# Patient Record
Sex: MALE | Race: WHITE | NOT HISPANIC OR LATINO
[De-identification: names, ages, dates, MRNs, and addresses within clinical notes are randomized per-mention and may not be internally consistent; named-entity substitution may affect disease eponyms.]

---

## 2017-05-11 ENCOUNTER — TRANSCRIPTION ENCOUNTER (OUTPATIENT)
Age: 77
End: 2017-05-11

## 2017-05-11 PROBLEM — Z00.00 ENCOUNTER FOR PREVENTIVE HEALTH EXAMINATION: Status: ACTIVE | Noted: 2017-05-11

## 2017-06-01 ENCOUNTER — APPOINTMENT (OUTPATIENT)
Dept: SURGERY | Facility: CLINIC | Age: 77
End: 2017-06-01

## 2017-06-01 VITALS
WEIGHT: 172 LBS | DIASTOLIC BLOOD PRESSURE: 72 MMHG | BODY MASS INDEX: 28.66 KG/M2 | SYSTOLIC BLOOD PRESSURE: 118 MMHG | HEIGHT: 65 IN

## 2017-06-01 DIAGNOSIS — I25.10 ATHEROSCLEROTIC HEART DISEASE OF NATIVE CORONARY ARTERY W/OUT ANGINA PECTORIS: ICD-10-CM

## 2017-06-01 DIAGNOSIS — Z86.79 PERSONAL HISTORY OF OTHER DISEASES OF THE CIRCULATORY SYSTEM: ICD-10-CM

## 2017-06-01 DIAGNOSIS — K81.1 CHRONIC CHOLECYSTITIS: ICD-10-CM

## 2017-06-09 ENCOUNTER — OUTPATIENT (OUTPATIENT)
Dept: OUTPATIENT SERVICES | Facility: HOSPITAL | Age: 77
LOS: 1 days | Discharge: HOME | End: 2017-06-09

## 2017-06-09 DIAGNOSIS — I10 ESSENTIAL (PRIMARY) HYPERTENSION: ICD-10-CM

## 2017-06-09 DIAGNOSIS — I47.2 VENTRICULAR TACHYCARDIA: ICD-10-CM

## 2017-06-09 DIAGNOSIS — I47.1 SUPRAVENTRICULAR TACHYCARDIA: ICD-10-CM

## 2017-06-09 DIAGNOSIS — I25.10 ATHEROSCLEROTIC HEART DISEASE OF NATIVE CORONARY ARTERY WITHOUT ANGINA PECTORIS: ICD-10-CM

## 2017-06-09 DIAGNOSIS — E78.00 PURE HYPERCHOLESTEROLEMIA, UNSPECIFIED: ICD-10-CM

## 2017-06-09 DIAGNOSIS — J44.9 CHRONIC OBSTRUCTIVE PULMONARY DISEASE, UNSPECIFIED: ICD-10-CM

## 2017-06-16 ENCOUNTER — OUTPATIENT (OUTPATIENT)
Dept: OUTPATIENT SERVICES | Facility: HOSPITAL | Age: 77
LOS: 1 days | Discharge: HOME | End: 2017-06-16

## 2017-06-16 DIAGNOSIS — I10 ESSENTIAL (PRIMARY) HYPERTENSION: ICD-10-CM

## 2017-06-16 DIAGNOSIS — I47.1 SUPRAVENTRICULAR TACHYCARDIA: ICD-10-CM

## 2017-06-16 DIAGNOSIS — J44.9 CHRONIC OBSTRUCTIVE PULMONARY DISEASE, UNSPECIFIED: ICD-10-CM

## 2017-06-16 DIAGNOSIS — I47.2 VENTRICULAR TACHYCARDIA: ICD-10-CM

## 2017-06-16 DIAGNOSIS — I25.10 ATHEROSCLEROTIC HEART DISEASE OF NATIVE CORONARY ARTERY WITHOUT ANGINA PECTORIS: ICD-10-CM

## 2017-06-16 DIAGNOSIS — E78.00 PURE HYPERCHOLESTEROLEMIA, UNSPECIFIED: ICD-10-CM

## 2017-06-21 ENCOUNTER — OUTPATIENT (OUTPATIENT)
Dept: OUTPATIENT SERVICES | Facility: HOSPITAL | Age: 77
LOS: 1 days | Discharge: HOME | End: 2017-06-21

## 2017-06-21 DIAGNOSIS — E78.00 PURE HYPERCHOLESTEROLEMIA, UNSPECIFIED: ICD-10-CM

## 2017-06-21 DIAGNOSIS — I25.10 ATHEROSCLEROTIC HEART DISEASE OF NATIVE CORONARY ARTERY WITHOUT ANGINA PECTORIS: ICD-10-CM

## 2017-06-21 DIAGNOSIS — I10 ESSENTIAL (PRIMARY) HYPERTENSION: ICD-10-CM

## 2017-06-21 DIAGNOSIS — I47.1 SUPRAVENTRICULAR TACHYCARDIA: ICD-10-CM

## 2017-06-21 DIAGNOSIS — I47.2 VENTRICULAR TACHYCARDIA: ICD-10-CM

## 2017-06-21 DIAGNOSIS — J44.9 CHRONIC OBSTRUCTIVE PULMONARY DISEASE, UNSPECIFIED: ICD-10-CM

## 2017-06-23 ENCOUNTER — INPATIENT (INPATIENT)
Facility: HOSPITAL | Age: 77
LOS: 2 days | Discharge: HOME | End: 2017-06-26
Attending: SURGERY

## 2017-06-23 DIAGNOSIS — I47.2 VENTRICULAR TACHYCARDIA: ICD-10-CM

## 2017-06-23 DIAGNOSIS — I10 ESSENTIAL (PRIMARY) HYPERTENSION: ICD-10-CM

## 2017-06-23 DIAGNOSIS — I25.10 ATHEROSCLEROTIC HEART DISEASE OF NATIVE CORONARY ARTERY WITHOUT ANGINA PECTORIS: ICD-10-CM

## 2017-06-23 DIAGNOSIS — I47.1 SUPRAVENTRICULAR TACHYCARDIA: ICD-10-CM

## 2017-06-23 DIAGNOSIS — J44.9 CHRONIC OBSTRUCTIVE PULMONARY DISEASE, UNSPECIFIED: ICD-10-CM

## 2017-06-23 DIAGNOSIS — E78.00 PURE HYPERCHOLESTEROLEMIA, UNSPECIFIED: ICD-10-CM

## 2017-06-27 ENCOUNTER — OUTPATIENT (OUTPATIENT)
Dept: OUTPATIENT SERVICES | Facility: HOSPITAL | Age: 77
LOS: 1 days | Discharge: HOME | End: 2017-06-27

## 2017-06-27 DIAGNOSIS — I47.2 VENTRICULAR TACHYCARDIA: ICD-10-CM

## 2017-06-27 DIAGNOSIS — J44.9 CHRONIC OBSTRUCTIVE PULMONARY DISEASE, UNSPECIFIED: ICD-10-CM

## 2017-06-27 DIAGNOSIS — I10 ESSENTIAL (PRIMARY) HYPERTENSION: ICD-10-CM

## 2017-06-27 DIAGNOSIS — I25.10 ATHEROSCLEROTIC HEART DISEASE OF NATIVE CORONARY ARTERY WITHOUT ANGINA PECTORIS: ICD-10-CM

## 2017-06-27 DIAGNOSIS — E78.00 PURE HYPERCHOLESTEROLEMIA, UNSPECIFIED: ICD-10-CM

## 2017-06-27 DIAGNOSIS — I47.1 SUPRAVENTRICULAR TACHYCARDIA: ICD-10-CM

## 2017-06-28 DIAGNOSIS — Z95.2 PRESENCE OF PROSTHETIC HEART VALVE: ICD-10-CM

## 2017-06-28 DIAGNOSIS — Z79.01 LONG TERM (CURRENT) USE OF ANTICOAGULANTS: ICD-10-CM

## 2017-06-30 DIAGNOSIS — J96.00 ACUTE RESPIRATORY FAILURE, UNSPECIFIED WHETHER WITH HYPOXIA OR HYPERCAPNIA: ICD-10-CM

## 2017-06-30 DIAGNOSIS — I10 ESSENTIAL (PRIMARY) HYPERTENSION: ICD-10-CM

## 2017-06-30 DIAGNOSIS — I48.92 UNSPECIFIED ATRIAL FLUTTER: ICD-10-CM

## 2017-06-30 DIAGNOSIS — Z79.01 LONG TERM (CURRENT) USE OF ANTICOAGULANTS: ICD-10-CM

## 2017-06-30 DIAGNOSIS — I25.2 OLD MYOCARDIAL INFARCTION: ICD-10-CM

## 2017-06-30 DIAGNOSIS — K80.00 CALCULUS OF GALLBLADDER WITH ACUTE CHOLECYSTITIS WITHOUT OBSTRUCTION: ICD-10-CM

## 2017-06-30 DIAGNOSIS — F17.200 NICOTINE DEPENDENCE, UNSPECIFIED, UNCOMPLICATED: ICD-10-CM

## 2017-06-30 DIAGNOSIS — Z95.0 PRESENCE OF CARDIAC PACEMAKER: ICD-10-CM

## 2017-06-30 DIAGNOSIS — E78.5 HYPERLIPIDEMIA, UNSPECIFIED: ICD-10-CM

## 2017-06-30 DIAGNOSIS — I25.10 ATHEROSCLEROTIC HEART DISEASE OF NATIVE CORONARY ARTERY WITHOUT ANGINA PECTORIS: ICD-10-CM

## 2017-06-30 DIAGNOSIS — K81.1 CHRONIC CHOLECYSTITIS: ICD-10-CM

## 2017-06-30 DIAGNOSIS — I44.0 ATRIOVENTRICULAR BLOCK, FIRST DEGREE: ICD-10-CM

## 2017-06-30 DIAGNOSIS — I48.91 UNSPECIFIED ATRIAL FIBRILLATION: ICD-10-CM

## 2017-06-30 DIAGNOSIS — Z95.810 PRESENCE OF AUTOMATIC (IMPLANTABLE) CARDIAC DEFIBRILLATOR: ICD-10-CM

## 2017-06-30 DIAGNOSIS — Z95.2 PRESENCE OF PROSTHETIC HEART VALVE: ICD-10-CM

## 2017-06-30 DIAGNOSIS — J44.9 CHRONIC OBSTRUCTIVE PULMONARY DISEASE, UNSPECIFIED: ICD-10-CM

## 2017-06-30 DIAGNOSIS — Z95.1 PRESENCE OF AORTOCORONARY BYPASS GRAFT: ICD-10-CM

## 2017-07-01 ENCOUNTER — OUTPATIENT (OUTPATIENT)
Dept: OUTPATIENT SERVICES | Facility: HOSPITAL | Age: 77
LOS: 1 days | Discharge: HOME | End: 2017-07-01

## 2017-07-01 DIAGNOSIS — I25.10 ATHEROSCLEROTIC HEART DISEASE OF NATIVE CORONARY ARTERY WITHOUT ANGINA PECTORIS: ICD-10-CM

## 2017-07-01 DIAGNOSIS — E78.00 PURE HYPERCHOLESTEROLEMIA, UNSPECIFIED: ICD-10-CM

## 2017-07-01 DIAGNOSIS — I47.2 VENTRICULAR TACHYCARDIA: ICD-10-CM

## 2017-07-01 DIAGNOSIS — I47.1 SUPRAVENTRICULAR TACHYCARDIA: ICD-10-CM

## 2017-07-01 DIAGNOSIS — Z79.01 LONG TERM (CURRENT) USE OF ANTICOAGULANTS: ICD-10-CM

## 2017-07-01 DIAGNOSIS — I10 ESSENTIAL (PRIMARY) HYPERTENSION: ICD-10-CM

## 2017-07-01 DIAGNOSIS — Z95.2 PRESENCE OF PROSTHETIC HEART VALVE: ICD-10-CM

## 2017-07-01 DIAGNOSIS — J44.9 CHRONIC OBSTRUCTIVE PULMONARY DISEASE, UNSPECIFIED: ICD-10-CM

## 2017-07-05 DIAGNOSIS — J95.821 ACUTE POSTPROCEDURAL RESPIRATORY FAILURE: ICD-10-CM

## 2017-07-06 ENCOUNTER — OUTPATIENT (OUTPATIENT)
Dept: OUTPATIENT SERVICES | Facility: HOSPITAL | Age: 77
LOS: 1 days | Discharge: HOME | End: 2017-07-06

## 2017-07-06 DIAGNOSIS — I10 ESSENTIAL (PRIMARY) HYPERTENSION: ICD-10-CM

## 2017-07-06 DIAGNOSIS — I47.2 VENTRICULAR TACHYCARDIA: ICD-10-CM

## 2017-07-06 DIAGNOSIS — I47.1 SUPRAVENTRICULAR TACHYCARDIA: ICD-10-CM

## 2017-07-06 DIAGNOSIS — Z79.01 LONG TERM (CURRENT) USE OF ANTICOAGULANTS: ICD-10-CM

## 2017-07-06 DIAGNOSIS — J44.9 CHRONIC OBSTRUCTIVE PULMONARY DISEASE, UNSPECIFIED: ICD-10-CM

## 2017-07-06 DIAGNOSIS — Z95.2 PRESENCE OF PROSTHETIC HEART VALVE: ICD-10-CM

## 2017-07-06 DIAGNOSIS — E78.00 PURE HYPERCHOLESTEROLEMIA, UNSPECIFIED: ICD-10-CM

## 2017-07-06 DIAGNOSIS — I25.10 ATHEROSCLEROTIC HEART DISEASE OF NATIVE CORONARY ARTERY WITHOUT ANGINA PECTORIS: ICD-10-CM

## 2017-07-12 ENCOUNTER — APPOINTMENT (OUTPATIENT)
Dept: SURGERY | Facility: CLINIC | Age: 77
End: 2017-07-12

## 2017-07-13 ENCOUNTER — OUTPATIENT (OUTPATIENT)
Dept: OUTPATIENT SERVICES | Facility: HOSPITAL | Age: 77
LOS: 1 days | Discharge: HOME | End: 2017-07-13

## 2017-07-13 DIAGNOSIS — E78.00 PURE HYPERCHOLESTEROLEMIA, UNSPECIFIED: ICD-10-CM

## 2017-07-13 DIAGNOSIS — J44.9 CHRONIC OBSTRUCTIVE PULMONARY DISEASE, UNSPECIFIED: ICD-10-CM

## 2017-07-13 DIAGNOSIS — Z79.01 LONG TERM (CURRENT) USE OF ANTICOAGULANTS: ICD-10-CM

## 2017-07-13 DIAGNOSIS — I10 ESSENTIAL (PRIMARY) HYPERTENSION: ICD-10-CM

## 2017-07-13 DIAGNOSIS — I47.1 SUPRAVENTRICULAR TACHYCARDIA: ICD-10-CM

## 2017-07-13 DIAGNOSIS — I47.2 VENTRICULAR TACHYCARDIA: ICD-10-CM

## 2017-07-13 DIAGNOSIS — I25.10 ATHEROSCLEROTIC HEART DISEASE OF NATIVE CORONARY ARTERY WITHOUT ANGINA PECTORIS: ICD-10-CM

## 2017-07-13 DIAGNOSIS — Z95.2 PRESENCE OF PROSTHETIC HEART VALVE: ICD-10-CM

## 2017-07-27 ENCOUNTER — OUTPATIENT (OUTPATIENT)
Dept: OUTPATIENT SERVICES | Facility: HOSPITAL | Age: 77
LOS: 1 days | Discharge: HOME | End: 2017-07-27

## 2017-07-27 DIAGNOSIS — Z79.01 LONG TERM (CURRENT) USE OF ANTICOAGULANTS: ICD-10-CM

## 2017-07-27 DIAGNOSIS — I10 ESSENTIAL (PRIMARY) HYPERTENSION: ICD-10-CM

## 2017-07-27 DIAGNOSIS — I47.1 SUPRAVENTRICULAR TACHYCARDIA: ICD-10-CM

## 2017-07-27 DIAGNOSIS — I47.2 VENTRICULAR TACHYCARDIA: ICD-10-CM

## 2017-07-27 DIAGNOSIS — E78.00 PURE HYPERCHOLESTEROLEMIA, UNSPECIFIED: ICD-10-CM

## 2017-07-27 DIAGNOSIS — J44.9 CHRONIC OBSTRUCTIVE PULMONARY DISEASE, UNSPECIFIED: ICD-10-CM

## 2017-07-27 DIAGNOSIS — Z95.2 PRESENCE OF PROSTHETIC HEART VALVE: ICD-10-CM

## 2017-07-27 DIAGNOSIS — I25.10 ATHEROSCLEROTIC HEART DISEASE OF NATIVE CORONARY ARTERY WITHOUT ANGINA PECTORIS: ICD-10-CM

## 2017-08-17 ENCOUNTER — OUTPATIENT (OUTPATIENT)
Dept: OUTPATIENT SERVICES | Facility: HOSPITAL | Age: 77
LOS: 1 days | Discharge: HOME | End: 2017-08-17

## 2017-08-17 DIAGNOSIS — I47.2 VENTRICULAR TACHYCARDIA: ICD-10-CM

## 2017-08-17 DIAGNOSIS — Z79.01 LONG TERM (CURRENT) USE OF ANTICOAGULANTS: ICD-10-CM

## 2017-08-17 DIAGNOSIS — I10 ESSENTIAL (PRIMARY) HYPERTENSION: ICD-10-CM

## 2017-08-17 DIAGNOSIS — Z95.2 PRESENCE OF PROSTHETIC HEART VALVE: ICD-10-CM

## 2017-08-17 DIAGNOSIS — I47.1 SUPRAVENTRICULAR TACHYCARDIA: ICD-10-CM

## 2017-08-17 DIAGNOSIS — E78.00 PURE HYPERCHOLESTEROLEMIA, UNSPECIFIED: ICD-10-CM

## 2017-08-17 DIAGNOSIS — I25.10 ATHEROSCLEROTIC HEART DISEASE OF NATIVE CORONARY ARTERY WITHOUT ANGINA PECTORIS: ICD-10-CM

## 2017-08-17 DIAGNOSIS — J44.9 CHRONIC OBSTRUCTIVE PULMONARY DISEASE, UNSPECIFIED: ICD-10-CM

## 2017-09-14 ENCOUNTER — OUTPATIENT (OUTPATIENT)
Dept: OUTPATIENT SERVICES | Facility: HOSPITAL | Age: 77
LOS: 1 days | Discharge: HOME | End: 2017-09-14

## 2017-09-14 DIAGNOSIS — I47.1 SUPRAVENTRICULAR TACHYCARDIA: ICD-10-CM

## 2017-09-14 DIAGNOSIS — Z79.01 LONG TERM (CURRENT) USE OF ANTICOAGULANTS: ICD-10-CM

## 2017-09-14 DIAGNOSIS — J44.9 CHRONIC OBSTRUCTIVE PULMONARY DISEASE, UNSPECIFIED: ICD-10-CM

## 2017-09-14 DIAGNOSIS — E78.00 PURE HYPERCHOLESTEROLEMIA, UNSPECIFIED: ICD-10-CM

## 2017-09-14 DIAGNOSIS — I25.10 ATHEROSCLEROTIC HEART DISEASE OF NATIVE CORONARY ARTERY WITHOUT ANGINA PECTORIS: ICD-10-CM

## 2017-09-14 DIAGNOSIS — I10 ESSENTIAL (PRIMARY) HYPERTENSION: ICD-10-CM

## 2017-09-14 DIAGNOSIS — I47.2 VENTRICULAR TACHYCARDIA: ICD-10-CM

## 2017-09-14 DIAGNOSIS — Z95.2 PRESENCE OF PROSTHETIC HEART VALVE: ICD-10-CM

## 2017-10-12 ENCOUNTER — OUTPATIENT (OUTPATIENT)
Dept: OUTPATIENT SERVICES | Facility: HOSPITAL | Age: 77
LOS: 1 days | Discharge: HOME | End: 2017-10-12

## 2017-10-12 DIAGNOSIS — I10 ESSENTIAL (PRIMARY) HYPERTENSION: ICD-10-CM

## 2017-10-12 DIAGNOSIS — E78.00 PURE HYPERCHOLESTEROLEMIA, UNSPECIFIED: ICD-10-CM

## 2017-10-12 DIAGNOSIS — J44.9 CHRONIC OBSTRUCTIVE PULMONARY DISEASE, UNSPECIFIED: ICD-10-CM

## 2017-10-12 DIAGNOSIS — Z95.2 PRESENCE OF PROSTHETIC HEART VALVE: ICD-10-CM

## 2017-10-12 DIAGNOSIS — I47.1 SUPRAVENTRICULAR TACHYCARDIA: ICD-10-CM

## 2017-10-12 DIAGNOSIS — I47.2 VENTRICULAR TACHYCARDIA: ICD-10-CM

## 2017-10-12 DIAGNOSIS — Z79.01 LONG TERM (CURRENT) USE OF ANTICOAGULANTS: ICD-10-CM

## 2017-10-12 DIAGNOSIS — I25.10 ATHEROSCLEROTIC HEART DISEASE OF NATIVE CORONARY ARTERY WITHOUT ANGINA PECTORIS: ICD-10-CM

## 2017-11-07 ENCOUNTER — OUTPATIENT (OUTPATIENT)
Dept: OUTPATIENT SERVICES | Facility: HOSPITAL | Age: 77
LOS: 1 days | Discharge: HOME | End: 2017-11-07

## 2017-11-07 DIAGNOSIS — Z79.899 OTHER LONG TERM (CURRENT) DRUG THERAPY: ICD-10-CM

## 2017-11-07 DIAGNOSIS — I25.10 ATHEROSCLEROTIC HEART DISEASE OF NATIVE CORONARY ARTERY WITHOUT ANGINA PECTORIS: ICD-10-CM

## 2017-11-07 DIAGNOSIS — E78.00 PURE HYPERCHOLESTEROLEMIA, UNSPECIFIED: ICD-10-CM

## 2017-11-07 DIAGNOSIS — Z01.810 ENCOUNTER FOR PREPROCEDURAL CARDIOVASCULAR EXAMINATION: ICD-10-CM

## 2017-11-07 DIAGNOSIS — J44.9 CHRONIC OBSTRUCTIVE PULMONARY DISEASE, UNSPECIFIED: ICD-10-CM

## 2017-11-07 DIAGNOSIS — I47.1 SUPRAVENTRICULAR TACHYCARDIA: ICD-10-CM

## 2017-11-07 DIAGNOSIS — I47.2 VENTRICULAR TACHYCARDIA: ICD-10-CM

## 2017-11-07 DIAGNOSIS — I10 ESSENTIAL (PRIMARY) HYPERTENSION: ICD-10-CM

## 2017-11-08 DIAGNOSIS — K81.1 CHRONIC CHOLECYSTITIS: ICD-10-CM

## 2017-11-08 DIAGNOSIS — J30.9 ALLERGIC RHINITIS, UNSPECIFIED: ICD-10-CM

## 2017-11-08 DIAGNOSIS — I10 ESSENTIAL (PRIMARY) HYPERTENSION: ICD-10-CM

## 2017-11-08 DIAGNOSIS — J44.9 CHRONIC OBSTRUCTIVE PULMONARY DISEASE, UNSPECIFIED: ICD-10-CM

## 2017-11-08 DIAGNOSIS — I50.9 HEART FAILURE, UNSPECIFIED: ICD-10-CM

## 2017-11-08 DIAGNOSIS — I25.10 ATHEROSCLEROTIC HEART DISEASE OF NATIVE CORONARY ARTERY WITHOUT ANGINA PECTORIS: ICD-10-CM

## 2017-11-08 DIAGNOSIS — Z01.818 ENCOUNTER FOR OTHER PREPROCEDURAL EXAMINATION: ICD-10-CM

## 2017-11-08 DIAGNOSIS — Z87.891 PERSONAL HISTORY OF NICOTINE DEPENDENCE: ICD-10-CM

## 2017-11-08 DIAGNOSIS — K82.9 DISEASE OF GALLBLADDER, UNSPECIFIED: ICD-10-CM

## 2017-11-08 DIAGNOSIS — I34.8 OTHER NONRHEUMATIC MITRAL VALVE DISORDERS: ICD-10-CM

## 2017-11-14 ENCOUNTER — OUTPATIENT (OUTPATIENT)
Dept: OUTPATIENT SERVICES | Facility: HOSPITAL | Age: 77
LOS: 1 days | Discharge: HOME | End: 2017-11-14

## 2017-11-14 DIAGNOSIS — Z95.2 PRESENCE OF PROSTHETIC HEART VALVE: ICD-10-CM

## 2017-11-14 DIAGNOSIS — J44.9 CHRONIC OBSTRUCTIVE PULMONARY DISEASE, UNSPECIFIED: ICD-10-CM

## 2017-11-14 DIAGNOSIS — Z79.01 LONG TERM (CURRENT) USE OF ANTICOAGULANTS: ICD-10-CM

## 2017-11-14 DIAGNOSIS — I10 ESSENTIAL (PRIMARY) HYPERTENSION: ICD-10-CM

## 2017-11-14 DIAGNOSIS — E78.00 PURE HYPERCHOLESTEROLEMIA, UNSPECIFIED: ICD-10-CM

## 2017-11-14 DIAGNOSIS — I25.10 ATHEROSCLEROTIC HEART DISEASE OF NATIVE CORONARY ARTERY WITHOUT ANGINA PECTORIS: ICD-10-CM

## 2017-11-14 DIAGNOSIS — I47.1 SUPRAVENTRICULAR TACHYCARDIA: ICD-10-CM

## 2017-11-14 DIAGNOSIS — I47.2 VENTRICULAR TACHYCARDIA: ICD-10-CM

## 2017-11-28 ENCOUNTER — OUTPATIENT (OUTPATIENT)
Dept: OUTPATIENT SERVICES | Facility: HOSPITAL | Age: 77
LOS: 1 days | Discharge: HOME | End: 2017-11-28

## 2017-11-28 DIAGNOSIS — Z79.01 LONG TERM (CURRENT) USE OF ANTICOAGULANTS: ICD-10-CM

## 2017-11-28 DIAGNOSIS — I47.2 VENTRICULAR TACHYCARDIA: ICD-10-CM

## 2017-11-28 DIAGNOSIS — I10 ESSENTIAL (PRIMARY) HYPERTENSION: ICD-10-CM

## 2017-11-28 DIAGNOSIS — I47.1 SUPRAVENTRICULAR TACHYCARDIA: ICD-10-CM

## 2017-11-28 DIAGNOSIS — E78.00 PURE HYPERCHOLESTEROLEMIA, UNSPECIFIED: ICD-10-CM

## 2017-11-28 DIAGNOSIS — J44.9 CHRONIC OBSTRUCTIVE PULMONARY DISEASE, UNSPECIFIED: ICD-10-CM

## 2017-11-28 DIAGNOSIS — Z95.2 PRESENCE OF PROSTHETIC HEART VALVE: ICD-10-CM

## 2017-11-28 DIAGNOSIS — I25.10 ATHEROSCLEROTIC HEART DISEASE OF NATIVE CORONARY ARTERY WITHOUT ANGINA PECTORIS: ICD-10-CM

## 2017-12-27 ENCOUNTER — OUTPATIENT (OUTPATIENT)
Dept: OUTPATIENT SERVICES | Facility: HOSPITAL | Age: 77
LOS: 1 days | Discharge: HOME | End: 2017-12-27

## 2017-12-27 DIAGNOSIS — I47.1 SUPRAVENTRICULAR TACHYCARDIA: ICD-10-CM

## 2017-12-27 DIAGNOSIS — Z79.01 LONG TERM (CURRENT) USE OF ANTICOAGULANTS: ICD-10-CM

## 2017-12-27 DIAGNOSIS — J44.9 CHRONIC OBSTRUCTIVE PULMONARY DISEASE, UNSPECIFIED: ICD-10-CM

## 2017-12-27 DIAGNOSIS — E78.00 PURE HYPERCHOLESTEROLEMIA, UNSPECIFIED: ICD-10-CM

## 2017-12-27 DIAGNOSIS — I47.2 VENTRICULAR TACHYCARDIA: ICD-10-CM

## 2017-12-27 DIAGNOSIS — Z95.2 PRESENCE OF PROSTHETIC HEART VALVE: ICD-10-CM

## 2017-12-27 DIAGNOSIS — I10 ESSENTIAL (PRIMARY) HYPERTENSION: ICD-10-CM

## 2017-12-27 DIAGNOSIS — I25.10 ATHEROSCLEROTIC HEART DISEASE OF NATIVE CORONARY ARTERY WITHOUT ANGINA PECTORIS: ICD-10-CM

## 2018-01-22 ENCOUNTER — OUTPATIENT (OUTPATIENT)
Dept: OUTPATIENT SERVICES | Facility: HOSPITAL | Age: 78
LOS: 1 days | Discharge: HOME | End: 2018-01-22

## 2018-01-22 DIAGNOSIS — I25.10 ATHEROSCLEROTIC HEART DISEASE OF NATIVE CORONARY ARTERY WITHOUT ANGINA PECTORIS: ICD-10-CM

## 2018-01-22 DIAGNOSIS — Z01.810 ENCOUNTER FOR PREPROCEDURAL CARDIOVASCULAR EXAMINATION: ICD-10-CM

## 2018-01-22 DIAGNOSIS — E78.00 PURE HYPERCHOLESTEROLEMIA, UNSPECIFIED: ICD-10-CM

## 2018-01-22 DIAGNOSIS — Z79.899 OTHER LONG TERM (CURRENT) DRUG THERAPY: ICD-10-CM

## 2018-01-24 ENCOUNTER — OUTPATIENT (OUTPATIENT)
Dept: OUTPATIENT SERVICES | Facility: HOSPITAL | Age: 78
LOS: 1 days | Discharge: HOME | End: 2018-01-24

## 2018-01-24 DIAGNOSIS — Z95.2 PRESENCE OF PROSTHETIC HEART VALVE: ICD-10-CM

## 2018-01-24 DIAGNOSIS — Z79.01 LONG TERM (CURRENT) USE OF ANTICOAGULANTS: ICD-10-CM

## 2018-02-04 DIAGNOSIS — I10 ESSENTIAL (PRIMARY) HYPERTENSION: ICD-10-CM

## 2018-02-04 DIAGNOSIS — I25.10 ATHEROSCLEROTIC HEART DISEASE OF NATIVE CORONARY ARTERY WITHOUT ANGINA PECTORIS: ICD-10-CM

## 2018-02-04 DIAGNOSIS — E78.00 PURE HYPERCHOLESTEROLEMIA, UNSPECIFIED: ICD-10-CM

## 2018-02-04 DIAGNOSIS — I47.1 SUPRAVENTRICULAR TACHYCARDIA: ICD-10-CM

## 2018-02-04 DIAGNOSIS — J44.9 CHRONIC OBSTRUCTIVE PULMONARY DISEASE, UNSPECIFIED: ICD-10-CM

## 2018-02-04 DIAGNOSIS — I47.2 VENTRICULAR TACHYCARDIA: ICD-10-CM

## 2018-02-19 ENCOUNTER — INPATIENT (INPATIENT)
Facility: HOSPITAL | Age: 78
LOS: 0 days | Discharge: LEFT BEFORE DISCHARGE | End: 2018-02-19
Attending: INTERNAL MEDICINE

## 2018-02-19 ENCOUNTER — TRANSCRIPTION ENCOUNTER (OUTPATIENT)
Age: 78
End: 2018-02-19

## 2018-02-19 VITALS
RESPIRATION RATE: 18 BRPM | TEMPERATURE: 98 F | SYSTOLIC BLOOD PRESSURE: 152 MMHG | OXYGEN SATURATION: 98 % | HEART RATE: 77 BPM | DIASTOLIC BLOOD PRESSURE: 78 MMHG

## 2018-02-19 VITALS
SYSTOLIC BLOOD PRESSURE: 123 MMHG | TEMPERATURE: 98 F | RESPIRATION RATE: 20 BRPM | HEART RATE: 86 BPM | OXYGEN SATURATION: 98 % | DIASTOLIC BLOOD PRESSURE: 76 MMHG

## 2018-02-19 DIAGNOSIS — R20.0 ANESTHESIA OF SKIN: ICD-10-CM

## 2018-02-19 LAB
ALBUMIN SERPL ELPH-MCNC: 4.2 G/DL — SIGNIFICANT CHANGE UP (ref 3–5.5)
ALP SERPL-CCNC: 61 U/L — SIGNIFICANT CHANGE UP (ref 30–115)
ALT FLD-CCNC: 17 U/L — SIGNIFICANT CHANGE UP (ref 0–41)
ANION GAP SERPL CALC-SCNC: 8 MMOL/L — SIGNIFICANT CHANGE UP (ref 7–14)
APTT BLD: 47.8 SEC — HIGH (ref 27–39.2)
AST SERPL-CCNC: 45 U/L — HIGH (ref 0–41)
BASOPHILS # BLD AUTO: 0.03 K/UL — SIGNIFICANT CHANGE UP (ref 0–0.2)
BASOPHILS NFR BLD AUTO: 0.4 % — SIGNIFICANT CHANGE UP (ref 0–1)
BILIRUB SERPL-MCNC: 1.7 MG/DL — HIGH (ref 0.2–1.2)
BUN SERPL-MCNC: 25 MG/DL — HIGH (ref 10–20)
CALCIUM SERPL-MCNC: 9.1 MG/DL — SIGNIFICANT CHANGE UP (ref 8.5–10.1)
CHLORIDE SERPL-SCNC: 99 MMOL/L — SIGNIFICANT CHANGE UP (ref 98–110)
CO2 SERPL-SCNC: 30 MMOL/L — SIGNIFICANT CHANGE UP (ref 17–32)
CREAT SERPL-MCNC: 1.3 MG/DL — SIGNIFICANT CHANGE UP (ref 0.7–1.5)
EOSINOPHIL # BLD AUTO: 0.07 K/UL — SIGNIFICANT CHANGE UP (ref 0–0.7)
EOSINOPHIL NFR BLD AUTO: 0.8 % — SIGNIFICANT CHANGE UP (ref 0–8)
GLUCOSE SERPL-MCNC: 96 MG/DL — SIGNIFICANT CHANGE UP (ref 70–110)
HCT VFR BLD CALC: 48.1 % — SIGNIFICANT CHANGE UP (ref 42–52)
HGB BLD-MCNC: 16.1 G/DL — SIGNIFICANT CHANGE UP (ref 14–18)
IMM GRANULOCYTES NFR BLD AUTO: 0.6 % — HIGH (ref 0.1–0.3)
INR BLD: 3.72 RATIO — HIGH (ref 0.65–1.3)
LYMPHOCYTES # BLD AUTO: 1.58 K/UL — SIGNIFICANT CHANGE UP (ref 1.2–3.4)
LYMPHOCYTES # BLD AUTO: 18.5 % — LOW (ref 20.5–51.1)
MCHC RBC-ENTMCNC: 29 PG — SIGNIFICANT CHANGE UP (ref 27–31)
MCHC RBC-ENTMCNC: 33.5 G/DL — SIGNIFICANT CHANGE UP (ref 32–37)
MCV RBC AUTO: 86.7 FL — SIGNIFICANT CHANGE UP (ref 80–94)
MONOCYTES # BLD AUTO: 0.78 K/UL — HIGH (ref 0.1–0.6)
MONOCYTES NFR BLD AUTO: 9.1 % — SIGNIFICANT CHANGE UP (ref 1.7–9.3)
NEUTROPHILS # BLD AUTO: 6.03 K/UL — SIGNIFICANT CHANGE UP (ref 1.4–6.5)
NEUTROPHILS NFR BLD AUTO: 70.6 % — SIGNIFICANT CHANGE UP (ref 42.2–75.2)
NRBC # BLD: 0 /100 WBCS — SIGNIFICANT CHANGE UP (ref 0–0)
PLATELET # BLD AUTO: 237 K/UL — SIGNIFICANT CHANGE UP (ref 130–400)
POTASSIUM SERPL-MCNC: 5.7 MMOL/L — HIGH (ref 3.5–5)
POTASSIUM SERPL-SCNC: 5.7 MMOL/L — HIGH (ref 3.5–5)
PROT SERPL-MCNC: 7.3 G/DL — SIGNIFICANT CHANGE UP (ref 6–8)
PROTHROM AB SERPL-ACNC: >40 SEC — HIGH (ref 9.95–12.87)
RBC # BLD: 5.55 M/UL — SIGNIFICANT CHANGE UP (ref 4.7–6.1)
RBC # FLD: SIGNIFICANT CHANGE UP % (ref 11.5–14.5)
SODIUM SERPL-SCNC: 137 MMOL/L — SIGNIFICANT CHANGE UP (ref 135–146)
TROPONIN I SERPL-MCNC: <0.02 NG/ML — SIGNIFICANT CHANGE UP (ref 0–0.05)
WBC # BLD: 8.54 K/UL — SIGNIFICANT CHANGE UP (ref 4.8–10.8)
WBC # FLD AUTO: 8.54 K/UL — SIGNIFICANT CHANGE UP (ref 4.8–10.8)

## 2018-02-19 NOTE — DISCHARGE NOTE ADULT - CARE PLAN
Principal Discharge DX:	Left arm numbness  Goal:	left arm numbness  Assessment and plan of treatment:	f/u with neurology dr russo  T# [phone]  [address] [place] [address]

## 2018-02-19 NOTE — ED ADULT NURSE REASSESSMENT NOTE - NS ED NURSE REASSESS COMMENT FT1
Pt left AMA. D/C papers given to pt with f/u instructions understood. VS stable. Son at bedside taking pt home. No acute distress noted.

## 2018-02-19 NOTE — STROKE CODE NOTE - NIH STROKE SCALE: 8. SENSORY, QM
(1) Mild-to-moderate sensory loss; patient feels pinprick is less sharp or is dull on the affected side; or there is a loss of superficial pain with pinprick, but patient is aware of being touched
(0) Normal; no sensory loss

## 2018-02-19 NOTE — DISCHARGE NOTE ADULT - PATIENT PORTAL LINK FT
You can access the [facility] Patient Portal, offered by [facility], by registering with the following website: [email or web address]

## 2018-02-19 NOTE — DISCHARGE NOTE ADULT - PLAN OF CARE
left arm numbness f/u with neurology dr russo  T# [phone]  [facility] [place] [address]

## 2018-02-19 NOTE — PROGRESS NOTE ADULT - SUBJECTIVE AND OBJECTIVE BOX
patient was seen and examined. patients wants to go home AMA. risk, benefit and alternatives explained to the patient. spoke with dr boyd, who agree that if patient wants to leave the hospital, will be against medical advise

## 2018-02-19 NOTE — DISCHARGE NOTE ADULT - CARE PROVIDER_API CALL
Bairon Cisneros), Neurology  [facility] [place]  [address] [place] [address]  Phone: [phone]  Fax: [phone]

## 2018-02-19 NOTE — ED ADULT NURSE NOTE - OBJECTIVE STATEMENT
Pt complains of left sided numbness that began this am. Pt face has left sided drop. Pt speech clear gait strong and steady and patient alert and oriented

## 2018-02-19 NOTE — ED PROVIDER NOTE - PHYSICAL EXAMINATION
CONSTITUTIONAL: Well-developed; well-nourished; in no acute distress.   SKIN: warm, dry  HEAD: Normocephalic; atraumatic.  EYES: PERRL, EOMI, normal sclera and conjunctiva   ENT: No nasal discharge; airway clear.  NECK: Supple; non tender.  CARD: S1, S2 normal; no murmurs, gallops, or rubs. Regular rate and rhythm.   RESP: No wheezes, rales or rhonchi.  ABD: soft ntnd  EXT: Normal ROM.  No clubbing, cyanosis or edema.   LYMPH: No acute cervical adenopathy.  NEURO: Alert, oriented. L sided smile droop with eyebrow raise spared. Strength intact and equal in all 4 extremities. Moving all extremities well. Gait normal, no foot drop, limp, weakness.   PSYCH: Cooperative, appropriate.

## 2018-02-19 NOTE — ED PROVIDER NOTE - OBJECTIVE STATEMENT
77 y m pmh pacemaker, s/p 2 cardiac valve replacements on coumadin pw L arm numbness. Began 2 days ago. Associated with L facial droop, unknown if it is new onset and lip tingling. No hx cva/prior stroke. Denies cp, sob, headache, lh, dizziness, gait abnormalities.

## 2018-02-19 NOTE — ED PROVIDER NOTE - CARE PLAN
Principal Discharge DX:	Left arm numbness  Secondary Diagnosis:	Atrial fibrillation, unspecified type

## 2018-02-19 NOTE — DISCHARGE NOTE ADULT - MEDICATION SUMMARY - MEDICATIONS TO TAKE
I will START or STAY ON the medications listed below when I get home from the hospital:    amiodarone  -- 400 milligram(s) by mouth once a day  -- Indication: For heart failure    Coumadin 7.5 mg oral tablet  -- 1 tab(s) by mouth once a day  -- Indication: For MVR    Lasix 40 mg oral tablet  -- 1 tab(s) by mouth once a day  -- Indication: For heart failure

## 2018-02-19 NOTE — ED PROVIDER NOTE - ATTENDING CONTRIBUTION TO CARE
I have reviewed triage notes and vital signs available at this time.  I have reviewed lab results, if any, available at this time.  I have reviewed EKGs, if any, available at this time.     I have reviewed radiology results and radiographs/scans, if any, available at this time.      I have personally seen, evaluated and participated in this patient's care and find this patient's history and physical examination are consistent with the chart documentation, unless noted below.  ***  Patient here with stroke like symptoms. Stroke team activated. CT head shows no signs of acute ischemic cerebrovascular accident or hemorrhage. Patient is not a tPA candidate due to low NIHSS, improving symptoms and possible cause being non-stroke related. Discussed with neurology attending.  EKG - no signs of acute ischemia/infarction.  Labs reviewed.    patient admitted for further evaluation and treatment

## 2018-02-19 NOTE — STROKE CODE NOTE - NIH STROKE SCALE: TOTAL
Subjective Finding:    Chief compalint: Patient presents with:  Back Pain  Neck Pain  , Pain Scale: 3/10, Intensity: sharp, Duration: 3 days, Radiating: no.    Date of injury:     Activities that the pain restricts:   Home/household/hobbies/social activities: yes.  Work duties: yes.  Sleep: yes.  Makes symptoms better: rest and core strength  Makes symptoms worse: activity.  Have you seen anyone else for the symptoms? .  Work related: no.  Automobile related injury: no.    Objective and Assessment:    Posture Analysis:   High shoulder: .  Head tilt: .  High iliac crest: .  Head carriage: neutral.  Thoracic Kyphosis: neutral.  Lumbar Lordosis: forward.    Lumbar Range of Motion: flexion decreased, extension decreased, left lateral flexion decreased and right lateral flexion decreased.  Cervical Range of Motion: extension decreased.  Thoracic Range of Motion: left lateral flexion decreased.  Extremity Range of Motion: .    Palpation:   Quad lumb: bilateral, referred pain: no  T paraspinals: dull pain and sharp pain, no    Segmental dysfunction pre-treatment and treatment area: C5, T6, T7, T11, L4, L5, PSIS Left and PSIS Right.    Assessment post-treatment:  Cervical: ROM increased.  Thoracic: ROM increased.  Lumbar: ROM inc.    Comments: Been through core ex classes and helps.  Drop piece adj in lumbar spine.      Complicating Factors: pain present for longer than 7 days.    Plan / Procedure:    Treatment plan: PRN.  Instructed patient: ice 20 minutes every other hour as needed, stretch as instructed at visit and walk 10 minutes.  Short term goals: reduce pain and increase ROM.  Long term goals: increase strength.  Prognosis: very good.             
1
1

## 2018-02-19 NOTE — STROKE CODE NOTE - NIH STROKE SCALE: 4. FACIAL PALSY, QM
(0) Normal symmetrical movements
(1) Minor paralysis (flattened nasolabial fold, asymmetry on smiling)

## 2018-02-19 NOTE — ED ADULT TRIAGE NOTE - CHIEF COMPLAINT QUOTE
Patient c/o left arm numbness x 2 hours PTA. Patient also has left sides facial droop. Stroke code called

## 2018-02-21 ENCOUNTER — OUTPATIENT (OUTPATIENT)
Dept: OUTPATIENT SERVICES | Facility: HOSPITAL | Age: 78
LOS: 1 days | Discharge: HOME | End: 2018-02-21

## 2018-02-21 DIAGNOSIS — Z95.2 PRESENCE OF PROSTHETIC HEART VALVE: ICD-10-CM

## 2018-02-21 DIAGNOSIS — Z79.01 LONG TERM (CURRENT) USE OF ANTICOAGULANTS: ICD-10-CM

## 2018-02-22 DIAGNOSIS — R20.0 ANESTHESIA OF SKIN: ICD-10-CM

## 2018-02-22 DIAGNOSIS — Z95.2 PRESENCE OF PROSTHETIC HEART VALVE: ICD-10-CM

## 2018-02-22 DIAGNOSIS — R29.810 FACIAL WEAKNESS: ICD-10-CM

## 2018-02-22 DIAGNOSIS — I48.91 UNSPECIFIED ATRIAL FIBRILLATION: ICD-10-CM

## 2018-02-22 DIAGNOSIS — Z79.01 LONG TERM (CURRENT) USE OF ANTICOAGULANTS: ICD-10-CM

## 2018-02-22 DIAGNOSIS — Z95.0 PRESENCE OF CARDIAC PACEMAKER: ICD-10-CM

## 2018-03-20 ENCOUNTER — OUTPATIENT (OUTPATIENT)
Dept: OUTPATIENT SERVICES | Facility: HOSPITAL | Age: 78
LOS: 1 days | Discharge: HOME | End: 2018-03-20

## 2018-03-20 DIAGNOSIS — Z79.01 LONG TERM (CURRENT) USE OF ANTICOAGULANTS: ICD-10-CM

## 2018-03-20 DIAGNOSIS — Z95.2 PRESENCE OF PROSTHETIC HEART VALVE: ICD-10-CM

## 2018-04-03 ENCOUNTER — APPOINTMENT (OUTPATIENT)
Dept: PLASTIC SURGERY | Facility: CLINIC | Age: 78
End: 2018-04-03
Payer: COMMERCIAL

## 2018-04-03 VITALS — HEIGHT: 65 IN | BODY MASS INDEX: 26.66 KG/M2 | WEIGHT: 160 LBS

## 2018-04-03 PROCEDURE — 99203 OFFICE O/P NEW LOW 30 MIN: CPT

## 2018-04-04 ENCOUNTER — OUTPATIENT (OUTPATIENT)
Dept: OUTPATIENT SERVICES | Facility: HOSPITAL | Age: 78
LOS: 1 days | Discharge: HOME | End: 2018-04-04

## 2018-04-04 DIAGNOSIS — C44.92 SQUAMOUS CELL CARCINOMA OF SKIN, UNSPECIFIED: ICD-10-CM

## 2018-04-17 ENCOUNTER — OUTPATIENT (OUTPATIENT)
Dept: OUTPATIENT SERVICES | Facility: HOSPITAL | Age: 78
LOS: 1 days | Discharge: HOME | End: 2018-04-17

## 2018-04-17 DIAGNOSIS — Z95.2 PRESENCE OF PROSTHETIC HEART VALVE: ICD-10-CM

## 2018-04-17 DIAGNOSIS — Z79.01 LONG TERM (CURRENT) USE OF ANTICOAGULANTS: ICD-10-CM

## 2018-04-19 ENCOUNTER — OUTPATIENT (OUTPATIENT)
Dept: OUTPATIENT SERVICES | Facility: HOSPITAL | Age: 78
LOS: 1 days | Discharge: HOME | End: 2018-04-19

## 2018-04-19 DIAGNOSIS — I25.10 ATHEROSCLEROTIC HEART DISEASE OF NATIVE CORONARY ARTERY WITHOUT ANGINA PECTORIS: ICD-10-CM

## 2018-04-30 ENCOUNTER — OUTPATIENT (OUTPATIENT)
Dept: OUTPATIENT SERVICES | Facility: HOSPITAL | Age: 78
LOS: 1 days | Discharge: HOME | End: 2018-04-30

## 2018-04-30 VITALS
TEMPERATURE: 97 F | HEIGHT: 65 IN | DIASTOLIC BLOOD PRESSURE: 68 MMHG | WEIGHT: 160.06 LBS | OXYGEN SATURATION: 97 % | SYSTOLIC BLOOD PRESSURE: 121 MMHG | RESPIRATION RATE: 18 BRPM | HEART RATE: 60 BPM

## 2018-04-30 DIAGNOSIS — K40.90 UNILATERAL INGUINAL HERNIA, WITHOUT OBSTRUCTION OR GANGRENE, NOT SPECIFIED AS RECURRENT: Chronic | ICD-10-CM

## 2018-04-30 DIAGNOSIS — C44.319 BASAL CELL CARCINOMA OF SKIN OF OTHER PARTS OF FACE: ICD-10-CM

## 2018-04-30 DIAGNOSIS — I48.91 UNSPECIFIED ATRIAL FIBRILLATION: ICD-10-CM

## 2018-04-30 DIAGNOSIS — Z98.890 OTHER SPECIFIED POSTPROCEDURAL STATES: Chronic | ICD-10-CM

## 2018-04-30 DIAGNOSIS — Z01.818 ENCOUNTER FOR OTHER PREPROCEDURAL EXAMINATION: ICD-10-CM

## 2018-04-30 DIAGNOSIS — C44.629 SQUAMOUS CELL CARCINOMA OF SKIN OF LEFT UPPER LIMB, INCLUDING SHOULDER: ICD-10-CM

## 2018-04-30 DIAGNOSIS — J44.9 CHRONIC OBSTRUCTIVE PULMONARY DISEASE, UNSPECIFIED: ICD-10-CM

## 2018-04-30 DIAGNOSIS — E78.00 PURE HYPERCHOLESTEROLEMIA, UNSPECIFIED: ICD-10-CM

## 2018-04-30 DIAGNOSIS — C80.1 MALIGNANT (PRIMARY) NEOPLASM, UNSPECIFIED: ICD-10-CM

## 2018-04-30 DIAGNOSIS — I50.9 HEART FAILURE, UNSPECIFIED: ICD-10-CM

## 2018-04-30 LAB
ALBUMIN SERPL ELPH-MCNC: 4.6 G/DL — SIGNIFICANT CHANGE UP (ref 3.5–5.2)
ALP SERPL-CCNC: 64 U/L — SIGNIFICANT CHANGE UP (ref 30–115)
ALT FLD-CCNC: 19 U/L — SIGNIFICANT CHANGE UP (ref 0–41)
ANION GAP SERPL CALC-SCNC: 10 MMOL/L — SIGNIFICANT CHANGE UP (ref 7–14)
APPEARANCE UR: CLEAR — SIGNIFICANT CHANGE UP
APTT BLD: 51.4 SEC — HIGH (ref 27–39.2)
AST SERPL-CCNC: 20 U/L — SIGNIFICANT CHANGE UP (ref 0–41)
BASOPHILS # BLD AUTO: 0.02 K/UL — SIGNIFICANT CHANGE UP (ref 0–0.2)
BASOPHILS NFR BLD AUTO: 0.3 % — SIGNIFICANT CHANGE UP (ref 0–1)
BILIRUB SERPL-MCNC: 0.4 MG/DL — SIGNIFICANT CHANGE UP (ref 0.2–1.2)
BILIRUB UR-MCNC: NEGATIVE — SIGNIFICANT CHANGE UP
BUN SERPL-MCNC: 27 MG/DL — HIGH (ref 10–20)
CALCIUM SERPL-MCNC: 9.2 MG/DL — SIGNIFICANT CHANGE UP (ref 8.5–10.1)
CHLORIDE SERPL-SCNC: 99 MMOL/L — SIGNIFICANT CHANGE UP (ref 98–110)
CO2 SERPL-SCNC: 33 MMOL/L — HIGH (ref 17–32)
COLOR SPEC: YELLOW — SIGNIFICANT CHANGE UP
CREAT SERPL-MCNC: 1.3 MG/DL — SIGNIFICANT CHANGE UP (ref 0.7–1.5)
DIFF PNL FLD: (no result)
EOSINOPHIL # BLD AUTO: 0.07 K/UL — SIGNIFICANT CHANGE UP (ref 0–0.7)
EOSINOPHIL NFR BLD AUTO: 0.9 % — SIGNIFICANT CHANGE UP (ref 0–8)
GLUCOSE SERPL-MCNC: 90 MG/DL — SIGNIFICANT CHANGE UP (ref 70–99)
GLUCOSE UR QL: NEGATIVE MG/DL — SIGNIFICANT CHANGE UP
HCT VFR BLD CALC: 46.8 % — SIGNIFICANT CHANGE UP (ref 42–52)
HGB BLD-MCNC: 15.2 G/DL — SIGNIFICANT CHANGE UP (ref 14–18)
IMM GRANULOCYTES NFR BLD AUTO: 0.4 % — HIGH (ref 0.1–0.3)
INR BLD: 3.97 RATIO — HIGH (ref 0.65–1.3)
KETONES UR-MCNC: NEGATIVE — SIGNIFICANT CHANGE UP
LEUKOCYTE ESTERASE UR-ACNC: (no result)
LYMPHOCYTES # BLD AUTO: 1.42 K/UL — SIGNIFICANT CHANGE UP (ref 1.2–3.4)
LYMPHOCYTES # BLD AUTO: 17.9 % — LOW (ref 20.5–51.1)
MCHC RBC-ENTMCNC: 28.9 PG — SIGNIFICANT CHANGE UP (ref 27–31)
MCHC RBC-ENTMCNC: 32.5 G/DL — SIGNIFICANT CHANGE UP (ref 32–37)
MCV RBC AUTO: 89 FL — SIGNIFICANT CHANGE UP (ref 80–94)
MONOCYTES # BLD AUTO: 0.74 K/UL — HIGH (ref 0.1–0.6)
MONOCYTES NFR BLD AUTO: 9.3 % — SIGNIFICANT CHANGE UP (ref 1.7–9.3)
NEUTROPHILS # BLD AUTO: 5.66 K/UL — SIGNIFICANT CHANGE UP (ref 1.4–6.5)
NEUTROPHILS NFR BLD AUTO: 71.2 % — SIGNIFICANT CHANGE UP (ref 42.2–75.2)
NITRITE UR-MCNC: NEGATIVE — SIGNIFICANT CHANGE UP
PH UR: 6.5 — SIGNIFICANT CHANGE UP (ref 5–8)
PLATELET # BLD AUTO: 190 K/UL — SIGNIFICANT CHANGE UP (ref 130–400)
POTASSIUM SERPL-MCNC: 4.4 MMOL/L — SIGNIFICANT CHANGE UP (ref 3.5–5)
POTASSIUM SERPL-SCNC: 4.4 MMOL/L — SIGNIFICANT CHANGE UP (ref 3.5–5)
PROT SERPL-MCNC: 7.7 G/DL — SIGNIFICANT CHANGE UP (ref 6–8)
PROT UR-MCNC: NEGATIVE MG/DL — SIGNIFICANT CHANGE UP
PROTHROM AB SERPL-ACNC: >40 SEC — HIGH (ref 9.95–12.87)
RBC # BLD: 5.26 M/UL — SIGNIFICANT CHANGE UP (ref 4.7–6.1)
RBC # FLD: 13.2 % — SIGNIFICANT CHANGE UP (ref 11.5–14.5)
SODIUM SERPL-SCNC: 142 MMOL/L — SIGNIFICANT CHANGE UP (ref 135–146)
SP GR SPEC: 1.01 — SIGNIFICANT CHANGE UP (ref 1.01–1.03)
UROBILINOGEN FLD QL: 0.2 MG/DL — SIGNIFICANT CHANGE UP (ref 0.2–0.2)
WBC # BLD: 7.94 K/UL — SIGNIFICANT CHANGE UP (ref 4.8–10.8)
WBC # FLD AUTO: 7.94 K/UL — SIGNIFICANT CHANGE UP (ref 4.8–10.8)

## 2018-04-30 RX ORDER — AMIODARONE HYDROCHLORIDE 400 MG/1
400 TABLET ORAL
Qty: 0 | Refills: 0 | COMMUNITY

## 2018-04-30 NOTE — H&P PST ADULT - PMH
Atrial fibrillation, unspecified type  on warfarin  Cancer  Basal Cell / Squamous Cell  Chronic obstructive pulmonary disease, unspecified COPD type    Chronic systolic congestive heart failure    ROMERO (dyspnea on exertion)    Former smoker, stopped smoking many years ago    Hyperlipidemia, unspecified hyperlipidemia type    ICD (implantable cardioverter-defibrillator) in place    MI, old  MI / Cardiac Arrest 1995  Osteoarthritis    Other irritable bowel syndrome Atrial fibrillation, unspecified type  on warfarin  CAD (coronary artery disease)    Cancer  Basal Cell / Squamous Cell  Chronic obstructive pulmonary disease, unspecified COPD type    Chronic systolic congestive heart failure    ROMERO (dyspnea on exertion)    Former smoker, stopped smoking many years ago    Hyperlipidemia, unspecified hyperlipidemia type    ICD (implantable cardioverter-defibrillator) in place    MI, old  MI / Cardiac Arrest 1995  Osteoarthritis    Other irritable bowel syndrome

## 2018-04-30 NOTE — H&P PST ADULT - HISTORY OF PRESENT ILLNESS
77 y/o male reports cancer lesions to left cheek and left 4th finger (cuticle); elected for procedure. Denies chest pain, palp, SOB, cough, fever, recent URI / UTI.  Ambs > 2 blks / 2 FOS with mild ROMERO.   ** Echo 6/23/16: EF 20-25%.   * Cath (1/112/16): Significant VVD 75 y/o male reports cancer lesions to left cheek and left 4th finger (cuticle); elected for procedure. Denies chest pain, palp, SOB, cough, fever, recent URI / UTI.  Ambs > 2 blks / 2 FOS with mild ROMERO.   ** Echo 6/23/16: EF 20-25%.   * Cath (1/112/16): Significant Single Vessel CAD.

## 2018-04-30 NOTE — H&P PST ADULT - PSH
H/O surgery to heart and great vessels, presenting hazards to health  AVR (2015 / MVR (1995)  H/O surgery to major organs, presenting hazards to health  Cholecystectomy 2017  H/O surgery to major organs, presenting hazards to health  ICD Implant 2012  Right inguinal hernia  2006

## 2018-04-30 NOTE — H&P PST ADULT - FAMILY HISTORY
Sibling  Still living? Unknown  Family history of heart disease, Age at diagnosis: Age Unknown  Cancer, Age at diagnosis: Age Unknown

## 2018-05-01 ENCOUNTER — OUTPATIENT (OUTPATIENT)
Dept: OUTPATIENT SERVICES | Facility: HOSPITAL | Age: 78
LOS: 1 days | Discharge: HOME | End: 2018-05-01

## 2018-05-01 DIAGNOSIS — Z95.2 PRESENCE OF PROSTHETIC HEART VALVE: ICD-10-CM

## 2018-05-01 DIAGNOSIS — Z98.890 OTHER SPECIFIED POSTPROCEDURAL STATES: Chronic | ICD-10-CM

## 2018-05-01 DIAGNOSIS — K40.90 UNILATERAL INGUINAL HERNIA, WITHOUT OBSTRUCTION OR GANGRENE, NOT SPECIFIED AS RECURRENT: Chronic | ICD-10-CM

## 2018-05-01 DIAGNOSIS — Z79.01 LONG TERM (CURRENT) USE OF ANTICOAGULANTS: ICD-10-CM

## 2018-05-11 ENCOUNTER — APPOINTMENT (OUTPATIENT)
Dept: PLASTIC SURGERY | Facility: CLINIC | Age: 78
End: 2018-05-11
Payer: COMMERCIAL

## 2018-05-11 ENCOUNTER — OUTPATIENT (OUTPATIENT)
Dept: OUTPATIENT SERVICES | Facility: HOSPITAL | Age: 78
LOS: 1 days | Discharge: HOME | End: 2018-05-11

## 2018-05-11 DIAGNOSIS — Z98.890 OTHER SPECIFIED POSTPROCEDURAL STATES: Chronic | ICD-10-CM

## 2018-05-11 DIAGNOSIS — Z95.2 PRESENCE OF PROSTHETIC HEART VALVE: ICD-10-CM

## 2018-05-11 DIAGNOSIS — K40.90 UNILATERAL INGUINAL HERNIA, WITHOUT OBSTRUCTION OR GANGRENE, NOT SPECIFIED AS RECURRENT: Chronic | ICD-10-CM

## 2018-05-11 DIAGNOSIS — Z79.01 LONG TERM (CURRENT) USE OF ANTICOAGULANTS: ICD-10-CM

## 2018-05-11 PROCEDURE — 99212 OFFICE O/P EST SF 10 MIN: CPT

## 2018-05-11 NOTE — ASU PATIENT PROFILE, ADULT - PMH
Atrial fibrillation, unspecified type  on warfarin  CAD (coronary artery disease)    Cancer  Basal Cell / Squamous Cell  Chronic obstructive pulmonary disease, unspecified COPD type    Chronic systolic congestive heart failure    ROMERO (dyspnea on exertion)    Former smoker, stopped smoking many years ago    Hyperlipidemia, unspecified hyperlipidemia type    ICD (implantable cardioverter-defibrillator) in place    MI, old  MI / Cardiac Arrest 1995  Osteoarthritis    Other irritable bowel syndrome

## 2018-05-12 ENCOUNTER — OUTPATIENT (OUTPATIENT)
Dept: OUTPATIENT SERVICES | Facility: HOSPITAL | Age: 78
LOS: 1 days | Discharge: HOME | End: 2018-05-12

## 2018-05-12 DIAGNOSIS — Z79.01 LONG TERM (CURRENT) USE OF ANTICOAGULANTS: ICD-10-CM

## 2018-05-12 DIAGNOSIS — K40.90 UNILATERAL INGUINAL HERNIA, WITHOUT OBSTRUCTION OR GANGRENE, NOT SPECIFIED AS RECURRENT: Chronic | ICD-10-CM

## 2018-05-12 DIAGNOSIS — Z98.890 OTHER SPECIFIED POSTPROCEDURAL STATES: Chronic | ICD-10-CM

## 2018-05-12 DIAGNOSIS — Z95.2 PRESENCE OF PROSTHETIC HEART VALVE: ICD-10-CM

## 2018-05-14 ENCOUNTER — RESULT REVIEW (OUTPATIENT)
Age: 78
End: 2018-05-14

## 2018-05-14 ENCOUNTER — OUTPATIENT (OUTPATIENT)
Dept: OUTPATIENT SERVICES | Facility: HOSPITAL | Age: 78
LOS: 1 days | Discharge: HOME | End: 2018-05-14

## 2018-05-14 ENCOUNTER — APPOINTMENT (OUTPATIENT)
Dept: PLASTIC SURGERY | Facility: AMBULATORY SURGERY CENTER | Age: 78
End: 2018-05-14
Payer: COMMERCIAL

## 2018-05-14 VITALS
RESPIRATION RATE: 18 BRPM | OXYGEN SATURATION: 93 % | HEART RATE: 67 BPM | TEMPERATURE: 97 F | SYSTOLIC BLOOD PRESSURE: 121 MMHG | DIASTOLIC BLOOD PRESSURE: 57 MMHG

## 2018-05-14 VITALS
OXYGEN SATURATION: 99 % | HEART RATE: 63 BPM | WEIGHT: 160.06 LBS | DIASTOLIC BLOOD PRESSURE: 71 MMHG | HEIGHT: 65 IN | SYSTOLIC BLOOD PRESSURE: 153 MMHG | TEMPERATURE: 98 F | RESPIRATION RATE: 18 BRPM

## 2018-05-14 DIAGNOSIS — Z98.890 OTHER SPECIFIED POSTPROCEDURAL STATES: Chronic | ICD-10-CM

## 2018-05-14 DIAGNOSIS — K40.90 UNILATERAL INGUINAL HERNIA, WITHOUT OBSTRUCTION OR GANGRENE, NOT SPECIFIED AS RECURRENT: Chronic | ICD-10-CM

## 2018-05-14 PROCEDURE — 26951 AMPUTATION OF FINGER/THUMB: CPT | Mod: 59

## 2018-05-14 PROCEDURE — 14040 TIS TRNFR F/C/C/M/N/A/G/H/F: CPT

## 2018-05-14 RX ORDER — ONDANSETRON 8 MG/1
4 TABLET, FILM COATED ORAL ONCE
Qty: 0 | Refills: 0 | Status: DISCONTINUED | OUTPATIENT
Start: 2018-05-14 | End: 2018-05-29

## 2018-05-14 RX ORDER — TRAMADOL HYDROCHLORIDE 50 MG/1
1 TABLET ORAL
Qty: 10 | Refills: 0 | OUTPATIENT
Start: 2018-05-14 | End: 2018-05-16

## 2018-05-14 RX ORDER — MEPERIDINE HYDROCHLORIDE 50 MG/ML
12.5 INJECTION INTRAMUSCULAR; INTRAVENOUS; SUBCUTANEOUS ONCE
Qty: 0 | Refills: 0 | Status: DISCONTINUED | OUTPATIENT
Start: 2018-05-14 | End: 2018-05-14

## 2018-05-14 RX ORDER — OXYCODONE AND ACETAMINOPHEN 5; 325 MG/1; MG/1
1 TABLET ORAL ONCE
Qty: 0 | Refills: 0 | Status: DISCONTINUED | OUTPATIENT
Start: 2018-05-14 | End: 2018-05-14

## 2018-05-14 RX ORDER — MORPHINE SULFATE 50 MG/1
2 CAPSULE, EXTENDED RELEASE ORAL
Qty: 0 | Refills: 0 | Status: DISCONTINUED | OUTPATIENT
Start: 2018-05-14 | End: 2018-05-14

## 2018-05-14 RX ORDER — MORPHINE SULFATE 50 MG/1
4 CAPSULE, EXTENDED RELEASE ORAL
Qty: 0 | Refills: 0 | Status: DISCONTINUED | OUTPATIENT
Start: 2018-05-14 | End: 2018-05-14

## 2018-05-14 RX ORDER — CEPHALEXIN 500 MG
1 CAPSULE ORAL
Qty: 9 | Refills: 0 | OUTPATIENT
Start: 2018-05-14 | End: 2018-05-16

## 2018-05-14 NOTE — BRIEF OPERATIVE NOTE - PRE-OP DX
Finger wound, simple, open, initial encounter  05/14/2018  Left hand, fourth digit, dorsal aspect, distal phalanx  Quan Alvarez  Mohs defect of left cheek  05/14/2018    Quan Alvarez

## 2018-05-14 NOTE — BRIEF OPERATIVE NOTE - PROCEDURE
<<-----Click on this checkbox to enter Procedure Rearrangement of local tissue  05/14/2018  Left cheek  Active  [email or web address]  Partial amputation of left ring finger  05/14/2018  Left fourth digit distal phalanx  Active  [email or web address]

## 2018-05-14 NOTE — ASU DISCHARGE PLAN (ADULT/PEDIATRIC). - SPECIAL INSTRUCTIONS
Keep left cheek and hand clean and dry. Do not get wet!  Sleep with head elevated.  Keep left hand elevated to reduce swelling.  May ice left cheek.     Please continue Lovenox daily for 3 days until INR is checked on Thursday.   Start Coumadin as instructed by your cardiologist.

## 2018-05-14 NOTE — BRIEF OPERATIVE NOTE - OPERATION/FINDINGS
MOHS defect of Left cheek inferior to lower eyelid 3cm x 1.5cm closed with local tissue rearrangement  MOHS defect of Left fourth digit dorsal aspect 2cm x 1cm with positive margin on pathology, distal phalanx amputated at DIP, closed primarily

## 2018-05-16 DIAGNOSIS — Z48.3 AFTERCARE FOLLOWING SURGERY FOR NEOPLASM: ICD-10-CM

## 2018-05-16 DIAGNOSIS — J44.9 CHRONIC OBSTRUCTIVE PULMONARY DISEASE, UNSPECIFIED: ICD-10-CM

## 2018-05-16 DIAGNOSIS — E78.5 HYPERLIPIDEMIA, UNSPECIFIED: ICD-10-CM

## 2018-05-16 DIAGNOSIS — Z95.810 PRESENCE OF AUTOMATIC (IMPLANTABLE) CARDIAC DEFIBRILLATOR: ICD-10-CM

## 2018-05-16 DIAGNOSIS — Z79.01 LONG TERM (CURRENT) USE OF ANTICOAGULANTS: ICD-10-CM

## 2018-05-16 DIAGNOSIS — I48.91 UNSPECIFIED ATRIAL FIBRILLATION: ICD-10-CM

## 2018-05-16 DIAGNOSIS — Z85.828 PERSONAL HISTORY OF OTHER MALIGNANT NEOPLASM OF SKIN: ICD-10-CM

## 2018-05-16 DIAGNOSIS — I50.9 HEART FAILURE, UNSPECIFIED: ICD-10-CM

## 2018-05-16 DIAGNOSIS — Z87.891 PERSONAL HISTORY OF NICOTINE DEPENDENCE: ICD-10-CM

## 2018-05-17 ENCOUNTER — OUTPATIENT (OUTPATIENT)
Dept: OUTPATIENT SERVICES | Facility: HOSPITAL | Age: 78
LOS: 1 days | Discharge: HOME | End: 2018-05-17

## 2018-05-17 DIAGNOSIS — Z98.890 OTHER SPECIFIED POSTPROCEDURAL STATES: Chronic | ICD-10-CM

## 2018-05-17 DIAGNOSIS — K40.90 UNILATERAL INGUINAL HERNIA, WITHOUT OBSTRUCTION OR GANGRENE, NOT SPECIFIED AS RECURRENT: Chronic | ICD-10-CM

## 2018-05-17 DIAGNOSIS — Z79.01 LONG TERM (CURRENT) USE OF ANTICOAGULANTS: ICD-10-CM

## 2018-05-17 DIAGNOSIS — Z95.2 PRESENCE OF PROSTHETIC HEART VALVE: ICD-10-CM

## 2018-05-20 LAB — SURGICAL PATHOLOGY STUDY: SIGNIFICANT CHANGE UP

## 2018-05-21 ENCOUNTER — APPOINTMENT (OUTPATIENT)
Dept: PLASTIC SURGERY | Facility: CLINIC | Age: 78
End: 2018-05-21
Payer: COMMERCIAL

## 2018-05-21 PROCEDURE — 99024 POSTOP FOLLOW-UP VISIT: CPT

## 2018-05-22 ENCOUNTER — OUTPATIENT (OUTPATIENT)
Dept: OUTPATIENT SERVICES | Facility: HOSPITAL | Age: 78
LOS: 1 days | Discharge: HOME | End: 2018-05-22

## 2018-05-22 DIAGNOSIS — Z79.01 LONG TERM (CURRENT) USE OF ANTICOAGULANTS: ICD-10-CM

## 2018-05-22 DIAGNOSIS — Z98.890 OTHER SPECIFIED POSTPROCEDURAL STATES: Chronic | ICD-10-CM

## 2018-05-22 DIAGNOSIS — K40.90 UNILATERAL INGUINAL HERNIA, WITHOUT OBSTRUCTION OR GANGRENE, NOT SPECIFIED AS RECURRENT: Chronic | ICD-10-CM

## 2018-05-22 DIAGNOSIS — Z95.2 PRESENCE OF PROSTHETIC HEART VALVE: ICD-10-CM

## 2018-05-30 ENCOUNTER — APPOINTMENT (OUTPATIENT)
Dept: PLASTIC SURGERY | Facility: CLINIC | Age: 78
End: 2018-05-30
Payer: COMMERCIAL

## 2018-05-30 PROCEDURE — 99024 POSTOP FOLLOW-UP VISIT: CPT

## 2018-05-30 RX ORDER — CARISOPRODOL 350 MG/1
350 TABLET ORAL
Qty: 120 | Refills: 0 | Status: ACTIVE | COMMUNITY
Start: 2018-05-23

## 2018-05-30 RX ORDER — DICYCLOMINE HYDROCHLORIDE 20 MG/1
20 TABLET ORAL
Refills: 0 | Status: ACTIVE | COMMUNITY

## 2018-06-05 ENCOUNTER — OUTPATIENT (OUTPATIENT)
Dept: OUTPATIENT SERVICES | Facility: HOSPITAL | Age: 78
LOS: 1 days | Discharge: HOME | End: 2018-06-05

## 2018-06-05 DIAGNOSIS — Z98.890 OTHER SPECIFIED POSTPROCEDURAL STATES: Chronic | ICD-10-CM

## 2018-06-05 DIAGNOSIS — Z79.01 LONG TERM (CURRENT) USE OF ANTICOAGULANTS: ICD-10-CM

## 2018-06-05 DIAGNOSIS — K40.90 UNILATERAL INGUINAL HERNIA, WITHOUT OBSTRUCTION OR GANGRENE, NOT SPECIFIED AS RECURRENT: Chronic | ICD-10-CM

## 2018-06-05 DIAGNOSIS — Z95.2 PRESENCE OF PROSTHETIC HEART VALVE: ICD-10-CM

## 2018-06-07 ENCOUNTER — APPOINTMENT (OUTPATIENT)
Dept: PLASTIC SURGERY | Facility: CLINIC | Age: 78
End: 2018-06-07
Payer: COMMERCIAL

## 2018-06-07 DIAGNOSIS — S68.119A COMPLETE TRAUMATIC METACARPOPHALANGEAL AMPUTATION OF UNSPECIFIED FINGER, INITIAL ENCOUNTER: ICD-10-CM

## 2018-06-07 PROCEDURE — 99024 POSTOP FOLLOW-UP VISIT: CPT

## 2018-06-07 RX ORDER — GABAPENTIN 100 MG/1
100 CAPSULE ORAL TWICE DAILY
Qty: 30 | Refills: 0 | Status: ACTIVE | COMMUNITY
Start: 2018-06-07 | End: 1900-01-01

## 2018-06-14 ENCOUNTER — APPOINTMENT (OUTPATIENT)
Dept: PLASTIC SURGERY | Facility: CLINIC | Age: 78
End: 2018-06-14
Payer: COMMERCIAL

## 2018-06-14 PROCEDURE — 99024 POSTOP FOLLOW-UP VISIT: CPT

## 2018-06-19 ENCOUNTER — APPOINTMENT (OUTPATIENT)
Dept: PLASTIC SURGERY | Facility: CLINIC | Age: 78
End: 2018-06-19
Payer: COMMERCIAL

## 2018-06-19 PROCEDURE — 99024 POSTOP FOLLOW-UP VISIT: CPT

## 2018-07-03 ENCOUNTER — OUTPATIENT (OUTPATIENT)
Dept: OUTPATIENT SERVICES | Facility: HOSPITAL | Age: 78
LOS: 1 days | Discharge: HOME | End: 2018-07-03

## 2018-07-03 DIAGNOSIS — Z98.890 OTHER SPECIFIED POSTPROCEDURAL STATES: Chronic | ICD-10-CM

## 2018-07-03 DIAGNOSIS — Z95.2 PRESENCE OF PROSTHETIC HEART VALVE: ICD-10-CM

## 2018-07-03 DIAGNOSIS — Z79.01 LONG TERM (CURRENT) USE OF ANTICOAGULANTS: ICD-10-CM

## 2018-07-03 DIAGNOSIS — K40.90 UNILATERAL INGUINAL HERNIA, WITHOUT OBSTRUCTION OR GANGRENE, NOT SPECIFIED AS RECURRENT: Chronic | ICD-10-CM

## 2018-07-17 ENCOUNTER — APPOINTMENT (OUTPATIENT)
Dept: PLASTIC SURGERY | Facility: CLINIC | Age: 78
End: 2018-07-17
Payer: COMMERCIAL

## 2018-07-17 PROBLEM — I50.22 CHRONIC SYSTOLIC (CONGESTIVE) HEART FAILURE: Chronic | Status: ACTIVE | Noted: 2018-04-30

## 2018-07-17 PROBLEM — Z95.810 PRESENCE OF AUTOMATIC (IMPLANTABLE) CARDIAC DEFIBRILLATOR: Chronic | Status: ACTIVE | Noted: 2018-04-30

## 2018-07-17 PROBLEM — Z87.891 PERSONAL HISTORY OF NICOTINE DEPENDENCE: Chronic | Status: ACTIVE | Noted: 2018-04-30

## 2018-07-17 PROBLEM — I25.10 ATHEROSCLEROTIC HEART DISEASE OF NATIVE CORONARY ARTERY WITHOUT ANGINA PECTORIS: Chronic | Status: ACTIVE | Noted: 2018-04-30

## 2018-07-17 PROBLEM — I25.2 OLD MYOCARDIAL INFARCTION: Chronic | Status: ACTIVE | Noted: 2018-04-30

## 2018-07-17 PROBLEM — J44.9 CHRONIC OBSTRUCTIVE PULMONARY DISEASE, UNSPECIFIED: Chronic | Status: ACTIVE | Noted: 2018-04-30

## 2018-07-17 PROBLEM — E78.5 HYPERLIPIDEMIA, UNSPECIFIED: Chronic | Status: ACTIVE | Noted: 2018-04-30

## 2018-07-17 PROBLEM — R06.09 OTHER FORMS OF DYSPNEA: Chronic | Status: ACTIVE | Noted: 2018-04-30

## 2018-07-17 PROBLEM — M19.90 UNSPECIFIED OSTEOARTHRITIS, UNSPECIFIED SITE: Chronic | Status: ACTIVE | Noted: 2018-04-30

## 2018-07-17 PROBLEM — K58.8 OTHER IRRITABLE BOWEL SYNDROME: Chronic | Status: ACTIVE | Noted: 2018-04-30

## 2018-07-17 PROBLEM — C80.1 MALIGNANT (PRIMARY) NEOPLASM, UNSPECIFIED: Chronic | Status: ACTIVE | Noted: 2018-04-30

## 2018-07-17 PROCEDURE — 99024 POSTOP FOLLOW-UP VISIT: CPT

## 2018-07-31 ENCOUNTER — OUTPATIENT (OUTPATIENT)
Dept: OUTPATIENT SERVICES | Facility: HOSPITAL | Age: 78
LOS: 1 days | Discharge: HOME | End: 2018-07-31

## 2018-07-31 DIAGNOSIS — Z98.890 OTHER SPECIFIED POSTPROCEDURAL STATES: Chronic | ICD-10-CM

## 2018-07-31 DIAGNOSIS — Z95.2 PRESENCE OF PROSTHETIC HEART VALVE: ICD-10-CM

## 2018-07-31 DIAGNOSIS — K40.90 UNILATERAL INGUINAL HERNIA, WITHOUT OBSTRUCTION OR GANGRENE, NOT SPECIFIED AS RECURRENT: Chronic | ICD-10-CM

## 2018-07-31 DIAGNOSIS — Z79.01 LONG TERM (CURRENT) USE OF ANTICOAGULANTS: ICD-10-CM

## 2018-08-08 ENCOUNTER — OUTPATIENT (OUTPATIENT)
Dept: OUTPATIENT SERVICES | Facility: HOSPITAL | Age: 78
LOS: 1 days | Discharge: HOME | End: 2018-08-08

## 2018-08-08 DIAGNOSIS — Z98.890 OTHER SPECIFIED POSTPROCEDURAL STATES: Chronic | ICD-10-CM

## 2018-08-08 DIAGNOSIS — Z95.2 PRESENCE OF PROSTHETIC HEART VALVE: ICD-10-CM

## 2018-08-08 DIAGNOSIS — Z79.01 LONG TERM (CURRENT) USE OF ANTICOAGULANTS: ICD-10-CM

## 2018-08-08 DIAGNOSIS — K40.90 UNILATERAL INGUINAL HERNIA, WITHOUT OBSTRUCTION OR GANGRENE, NOT SPECIFIED AS RECURRENT: Chronic | ICD-10-CM

## 2018-08-09 ENCOUNTER — OUTPATIENT (OUTPATIENT)
Dept: OUTPATIENT SERVICES | Facility: HOSPITAL | Age: 78
LOS: 1 days | Discharge: HOME | End: 2018-08-09

## 2018-08-09 VITALS
DIASTOLIC BLOOD PRESSURE: 74 MMHG | WEIGHT: 162.04 LBS | SYSTOLIC BLOOD PRESSURE: 122 MMHG | HEIGHT: 65 IN | TEMPERATURE: 97 F | HEART RATE: 60 BPM | OXYGEN SATURATION: 100 % | RESPIRATION RATE: 16 BRPM

## 2018-08-09 DIAGNOSIS — T82.111A BREAKDOWN (MECHANICAL) OF CARDIAC PULSE GENERATOR (BATTERY), INITIAL ENCOUNTER: ICD-10-CM

## 2018-08-09 DIAGNOSIS — Z45.9 ENCOUNTER FOR ADJUSTMENT AND MANAGEMENT OF UNSPECIFIED IMPLANTED DEVICE: ICD-10-CM

## 2018-08-09 DIAGNOSIS — Z98.890 OTHER SPECIFIED POSTPROCEDURAL STATES: Chronic | ICD-10-CM

## 2018-08-09 DIAGNOSIS — K40.90 UNILATERAL INGUINAL HERNIA, WITHOUT OBSTRUCTION OR GANGRENE, NOT SPECIFIED AS RECURRENT: Chronic | ICD-10-CM

## 2018-08-09 DIAGNOSIS — Z01.818 ENCOUNTER FOR OTHER PREPROCEDURAL EXAMINATION: ICD-10-CM

## 2018-08-09 DIAGNOSIS — I48.91 UNSPECIFIED ATRIAL FIBRILLATION: ICD-10-CM

## 2018-08-09 DIAGNOSIS — I50.9 HEART FAILURE, UNSPECIFIED: ICD-10-CM

## 2018-08-09 DIAGNOSIS — E78.00 PURE HYPERCHOLESTEROLEMIA, UNSPECIFIED: ICD-10-CM

## 2018-08-09 DIAGNOSIS — I25.10 ATHEROSCLEROTIC HEART DISEASE OF NATIVE CORONARY ARTERY WITHOUT ANGINA PECTORIS: ICD-10-CM

## 2018-08-09 DIAGNOSIS — Z95.1 PRESENCE OF AORTOCORONARY BYPASS GRAFT: Chronic | ICD-10-CM

## 2018-08-09 DIAGNOSIS — J44.9 CHRONIC OBSTRUCTIVE PULMONARY DISEASE, UNSPECIFIED: ICD-10-CM

## 2018-08-09 DIAGNOSIS — K76.9 LIVER DISEASE, UNSPECIFIED: ICD-10-CM

## 2018-08-09 DIAGNOSIS — S68.119A COMPLETE TRAUMATIC METACARPOPHALANGEAL AMPUTATION OF UNSPECIFIED FINGER, INITIAL ENCOUNTER: Chronic | ICD-10-CM

## 2018-08-09 DIAGNOSIS — I49.9 CARDIAC ARRHYTHMIA, UNSPECIFIED: ICD-10-CM

## 2018-08-09 LAB
ALBUMIN SERPL ELPH-MCNC: 4.6 G/DL — SIGNIFICANT CHANGE UP (ref 3.5–5.2)
ALP SERPL-CCNC: 68 U/L — SIGNIFICANT CHANGE UP (ref 30–115)
ALT FLD-CCNC: 20 U/L — SIGNIFICANT CHANGE UP (ref 0–41)
ANION GAP SERPL CALC-SCNC: 17 MMOL/L — HIGH (ref 7–14)
APPEARANCE UR: CLEAR — SIGNIFICANT CHANGE UP
APTT BLD: 49.2 SEC — HIGH (ref 27–39.2)
AST SERPL-CCNC: 21 U/L — SIGNIFICANT CHANGE UP (ref 0–41)
BASOPHILS # BLD AUTO: 0.03 K/UL — SIGNIFICANT CHANGE UP (ref 0–0.2)
BASOPHILS NFR BLD AUTO: 0.4 % — SIGNIFICANT CHANGE UP (ref 0–1)
BILIRUB SERPL-MCNC: 0.4 MG/DL — SIGNIFICANT CHANGE UP (ref 0.2–1.2)
BILIRUB UR-MCNC: NEGATIVE — SIGNIFICANT CHANGE UP
BLD GP AB SCN SERPL QL: SIGNIFICANT CHANGE UP
BUN SERPL-MCNC: 34 MG/DL — HIGH (ref 10–20)
CALCIUM SERPL-MCNC: 9.3 MG/DL — SIGNIFICANT CHANGE UP (ref 8.5–10.1)
CHLORIDE SERPL-SCNC: 99 MMOL/L — SIGNIFICANT CHANGE UP (ref 98–110)
CO2 SERPL-SCNC: 27 MMOL/L — SIGNIFICANT CHANGE UP (ref 17–32)
COLOR SPEC: YELLOW — SIGNIFICANT CHANGE UP
CREAT SERPL-MCNC: 1.5 MG/DL — SIGNIFICANT CHANGE UP (ref 0.7–1.5)
DIFF PNL FLD: ABNORMAL
EOSINOPHIL # BLD AUTO: 0.08 K/UL — SIGNIFICANT CHANGE UP (ref 0–0.7)
EOSINOPHIL NFR BLD AUTO: 1 % — SIGNIFICANT CHANGE UP (ref 0–8)
GLUCOSE SERPL-MCNC: 81 MG/DL — SIGNIFICANT CHANGE UP (ref 70–99)
GLUCOSE UR QL: NEGATIVE MG/DL — SIGNIFICANT CHANGE UP
HCT VFR BLD CALC: 46 % — SIGNIFICANT CHANGE UP (ref 42–52)
HGB BLD-MCNC: 14.9 G/DL — SIGNIFICANT CHANGE UP (ref 14–18)
IMM GRANULOCYTES NFR BLD AUTO: 0.5 % — HIGH (ref 0.1–0.3)
INR BLD: 2.63 RATIO — HIGH (ref 0.65–1.3)
KETONES UR-MCNC: NEGATIVE — SIGNIFICANT CHANGE UP
LEUKOCYTE ESTERASE UR-ACNC: NEGATIVE — SIGNIFICANT CHANGE UP
LYMPHOCYTES # BLD AUTO: 1.41 K/UL — SIGNIFICANT CHANGE UP (ref 1.2–3.4)
LYMPHOCYTES # BLD AUTO: 17.6 % — LOW (ref 20.5–51.1)
MCHC RBC-ENTMCNC: 28.8 PG — SIGNIFICANT CHANGE UP (ref 27–31)
MCHC RBC-ENTMCNC: 32.4 G/DL — SIGNIFICANT CHANGE UP (ref 32–37)
MCV RBC AUTO: 89 FL — SIGNIFICANT CHANGE UP (ref 80–94)
MONOCYTES # BLD AUTO: 0.76 K/UL — HIGH (ref 0.1–0.6)
MONOCYTES NFR BLD AUTO: 9.5 % — HIGH (ref 1.7–9.3)
NEUTROPHILS # BLD AUTO: 5.67 K/UL — SIGNIFICANT CHANGE UP (ref 1.4–6.5)
NEUTROPHILS NFR BLD AUTO: 71 % — SIGNIFICANT CHANGE UP (ref 42.2–75.2)
NITRITE UR-MCNC: NEGATIVE — SIGNIFICANT CHANGE UP
NRBC # BLD: 0 /100 WBCS — SIGNIFICANT CHANGE UP (ref 0–0)
PH UR: 6.5 — SIGNIFICANT CHANGE UP (ref 5–8)
PLATELET # BLD AUTO: 188 K/UL — SIGNIFICANT CHANGE UP (ref 130–400)
POTASSIUM SERPL-MCNC: 4 MMOL/L — SIGNIFICANT CHANGE UP (ref 3.5–5)
POTASSIUM SERPL-SCNC: 4 MMOL/L — SIGNIFICANT CHANGE UP (ref 3.5–5)
PROT SERPL-MCNC: 7.8 G/DL — SIGNIFICANT CHANGE UP (ref 6–8)
PROT UR-MCNC: NEGATIVE MG/DL — SIGNIFICANT CHANGE UP
PROTHROM AB SERPL-ACNC: 28.1 SEC — HIGH (ref 9.95–12.87)
RBC # BLD: 5.17 M/UL — SIGNIFICANT CHANGE UP (ref 4.7–6.1)
RBC # FLD: 13.3 % — SIGNIFICANT CHANGE UP (ref 11.5–14.5)
RBC CASTS # UR COMP ASSIST: ABNORMAL /HPF
SODIUM SERPL-SCNC: 143 MMOL/L — SIGNIFICANT CHANGE UP (ref 135–146)
SP GR SPEC: 1.01 — SIGNIFICANT CHANGE UP (ref 1.01–1.03)
TYPE + AB SCN PNL BLD: SIGNIFICANT CHANGE UP
UROBILINOGEN FLD QL: 0.2 MG/DL — SIGNIFICANT CHANGE UP (ref 0.2–0.2)
WBC # BLD: 7.99 K/UL — SIGNIFICANT CHANGE UP (ref 4.8–10.8)
WBC # FLD AUTO: 7.99 K/UL — SIGNIFICANT CHANGE UP (ref 4.8–10.8)

## 2018-08-09 NOTE — H&P PST ADULT - PSH
Amputation finger  LEFT 4TH SECONDARY TO BASAL CELL  H/O squamous cell carcinoma excision  BELOW LEFT EYE  H/O surgery to heart and great vessels, presenting hazards to health  AVR (2015 / MVR (1995)  H/O surgery to major organs, presenting hazards to health  Cholecystectomy 2017  H/O surgery to major organs, presenting hazards to health  ICD Implant 2012  Right inguinal hernia  2006 Amputation finger  LEFT 4TH SECONDARY TO BASAL CELL  H/O squamous cell carcinoma excision  BELOW LEFT EYE  H/O surgery to heart and great vessels, presenting hazards to health  AVR (2015 / MVR (1995)  H/O surgery to major organs, presenting hazards to health  Cholecystectomy 2017  H/O surgery to major organs, presenting hazards to health  ICD Implant 2012  Right inguinal hernia  2006  S/P CABG x 1

## 2018-08-09 NOTE — H&P PST ADULT - HISTORY OF PRESENT ILLNESS
'CHANGING THE DEFIBRILLATOR BATTERY"   ALSO PT AND WIFE STATES SAMIR HE IS TO HAVE SQUAMOUS SELL CA REMOVED FROM LEFT UPPER LIP LATER THIS MONTH  PT CURRENTLY DENIES CHEST PAIN, PALPITATIONS, DYSURIA, RECENT ILLNESS  EXERCISE TOLERANCE CAN ONE MILE   PT DENIES ANY RASHES, ABRASION, OR OPEN WOUNDS OR CUTS 'CHANGING THE DEFIBRILLATOR BATTERY"   ALSO PT AND WIFE STATES SAMIR HE IS TO HAVE SQUAMOUS SELL CA REMOVED FROM LEFT UPPER LIP LATER THIS MONTH  PT CURRENTLY DENIES CHEST PAIN, PALPITATIONS, DYSURIA, RECENT ILLNESS  EXERCISE TOLERANCE CAN ONE MILE   PT DENIES ANY RASHES, ABRASION, OR OPEN WOUNDS OR CUTS  AS PER THE PT AND HIS WIFE, THIS IS A COMPLETE MEDICAL AND SURGICAL HX, INCLUDING MEDICATIONS PRESCRIBED AND OVER THE COUNTER

## 2018-08-09 NOTE — H&P PST ADULT - PRIMARY CARE PROVIDER
xuan mckinney---rom covering    cardio  warchol   mary beth rousseau xuan mckinney---rom covering    cardio  warchol   ep bekheit   pulm  kilkenny

## 2018-08-14 ENCOUNTER — APPOINTMENT (OUTPATIENT)
Dept: PLASTIC SURGERY | Facility: CLINIC | Age: 78
End: 2018-08-14
Payer: COMMERCIAL

## 2018-08-14 ENCOUNTER — OUTPATIENT (OUTPATIENT)
Dept: OUTPATIENT SERVICES | Facility: HOSPITAL | Age: 78
LOS: 1 days | Discharge: HOME | End: 2018-08-14

## 2018-08-14 DIAGNOSIS — Z79.01 LONG TERM (CURRENT) USE OF ANTICOAGULANTS: ICD-10-CM

## 2018-08-14 DIAGNOSIS — S68.119A COMPLETE TRAUMATIC METACARPOPHALANGEAL AMPUTATION OF UNSPECIFIED FINGER, INITIAL ENCOUNTER: Chronic | ICD-10-CM

## 2018-08-14 DIAGNOSIS — K40.90 UNILATERAL INGUINAL HERNIA, WITHOUT OBSTRUCTION OR GANGRENE, NOT SPECIFIED AS RECURRENT: Chronic | ICD-10-CM

## 2018-08-14 DIAGNOSIS — Z95.1 PRESENCE OF AORTOCORONARY BYPASS GRAFT: Chronic | ICD-10-CM

## 2018-08-14 DIAGNOSIS — Z98.890 OTHER SPECIFIED POSTPROCEDURAL STATES: Chronic | ICD-10-CM

## 2018-08-14 DIAGNOSIS — Z95.2 PRESENCE OF PROSTHETIC HEART VALVE: ICD-10-CM

## 2018-08-14 DIAGNOSIS — C44.92 SQUAMOUS CELL CARCINOMA OF SKIN, UNSPECIFIED: ICD-10-CM

## 2018-08-14 PROCEDURE — 99212 OFFICE O/P EST SF 10 MIN: CPT

## 2018-08-15 ENCOUNTER — OUTPATIENT (OUTPATIENT)
Dept: OUTPATIENT SERVICES | Facility: HOSPITAL | Age: 78
LOS: 1 days | Discharge: HOME | End: 2018-08-15

## 2018-08-15 DIAGNOSIS — Z98.890 OTHER SPECIFIED POSTPROCEDURAL STATES: Chronic | ICD-10-CM

## 2018-08-15 DIAGNOSIS — S68.119A COMPLETE TRAUMATIC METACARPOPHALANGEAL AMPUTATION OF UNSPECIFIED FINGER, INITIAL ENCOUNTER: Chronic | ICD-10-CM

## 2018-08-15 DIAGNOSIS — Z95.2 PRESENCE OF PROSTHETIC HEART VALVE: ICD-10-CM

## 2018-08-15 DIAGNOSIS — Z79.01 LONG TERM (CURRENT) USE OF ANTICOAGULANTS: ICD-10-CM

## 2018-08-15 DIAGNOSIS — Z95.1 PRESENCE OF AORTOCORONARY BYPASS GRAFT: Chronic | ICD-10-CM

## 2018-08-15 DIAGNOSIS — K40.90 UNILATERAL INGUINAL HERNIA, WITHOUT OBSTRUCTION OR GANGRENE, NOT SPECIFIED AS RECURRENT: Chronic | ICD-10-CM

## 2018-08-16 ENCOUNTER — OUTPATIENT (OUTPATIENT)
Dept: OUTPATIENT SERVICES | Facility: HOSPITAL | Age: 78
LOS: 1 days | Discharge: HOME | End: 2018-08-16

## 2018-08-16 VITALS — DIASTOLIC BLOOD PRESSURE: 68 MMHG | SYSTOLIC BLOOD PRESSURE: 114 MMHG | HEART RATE: 59 BPM | RESPIRATION RATE: 20 BRPM

## 2018-08-16 VITALS
WEIGHT: 160.06 LBS | RESPIRATION RATE: 18 BRPM | SYSTOLIC BLOOD PRESSURE: 141 MMHG | TEMPERATURE: 98 F | HEIGHT: 65 IN | DIASTOLIC BLOOD PRESSURE: 81 MMHG | HEART RATE: 61 BPM

## 2018-08-16 DIAGNOSIS — Z45.02 ENCOUNTER FOR ADJUSTMENT AND MANAGEMENT OF AUTOMATIC IMPLANTABLE CARDIAC DEFIBRILLATOR: ICD-10-CM

## 2018-08-16 DIAGNOSIS — Z98.890 OTHER SPECIFIED POSTPROCEDURAL STATES: Chronic | ICD-10-CM

## 2018-08-16 DIAGNOSIS — Z87.891 PERSONAL HISTORY OF NICOTINE DEPENDENCE: ICD-10-CM

## 2018-08-16 DIAGNOSIS — E78.4 OTHER HYPERLIPIDEMIA: ICD-10-CM

## 2018-08-16 DIAGNOSIS — S68.119A COMPLETE TRAUMATIC METACARPOPHALANGEAL AMPUTATION OF UNSPECIFIED FINGER, INITIAL ENCOUNTER: Chronic | ICD-10-CM

## 2018-08-16 DIAGNOSIS — Z95.1 PRESENCE OF AORTOCORONARY BYPASS GRAFT: Chronic | ICD-10-CM

## 2018-08-16 DIAGNOSIS — K40.90 UNILATERAL INGUINAL HERNIA, WITHOUT OBSTRUCTION OR GANGRENE, NOT SPECIFIED AS RECURRENT: Chronic | ICD-10-CM

## 2018-08-16 DIAGNOSIS — I48.91 UNSPECIFIED ATRIAL FIBRILLATION: ICD-10-CM

## 2018-08-16 DIAGNOSIS — Z79.01 LONG TERM (CURRENT) USE OF ANTICOAGULANTS: ICD-10-CM

## 2018-08-16 RX ORDER — SODIUM CHLORIDE 9 MG/ML
1000 INJECTION INTRAMUSCULAR; INTRAVENOUS; SUBCUTANEOUS
Qty: 0 | Refills: 0 | Status: DISCONTINUED | OUTPATIENT
Start: 2018-08-16 | End: 2018-08-31

## 2018-08-16 RX ORDER — ACETAMINOPHEN 500 MG
2 TABLET ORAL
Qty: 60 | Refills: 0 | OUTPATIENT
Start: 2018-08-16 | End: 2018-08-20

## 2018-08-16 RX ORDER — MORPHINE SULFATE 50 MG/1
2 CAPSULE, EXTENDED RELEASE ORAL
Qty: 0 | Refills: 0 | Status: DISCONTINUED | OUTPATIENT
Start: 2018-08-16 | End: 2018-08-16

## 2018-08-16 RX ORDER — ONDANSETRON 8 MG/1
4 TABLET, FILM COATED ORAL ONCE
Qty: 0 | Refills: 0 | Status: DISCONTINUED | OUTPATIENT
Start: 2018-08-16 | End: 2018-08-16

## 2018-08-16 RX ADMIN — SODIUM CHLORIDE 30 MILLILITER(S): 9 INJECTION INTRAMUSCULAR; INTRAVENOUS; SUBCUTANEOUS at 16:30

## 2018-08-16 NOTE — BRIEF OPERATIVE NOTE - PRE-OP DX
ICD (implantable cardioverter-defibrillator) battery depletion  08/16/2018    Active  Jordon Salazar

## 2018-08-16 NOTE — ASU PATIENT PROFILE, ADULT - ANESTHESIA, PREVIOUS REACTION, PROFILE
WITH GALL BLADDER/respiratory complications delayed awakening/WITH GALL BLADDER/respiratory complications

## 2018-08-16 NOTE — ASU DISCHARGE PLAN (ADULT/PEDIATRIC). - SPECIAL INSTRUCTIONS
Please resume Coumadin this evening.  leave Clear dressing in place  Do not lift arm over head  Call office for increase in swelling, pain or redness around incision.  Call office for fever greater than 101  Call for any questions or concerns.  No driving till seen by Dr. jeter

## 2018-08-16 NOTE — BRIEF OPERATIVE NOTE - ASSISTANT(S)
NO
Late Note Performed upon receipt of pt  60 year old male in no acute distress, alert and oriented x 4, c/o sob  x 2 weeks and worsening due to ascites. Pt has history of same and reports requiring paracentesis approx every 2 weeks. Pt reports no changes to history or meds since last visit.

## 2018-08-16 NOTE — ASU DISCHARGE PLAN (ADULT/PEDIATRIC). - MEDICATION SUMMARY - MEDICATIONS TO TAKE
I will START or STAY ON the medications listed below when I get home from the hospital:    Tylenol 325 mg oral tablet  -- 2 tab(s) by mouth every 4 hours, As Needed -for moderate pain MDD:8 tablets   -- This product contains acetaminophen.  Do not use  with any other product containing acetaminophen to prevent possible liver damage.    -- Indication: For as needed for pain    amiodarone 200 mg oral tablet  -- 1 tab(s) by mouth once a day  -- Indication: For arrhythmia    Coumadin 7.5 mg oral tablet  -- 1 tab(s) by mouth once a day  -- Indication: For a fib    simvastatin 40 mg oral tablet  -- 1 tab(s) by mouth once a day (at bedtime)  -- Indication: For high choleterol    Symbicort 80 mcg-4.5 mcg/inh inhalation aerosol  -- 2 puff(s) inhaled 2 times a day, As Needed  -- Indication: For help breathing    Incruse Ellipta 62.5 mcg/inh inhalation powder  -- 1 puff(s) inhaled once a day  -- Indication: For help breathing    Lasix 40 mg oral tablet  -- 1 tab(s) by mouth once a day  -- Indication: For water pill    dicyclomine 20 mg oral tablet  -- 1 tab(s) by mouth once a day  -- Indication: For irritable bowel

## 2018-08-16 NOTE — PRE-ANESTHESIA EVALUATION ADULT - NSRADCARDRESULTSFT_GEN_ALL_CORE
Ventricular Rate 61 BPM    Atrial Rate 61 BPM    QRS Duration 170 ms    Q-T Interval 516 ms    QTC Calculation(Bezet) 519 ms    R Axis 86 degrees    T Axis -69 degrees    Diagnosis Line Normal sinus rhythm with 1st degree A-V block  Left bundle branch block  Abnormal ECG

## 2018-08-16 NOTE — ASU DISCHARGE PLAN (ADULT/PEDIATRIC). - NOTIFY
Swelling that continues/Numbness, color, or temperature change to extremity/Pain not relieved by Medications/Persistent Nausea and Vomiting/Bleeding that does not stop/Fever greater than 101/Unable to Urinate

## 2018-08-16 NOTE — ASU PATIENT PROFILE, ADULT - PSH
Amputation finger  LEFT 4TH SECONDARY TO BASAL CELL  H/O squamous cell carcinoma excision  BELOW LEFT EYE  H/O surgery to heart and great vessels, presenting hazards to health  AVR (2015 / MVR (1995)  H/O surgery to major organs, presenting hazards to health  Cholecystectomy 2017  H/O surgery to major organs, presenting hazards to health  ICD Implant 2012  Right inguinal hernia  2006  S/P CABG x 1

## 2018-08-16 NOTE — BRIEF OPERATIVE NOTE - OPERATION/FINDINGS
1. GOOD LEAD PROPERTIES  2 POCKET REVISION INDICATED DUE TO THREATENED EROSION OF THE SKIN, DEVICE PLACED IN SUB-MUSCULAT POCKET

## 2018-08-16 NOTE — BRIEF OPERATIVE NOTE - PROCEDURE
<<-----Click on this checkbox to enter Procedure Replacement of implantable cardioverter-defibrillator (ICD) battery  08/16/2018    Active  FROSELL

## 2018-08-22 ENCOUNTER — OUTPATIENT (OUTPATIENT)
Dept: OUTPATIENT SERVICES | Facility: HOSPITAL | Age: 78
LOS: 1 days | Discharge: HOME | End: 2018-08-22

## 2018-08-22 DIAGNOSIS — K40.90 UNILATERAL INGUINAL HERNIA, WITHOUT OBSTRUCTION OR GANGRENE, NOT SPECIFIED AS RECURRENT: Chronic | ICD-10-CM

## 2018-08-22 DIAGNOSIS — Z98.890 OTHER SPECIFIED POSTPROCEDURAL STATES: Chronic | ICD-10-CM

## 2018-08-22 DIAGNOSIS — S68.119A COMPLETE TRAUMATIC METACARPOPHALANGEAL AMPUTATION OF UNSPECIFIED FINGER, INITIAL ENCOUNTER: Chronic | ICD-10-CM

## 2018-08-22 DIAGNOSIS — Z95.1 PRESENCE OF AORTOCORONARY BYPASS GRAFT: Chronic | ICD-10-CM

## 2018-08-22 DIAGNOSIS — Z95.2 PRESENCE OF PROSTHETIC HEART VALVE: ICD-10-CM

## 2018-08-22 DIAGNOSIS — Z79.01 LONG TERM (CURRENT) USE OF ANTICOAGULANTS: ICD-10-CM

## 2018-08-22 LAB
POCT INR: 3.7 RATIO — HIGH (ref 0.9–1.2)
POCT PT: 44.2 SEC — HIGH (ref 10–13.4)

## 2018-08-29 ENCOUNTER — OUTPATIENT (OUTPATIENT)
Dept: OUTPATIENT SERVICES | Facility: HOSPITAL | Age: 78
LOS: 1 days | Discharge: HOME | End: 2018-08-29

## 2018-08-29 DIAGNOSIS — Z95.2 PRESENCE OF PROSTHETIC HEART VALVE: ICD-10-CM

## 2018-08-29 DIAGNOSIS — Z98.890 OTHER SPECIFIED POSTPROCEDURAL STATES: Chronic | ICD-10-CM

## 2018-08-29 DIAGNOSIS — Z79.01 LONG TERM (CURRENT) USE OF ANTICOAGULANTS: ICD-10-CM

## 2018-08-29 DIAGNOSIS — Z95.1 PRESENCE OF AORTOCORONARY BYPASS GRAFT: Chronic | ICD-10-CM

## 2018-08-29 DIAGNOSIS — S68.119A COMPLETE TRAUMATIC METACARPOPHALANGEAL AMPUTATION OF UNSPECIFIED FINGER, INITIAL ENCOUNTER: Chronic | ICD-10-CM

## 2018-08-29 DIAGNOSIS — K40.90 UNILATERAL INGUINAL HERNIA, WITHOUT OBSTRUCTION OR GANGRENE, NOT SPECIFIED AS RECURRENT: Chronic | ICD-10-CM

## 2018-08-29 LAB
POCT INR: 2.4 RATIO — HIGH (ref 0.9–1.2)
POCT PT: 29.1 SEC — HIGH (ref 10–13.4)

## 2018-09-05 ENCOUNTER — OUTPATIENT (OUTPATIENT)
Dept: OUTPATIENT SERVICES | Facility: HOSPITAL | Age: 78
LOS: 1 days | Discharge: HOME | End: 2018-09-05

## 2018-09-05 DIAGNOSIS — S68.119A COMPLETE TRAUMATIC METACARPOPHALANGEAL AMPUTATION OF UNSPECIFIED FINGER, INITIAL ENCOUNTER: Chronic | ICD-10-CM

## 2018-09-05 DIAGNOSIS — Z95.2 PRESENCE OF PROSTHETIC HEART VALVE: ICD-10-CM

## 2018-09-05 DIAGNOSIS — Z98.890 OTHER SPECIFIED POSTPROCEDURAL STATES: Chronic | ICD-10-CM

## 2018-09-05 DIAGNOSIS — Z95.1 PRESENCE OF AORTOCORONARY BYPASS GRAFT: Chronic | ICD-10-CM

## 2018-09-05 DIAGNOSIS — K40.90 UNILATERAL INGUINAL HERNIA, WITHOUT OBSTRUCTION OR GANGRENE, NOT SPECIFIED AS RECURRENT: Chronic | ICD-10-CM

## 2018-09-05 DIAGNOSIS — Z79.01 LONG TERM (CURRENT) USE OF ANTICOAGULANTS: ICD-10-CM

## 2018-09-05 LAB
POCT INR: 2.8 RATIO — HIGH (ref 0.9–1.2)
POCT PT: 33.3 SEC — HIGH (ref 10–13.4)

## 2018-09-07 ENCOUNTER — APPOINTMENT (OUTPATIENT)
Dept: PLASTIC SURGERY | Facility: CLINIC | Age: 78
End: 2018-09-07
Payer: COMMERCIAL

## 2018-09-07 PROCEDURE — 14060 TIS TRNFR E/N/E/L 10 SQ CM/<: CPT

## 2018-09-07 RX ORDER — CLINDAMYCIN HYDROCHLORIDE 300 MG/1
300 CAPSULE ORAL EVERY 6 HOURS
Qty: 8 | Refills: 0 | Status: ACTIVE | COMMUNITY
Start: 2018-09-07 | End: 1900-01-01

## 2018-09-12 ENCOUNTER — EMERGENCY (EMERGENCY)
Facility: HOSPITAL | Age: 78
LOS: 0 days | Discharge: HOME | End: 2018-09-12
Attending: EMERGENCY MEDICINE | Admitting: EMERGENCY MEDICINE

## 2018-09-12 VITALS
TEMPERATURE: 97 F | HEART RATE: 70 BPM | HEIGHT: 65 IN | RESPIRATION RATE: 16 BRPM | WEIGHT: 160.06 LBS | DIASTOLIC BLOOD PRESSURE: 69 MMHG | SYSTOLIC BLOOD PRESSURE: 131 MMHG | OXYGEN SATURATION: 97 %

## 2018-09-12 DIAGNOSIS — K40.90 UNILATERAL INGUINAL HERNIA, WITHOUT OBSTRUCTION OR GANGRENE, NOT SPECIFIED AS RECURRENT: Chronic | ICD-10-CM

## 2018-09-12 DIAGNOSIS — L76.22 POSTPROCEDURAL HEMORRHAGE OF SKIN AND SUBCUTANEOUS TISSUE FOLLOWING OTHER PROCEDURE: ICD-10-CM

## 2018-09-12 DIAGNOSIS — Z95.810 PRESENCE OF AUTOMATIC (IMPLANTABLE) CARDIAC DEFIBRILLATOR: ICD-10-CM

## 2018-09-12 DIAGNOSIS — S68.119A COMPLETE TRAUMATIC METACARPOPHALANGEAL AMPUTATION OF UNSPECIFIED FINGER, INITIAL ENCOUNTER: Chronic | ICD-10-CM

## 2018-09-12 DIAGNOSIS — Z98.890 OTHER SPECIFIED POSTPROCEDURAL STATES: Chronic | ICD-10-CM

## 2018-09-12 DIAGNOSIS — Z79.01 LONG TERM (CURRENT) USE OF ANTICOAGULANTS: ICD-10-CM

## 2018-09-12 DIAGNOSIS — I48.91 UNSPECIFIED ATRIAL FIBRILLATION: ICD-10-CM

## 2018-09-12 DIAGNOSIS — I25.10 ATHEROSCLEROTIC HEART DISEASE OF NATIVE CORONARY ARTERY WITHOUT ANGINA PECTORIS: ICD-10-CM

## 2018-09-12 DIAGNOSIS — Z90.49 ACQUIRED ABSENCE OF OTHER SPECIFIED PARTS OF DIGESTIVE TRACT: ICD-10-CM

## 2018-09-12 DIAGNOSIS — J44.9 CHRONIC OBSTRUCTIVE PULMONARY DISEASE, UNSPECIFIED: ICD-10-CM

## 2018-09-12 DIAGNOSIS — Z87.891 PERSONAL HISTORY OF NICOTINE DEPENDENCE: ICD-10-CM

## 2018-09-12 DIAGNOSIS — I25.2 OLD MYOCARDIAL INFARCTION: ICD-10-CM

## 2018-09-12 DIAGNOSIS — E78.5 HYPERLIPIDEMIA, UNSPECIFIED: ICD-10-CM

## 2018-09-12 DIAGNOSIS — Z95.1 PRESENCE OF AORTOCORONARY BYPASS GRAFT: Chronic | ICD-10-CM

## 2018-09-12 DIAGNOSIS — I50.22 CHRONIC SYSTOLIC (CONGESTIVE) HEART FAILURE: ICD-10-CM

## 2018-09-12 DIAGNOSIS — E78.00 PURE HYPERCHOLESTEROLEMIA, UNSPECIFIED: ICD-10-CM

## 2018-09-12 LAB
APTT BLD: 45.7 SEC — HIGH (ref 27–39.2)
BASOPHILS # BLD AUTO: 0.03 K/UL — SIGNIFICANT CHANGE UP (ref 0–0.2)
BASOPHILS NFR BLD AUTO: 0.4 % — SIGNIFICANT CHANGE UP (ref 0–1)
EOSINOPHIL # BLD AUTO: 0.07 K/UL — SIGNIFICANT CHANGE UP (ref 0–0.7)
EOSINOPHIL NFR BLD AUTO: 0.9 % — SIGNIFICANT CHANGE UP (ref 0–8)
HCT VFR BLD CALC: 43.4 % — SIGNIFICANT CHANGE UP (ref 42–52)
HGB BLD-MCNC: 14.3 G/DL — SIGNIFICANT CHANGE UP (ref 14–18)
IMM GRANULOCYTES NFR BLD AUTO: 0.4 % — HIGH (ref 0.1–0.3)
INR BLD: 3.08 RATIO — HIGH (ref 0.65–1.3)
LYMPHOCYTES # BLD AUTO: 1.29 K/UL — SIGNIFICANT CHANGE UP (ref 1.2–3.4)
LYMPHOCYTES # BLD AUTO: 16 % — LOW (ref 20.5–51.1)
MCHC RBC-ENTMCNC: 28.6 PG — SIGNIFICANT CHANGE UP (ref 27–31)
MCHC RBC-ENTMCNC: 32.9 G/DL — SIGNIFICANT CHANGE UP (ref 32–37)
MCV RBC AUTO: 86.8 FL — SIGNIFICANT CHANGE UP (ref 80–94)
MONOCYTES # BLD AUTO: 1.04 K/UL — HIGH (ref 0.1–0.6)
MONOCYTES NFR BLD AUTO: 12.9 % — HIGH (ref 1.7–9.3)
NEUTROPHILS # BLD AUTO: 5.62 K/UL — SIGNIFICANT CHANGE UP (ref 1.4–6.5)
NEUTROPHILS NFR BLD AUTO: 69.4 % — SIGNIFICANT CHANGE UP (ref 42.2–75.2)
NRBC # BLD: 0 /100 WBCS — SIGNIFICANT CHANGE UP (ref 0–0)
PLATELET # BLD AUTO: 205 K/UL — SIGNIFICANT CHANGE UP (ref 130–400)
PROTHROM AB SERPL-ACNC: 32.9 SEC — HIGH (ref 9.95–12.87)
RBC # BLD: 5 M/UL — SIGNIFICANT CHANGE UP (ref 4.7–6.1)
RBC # FLD: 12.9 % — SIGNIFICANT CHANGE UP (ref 11.5–14.5)
WBC # BLD: 8.08 K/UL — SIGNIFICANT CHANGE UP (ref 4.8–10.8)
WBC # FLD AUTO: 8.08 K/UL — SIGNIFICANT CHANGE UP (ref 4.8–10.8)

## 2018-09-12 RX ORDER — TRANEXAMIC ACID 100 MG/ML
1 INJECTION, SOLUTION INTRAVENOUS ONCE
Qty: 0 | Refills: 0 | Status: COMPLETED | OUTPATIENT
Start: 2018-09-12 | End: 2018-09-12

## 2018-09-12 RX ADMIN — TRANEXAMIC ACID 1 APPLICATION(S): 100 INJECTION, SOLUTION INTRAVENOUS at 05:45

## 2018-09-12 RX ADMIN — TRANEXAMIC ACID 1 APPLICATION(S): 100 INJECTION, SOLUTION INTRAVENOUS at 06:00

## 2018-09-12 NOTE — ED PROVIDER NOTE - PHYSICAL EXAMINATION
VITAL SIGNS: I have reviewed nursing notes and confirm.  CONSTITUTIONAL: Well-developed; well-nourished; in no acute distress.  SKIN: Skin exam is warm and dry, no acute rash.  HEAD: Normocephalic; atraumatic.  EYES: Conjunctiva and sclera clear.  ENT: No nasal discharge; airway clear. (+) wound to left lateral aspect of left upper lip, mild bleeding from oral mucosa.   NECK: Supple; non tender.  CARD: S1, S2 normal; no murmurs, gallops, or rubs. Regular rate and rhythm.  RESP: No wheezes, rales or rhonchi. Speaking in full sentences.   EXT: Normal ROM. No clubbing, cyanosis or edema.  NEURO: Alert, oriented. Grossly unremarkable. No focal deficits.

## 2018-09-12 NOTE — ED PROVIDER NOTE - OBJECTIVE STATEMENT
76 yo M with aFib on Coumadin, CAD, COPD on 2L x 5 days, AICD, and arthritis presents to the ED c/o bleeding from left inner upper lip wound. Symptoms started suddenly around 2 AM tonight and have been persisting. Pt tried applying pressure without relief of symptoms. Pt had MOHS procedure on Thursday for squamous/basal cell carcinoma with reconstruction by Dr. Linda on Friday. Pt did not hold coumadin prior to procedure. Pt denies other complaints. Pt denies fever, chills, weakness, chest pain, SOB. 76 yo M with aFib on Coumadin, CAD, COPD, AICD, and arthritis presents to the ED c/o bleeding from left inner upper lip wound. Symptoms started suddenly around 2 AM tonight and have been persisting. Pt tried applying pressure without relief of symptoms. Pt had MOHS procedure on Thursday for squamous/basal cell carcinoma with reconstruction by Dr. Linda on Friday. Pt did not hold coumadin prior to procedure. Pt denies other complaints. Pt denies fever, chills, weakness, chest pain, SOB.

## 2018-09-12 NOTE — ED PROVIDER NOTE - DATE/TIME 4
12-Sep-2018 07:52 Complex Repair And Z Plasty Text: The defect edges were debeveled with a #15 scalpel blade.  The primary defect was closed partially with a complex linear closure.  Given the location of the remaining defect, shape of the defect and the proximity to free margins a Z plasty was deemed most appropriate for complete closure of the defect.  Using a sterile surgical marker, an appropriate advancement flap was drawn incorporating the defect and placing the expected incisions within the relaxed skin tension lines where possible.    The area thus outlined was incised deep to adipose tissue with a #15 scalpel blade.  The skin margins were undermined to an appropriate distance in all directions utilizing iris scissors.

## 2018-09-12 NOTE — ED ADULT TRIAGE NOTE - CHIEF COMPLAINT QUOTE
He has lip CA, he had the MOHS procedure on Thursday, then the next day plastic surgery to close him up, he's on Coumadin and he started bleeding yesterday at 2 AM - wife

## 2018-09-12 NOTE — ED PROVIDER NOTE - PROGRESS NOTE DETAILS
Discussed case with Dr. Linda, states he or someone from his team will come evaluate pt in ED. endorsed to DR Oconnell, pending surgery eval and dispo Pt seen and evaluated by plastic surgery ANA M Merino, pt to be observed in ED for a few more hours, if no bleeding can be d/c'd- advised to do cold compresses and to follow-up with Dr. Linda as scheduled. I received signout pending Dr. Linda's representative to evaluate and discuss case with Dr. Linda. Continue observation and direct pressure in ED  -D/C home once stable x several hours  Plastics evaluated pt in ED,, recommends to observe pt, if bleeding does not start up again, will dc home.. cold compresses to area at home and F/u in office tomorrow Pt no longer bleeding, will dc home

## 2018-09-12 NOTE — ED PROVIDER NOTE - ATTENDING CONTRIBUTION TO CARE
78 yo male with pMH of afib, valve replaced, high chol, cholecystectomy, who presents to the ER for bleeding from wound site. Pt has MOHS on thursday per DR Linda and then closure the following day. Did not hold coumadin. NOw overnight bleeding from inner upper left lip/cheek area. Applied pressure at home with no relief. IN ER applied TXA soaked gauze and lido with epi with directly to bleeding site. Site packed with more guaze with slowing down of bleeding. PT hemodynamically stable. Agree with PA management. Check labs, consult Alexei, reassess.

## 2018-09-12 NOTE — ED ADULT NURSE NOTE - OBJECTIVE STATEMENT
Patient c/o of bleeding from left side of mouth. Patient states he had a RONALD procedure for skin cancer. States he woke up at 0200 with blood and swelling from the procedure site spreading to his chin. denies pain, SOB.

## 2018-09-12 NOTE — CONSULT NOTE ADULT - SUBJECTIVE AND OBJECTIVE BOX
YONATHAN FAGAN  [id number]    Subjective:  Patient is a 77y old  M with extensive PMH including Afib on Coumadin and COPD on 2L O2 who presented to ED c/o spontaneous bleeding to left cheek/nasolabial incision starting at 2am today, that did not stop with direct pressure. Pt was treated in ED with topical coagulant with subsequent stabilization of bleeding. Pt now c/o discomfort and significant swelling but denies difficulty breathing or speaking or inability to move face.    Objective:  T(C): 36 (09-12-18 @ 05:02), Max: 36 (09-12-18 @ 05:02)  HR: 70 (09-12-18 @ 05:02) (70 - 70)  BP: 131/69 (09-12-18 @ 05:02) (131/69 - 131/69)  RR: 16 (09-12-18 @ 05:02) (16 - 16)  SpO2: 97% (09-12-18 @ 05:02) (97% - 97%)  Wt(kg): --                             14.3   8.08  )-----------( 205      ( 12 Sep 2018 05:00 )             43.4       PHYSICAL EXAM:    General: AAOx3, NAD, breathing comfortably, speaking in complete sentences  Head: significant ecchymoses/swelling involving left cheek, left upper lip, and BL chin/neck; FROM at neck, facial animation intact, sensory exam normal  Skin: Left nasolabial incision with dry sanguineous drainage, closure and sutures intact, +local induration and likely small stable hematoma, not yet liquified         Assessment/Plan:  Patient is a 77y old M with left cheek spontaneous bleeding since 2AM today s/p reconstruction of Mohs defect 9/7/18 in the setting of Coumadin not being held  -Continue observation and direct pressure in ED  -D/C home once stable x several hours  -Cold compresses to area at home  -F/u in office tomorrow  -The above was discussed with attending Dr. Linda

## 2018-09-13 ENCOUNTER — OUTPATIENT (OUTPATIENT)
Dept: OUTPATIENT SERVICES | Facility: HOSPITAL | Age: 78
LOS: 1 days | Discharge: HOME | End: 2018-09-13

## 2018-09-13 ENCOUNTER — APPOINTMENT (OUTPATIENT)
Dept: PLASTIC SURGERY | Facility: CLINIC | Age: 78
End: 2018-09-13
Payer: COMMERCIAL

## 2018-09-13 DIAGNOSIS — Z79.01 LONG TERM (CURRENT) USE OF ANTICOAGULANTS: ICD-10-CM

## 2018-09-13 DIAGNOSIS — Z98.890 OTHER SPECIFIED POSTPROCEDURAL STATES: Chronic | ICD-10-CM

## 2018-09-13 DIAGNOSIS — Z95.2 PRESENCE OF PROSTHETIC HEART VALVE: ICD-10-CM

## 2018-09-13 DIAGNOSIS — Z95.1 PRESENCE OF AORTOCORONARY BYPASS GRAFT: Chronic | ICD-10-CM

## 2018-09-13 DIAGNOSIS — S68.119A COMPLETE TRAUMATIC METACARPOPHALANGEAL AMPUTATION OF UNSPECIFIED FINGER, INITIAL ENCOUNTER: Chronic | ICD-10-CM

## 2018-09-13 DIAGNOSIS — K40.90 UNILATERAL INGUINAL HERNIA, WITHOUT OBSTRUCTION OR GANGRENE, NOT SPECIFIED AS RECURRENT: Chronic | ICD-10-CM

## 2018-09-13 LAB
POCT INR: 2.6 RATIO — HIGH (ref 0.9–1.2)
POCT PT: 31.3 SEC — HIGH (ref 10–13.4)

## 2018-09-13 PROCEDURE — 99024 POSTOP FOLLOW-UP VISIT: CPT

## 2018-09-18 ENCOUNTER — APPOINTMENT (OUTPATIENT)
Dept: PLASTIC SURGERY | Facility: CLINIC | Age: 78
End: 2018-09-18
Payer: COMMERCIAL

## 2018-09-18 PROCEDURE — 99024 POSTOP FOLLOW-UP VISIT: CPT

## 2018-09-21 ENCOUNTER — OUTPATIENT (OUTPATIENT)
Dept: OUTPATIENT SERVICES | Facility: HOSPITAL | Age: 78
LOS: 1 days | Discharge: HOME | End: 2018-09-21

## 2018-09-21 DIAGNOSIS — Z98.890 OTHER SPECIFIED POSTPROCEDURAL STATES: Chronic | ICD-10-CM

## 2018-09-21 DIAGNOSIS — Z95.1 PRESENCE OF AORTOCORONARY BYPASS GRAFT: Chronic | ICD-10-CM

## 2018-09-21 DIAGNOSIS — S68.119A COMPLETE TRAUMATIC METACARPOPHALANGEAL AMPUTATION OF UNSPECIFIED FINGER, INITIAL ENCOUNTER: Chronic | ICD-10-CM

## 2018-09-21 DIAGNOSIS — Z95.2 PRESENCE OF PROSTHETIC HEART VALVE: ICD-10-CM

## 2018-09-21 DIAGNOSIS — Z79.01 LONG TERM (CURRENT) USE OF ANTICOAGULANTS: ICD-10-CM

## 2018-09-21 DIAGNOSIS — K40.90 UNILATERAL INGUINAL HERNIA, WITHOUT OBSTRUCTION OR GANGRENE, NOT SPECIFIED AS RECURRENT: Chronic | ICD-10-CM

## 2018-09-21 LAB
POCT INR: 2.4 RATIO — HIGH (ref 0.9–1.2)
POCT PT: 29.2 SEC — HIGH (ref 10–13.4)

## 2018-10-02 ENCOUNTER — APPOINTMENT (OUTPATIENT)
Dept: PLASTIC SURGERY | Facility: CLINIC | Age: 78
End: 2018-10-02
Payer: COMMERCIAL

## 2018-10-02 PROCEDURE — 99024 POSTOP FOLLOW-UP VISIT: CPT

## 2018-10-19 ENCOUNTER — OUTPATIENT (OUTPATIENT)
Dept: OUTPATIENT SERVICES | Facility: HOSPITAL | Age: 78
LOS: 1 days | Discharge: HOME | End: 2018-10-19

## 2018-10-19 DIAGNOSIS — S68.119A COMPLETE TRAUMATIC METACARPOPHALANGEAL AMPUTATION OF UNSPECIFIED FINGER, INITIAL ENCOUNTER: Chronic | ICD-10-CM

## 2018-10-19 DIAGNOSIS — Z98.890 OTHER SPECIFIED POSTPROCEDURAL STATES: Chronic | ICD-10-CM

## 2018-10-19 DIAGNOSIS — Z95.2 PRESENCE OF PROSTHETIC HEART VALVE: ICD-10-CM

## 2018-10-19 DIAGNOSIS — K40.90 UNILATERAL INGUINAL HERNIA, WITHOUT OBSTRUCTION OR GANGRENE, NOT SPECIFIED AS RECURRENT: Chronic | ICD-10-CM

## 2018-10-19 DIAGNOSIS — Z79.01 LONG TERM (CURRENT) USE OF ANTICOAGULANTS: ICD-10-CM

## 2018-10-19 DIAGNOSIS — Z95.1 PRESENCE OF AORTOCORONARY BYPASS GRAFT: Chronic | ICD-10-CM

## 2018-10-19 LAB
POCT INR: 4.9 RATIO — HIGH (ref 0.9–1.2)
POCT PT: 59.2 SEC — HIGH (ref 10–13.4)

## 2018-10-31 ENCOUNTER — OUTPATIENT (OUTPATIENT)
Dept: OUTPATIENT SERVICES | Facility: HOSPITAL | Age: 78
LOS: 1 days | Discharge: HOME | End: 2018-10-31

## 2018-10-31 DIAGNOSIS — Z95.1 PRESENCE OF AORTOCORONARY BYPASS GRAFT: Chronic | ICD-10-CM

## 2018-10-31 DIAGNOSIS — Z95.2 PRESENCE OF PROSTHETIC HEART VALVE: ICD-10-CM

## 2018-10-31 DIAGNOSIS — Z98.890 OTHER SPECIFIED POSTPROCEDURAL STATES: Chronic | ICD-10-CM

## 2018-10-31 DIAGNOSIS — K40.90 UNILATERAL INGUINAL HERNIA, WITHOUT OBSTRUCTION OR GANGRENE, NOT SPECIFIED AS RECURRENT: Chronic | ICD-10-CM

## 2018-10-31 DIAGNOSIS — Z79.01 LONG TERM (CURRENT) USE OF ANTICOAGULANTS: ICD-10-CM

## 2018-10-31 DIAGNOSIS — S68.119A COMPLETE TRAUMATIC METACARPOPHALANGEAL AMPUTATION OF UNSPECIFIED FINGER, INITIAL ENCOUNTER: Chronic | ICD-10-CM

## 2018-11-28 ENCOUNTER — OUTPATIENT (OUTPATIENT)
Dept: OUTPATIENT SERVICES | Facility: HOSPITAL | Age: 78
LOS: 1 days | Discharge: HOME | End: 2018-11-28

## 2018-11-28 DIAGNOSIS — Z95.2 PRESENCE OF PROSTHETIC HEART VALVE: ICD-10-CM

## 2018-11-28 DIAGNOSIS — Z98.890 OTHER SPECIFIED POSTPROCEDURAL STATES: Chronic | ICD-10-CM

## 2018-11-28 DIAGNOSIS — Z95.1 PRESENCE OF AORTOCORONARY BYPASS GRAFT: Chronic | ICD-10-CM

## 2018-11-28 DIAGNOSIS — Z79.01 LONG TERM (CURRENT) USE OF ANTICOAGULANTS: ICD-10-CM

## 2018-11-28 DIAGNOSIS — K40.90 UNILATERAL INGUINAL HERNIA, WITHOUT OBSTRUCTION OR GANGRENE, NOT SPECIFIED AS RECURRENT: Chronic | ICD-10-CM

## 2018-11-28 DIAGNOSIS — S68.119A COMPLETE TRAUMATIC METACARPOPHALANGEAL AMPUTATION OF UNSPECIFIED FINGER, INITIAL ENCOUNTER: Chronic | ICD-10-CM

## 2018-11-28 LAB
POCT INR: 3.1 RATIO — HIGH (ref 0.9–1.2)
POCT PT: 37.7 SEC — HIGH (ref 10–13.4)

## 2018-12-28 ENCOUNTER — OUTPATIENT (OUTPATIENT)
Dept: OUTPATIENT SERVICES | Facility: HOSPITAL | Age: 78
LOS: 1 days | Discharge: HOME | End: 2018-12-28

## 2018-12-28 DIAGNOSIS — Z95.1 PRESENCE OF AORTOCORONARY BYPASS GRAFT: Chronic | ICD-10-CM

## 2018-12-28 DIAGNOSIS — Z98.890 OTHER SPECIFIED POSTPROCEDURAL STATES: Chronic | ICD-10-CM

## 2018-12-28 DIAGNOSIS — K40.90 UNILATERAL INGUINAL HERNIA, WITHOUT OBSTRUCTION OR GANGRENE, NOT SPECIFIED AS RECURRENT: Chronic | ICD-10-CM

## 2018-12-28 DIAGNOSIS — Z79.01 LONG TERM (CURRENT) USE OF ANTICOAGULANTS: ICD-10-CM

## 2018-12-28 DIAGNOSIS — Z95.2 PRESENCE OF PROSTHETIC HEART VALVE: ICD-10-CM

## 2018-12-28 DIAGNOSIS — S68.119A COMPLETE TRAUMATIC METACARPOPHALANGEAL AMPUTATION OF UNSPECIFIED FINGER, INITIAL ENCOUNTER: Chronic | ICD-10-CM

## 2018-12-28 LAB
POCT INR: 4.3 RATIO — HIGH (ref 0.9–1.2)
POCT PT: 51 SEC — HIGH (ref 10–13.4)

## 2019-01-08 ENCOUNTER — APPOINTMENT (OUTPATIENT)
Dept: PLASTIC SURGERY | Facility: CLINIC | Age: 79
End: 2019-01-08
Payer: COMMERCIAL

## 2019-01-08 DIAGNOSIS — K13.0 DISEASES OF LIPS: ICD-10-CM

## 2019-01-08 PROCEDURE — 99212 OFFICE O/P EST SF 10 MIN: CPT

## 2019-01-08 NOTE — ASSESSMENT
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 Patel\par -Sun protection

## 2019-01-08 NOTE — PHYSICAL EXAM
[de-identified] : well-appearing, NAD [de-identified] : Left nasolabial and cheek incisions very well-healed, mild induration to NL fold only, no residual swelling or ecchymoses, facial animation intact, +diminished sensation along NL scar

## 2019-01-08 NOTE — HISTORY OF PRESENT ILLNESS
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 Also hx with BCC of left upper lip, biopsy done 6/28/18 by Dr. Patel and underwent Moh's procedure with Dr. Moctezuma with subsequent wound closure in the office on 9/7/18. Patient reported to [facility] 9/12/18 for swelling and hematoma, INR 3.5. Resolved since. Presents today for follow up. Feeling very well. States bruising has completely resolved. C/o decreased sensation to scar along nasolabial fold only.

## 2019-01-09 ENCOUNTER — OUTPATIENT (OUTPATIENT)
Dept: OUTPATIENT SERVICES | Facility: HOSPITAL | Age: 79
LOS: 1 days | Discharge: HOME | End: 2019-01-09

## 2019-01-09 DIAGNOSIS — Z98.890 OTHER SPECIFIED POSTPROCEDURAL STATES: Chronic | ICD-10-CM

## 2019-01-09 DIAGNOSIS — Z95.1 PRESENCE OF AORTOCORONARY BYPASS GRAFT: Chronic | ICD-10-CM

## 2019-01-09 DIAGNOSIS — K40.90 UNILATERAL INGUINAL HERNIA, WITHOUT OBSTRUCTION OR GANGRENE, NOT SPECIFIED AS RECURRENT: Chronic | ICD-10-CM

## 2019-01-09 DIAGNOSIS — S68.119A COMPLETE TRAUMATIC METACARPOPHALANGEAL AMPUTATION OF UNSPECIFIED FINGER, INITIAL ENCOUNTER: Chronic | ICD-10-CM

## 2019-01-09 DIAGNOSIS — Z79.899 OTHER LONG TERM (CURRENT) DRUG THERAPY: ICD-10-CM

## 2019-01-09 DIAGNOSIS — I25.10 ATHEROSCLEROTIC HEART DISEASE OF NATIVE CORONARY ARTERY WITHOUT ANGINA PECTORIS: ICD-10-CM

## 2019-01-09 DIAGNOSIS — Z01.810 ENCOUNTER FOR PREPROCEDURAL CARDIOVASCULAR EXAMINATION: ICD-10-CM

## 2019-01-09 DIAGNOSIS — E78.00 PURE HYPERCHOLESTEROLEMIA, UNSPECIFIED: ICD-10-CM

## 2019-01-11 ENCOUNTER — OUTPATIENT (OUTPATIENT)
Dept: OUTPATIENT SERVICES | Facility: HOSPITAL | Age: 79
LOS: 1 days | Discharge: HOME | End: 2019-01-11

## 2019-01-11 DIAGNOSIS — Z98.890 OTHER SPECIFIED POSTPROCEDURAL STATES: Chronic | ICD-10-CM

## 2019-01-11 DIAGNOSIS — K40.90 UNILATERAL INGUINAL HERNIA, WITHOUT OBSTRUCTION OR GANGRENE, NOT SPECIFIED AS RECURRENT: Chronic | ICD-10-CM

## 2019-01-11 DIAGNOSIS — Z95.2 PRESENCE OF PROSTHETIC HEART VALVE: ICD-10-CM

## 2019-01-11 DIAGNOSIS — S68.119A COMPLETE TRAUMATIC METACARPOPHALANGEAL AMPUTATION OF UNSPECIFIED FINGER, INITIAL ENCOUNTER: Chronic | ICD-10-CM

## 2019-01-11 DIAGNOSIS — Z95.1 PRESENCE OF AORTOCORONARY BYPASS GRAFT: Chronic | ICD-10-CM

## 2019-01-11 DIAGNOSIS — Z79.01 LONG TERM (CURRENT) USE OF ANTICOAGULANTS: ICD-10-CM

## 2019-01-11 LAB
POCT INR: 5 RATIO — HIGH (ref 0.9–1.2)
POCT PT: 60 SEC — HIGH (ref 10–13.4)

## 2019-01-18 ENCOUNTER — OUTPATIENT (OUTPATIENT)
Dept: OUTPATIENT SERVICES | Facility: HOSPITAL | Age: 79
LOS: 1 days | Discharge: HOME | End: 2019-01-18

## 2019-01-18 DIAGNOSIS — Z95.2 PRESENCE OF PROSTHETIC HEART VALVE: ICD-10-CM

## 2019-01-18 DIAGNOSIS — S68.119A COMPLETE TRAUMATIC METACARPOPHALANGEAL AMPUTATION OF UNSPECIFIED FINGER, INITIAL ENCOUNTER: Chronic | ICD-10-CM

## 2019-01-18 DIAGNOSIS — K40.90 UNILATERAL INGUINAL HERNIA, WITHOUT OBSTRUCTION OR GANGRENE, NOT SPECIFIED AS RECURRENT: Chronic | ICD-10-CM

## 2019-01-18 DIAGNOSIS — Z79.01 LONG TERM (CURRENT) USE OF ANTICOAGULANTS: ICD-10-CM

## 2019-01-18 DIAGNOSIS — Z98.890 OTHER SPECIFIED POSTPROCEDURAL STATES: Chronic | ICD-10-CM

## 2019-01-18 DIAGNOSIS — Z95.1 PRESENCE OF AORTOCORONARY BYPASS GRAFT: Chronic | ICD-10-CM

## 2019-01-18 LAB
POCT INR: 4.4 RATIO — HIGH (ref 0.9–1.2)
POCT PT: 52.7 SEC — HIGH (ref 10–13.4)

## 2019-01-25 ENCOUNTER — OUTPATIENT (OUTPATIENT)
Dept: OUTPATIENT SERVICES | Facility: HOSPITAL | Age: 79
LOS: 1 days | Discharge: HOME | End: 2019-01-25

## 2019-01-25 DIAGNOSIS — Z98.890 OTHER SPECIFIED POSTPROCEDURAL STATES: Chronic | ICD-10-CM

## 2019-01-25 DIAGNOSIS — K40.90 UNILATERAL INGUINAL HERNIA, WITHOUT OBSTRUCTION OR GANGRENE, NOT SPECIFIED AS RECURRENT: Chronic | ICD-10-CM

## 2019-01-25 DIAGNOSIS — Z95.1 PRESENCE OF AORTOCORONARY BYPASS GRAFT: Chronic | ICD-10-CM

## 2019-01-25 DIAGNOSIS — Z79.01 LONG TERM (CURRENT) USE OF ANTICOAGULANTS: ICD-10-CM

## 2019-01-25 DIAGNOSIS — S68.119A COMPLETE TRAUMATIC METACARPOPHALANGEAL AMPUTATION OF UNSPECIFIED FINGER, INITIAL ENCOUNTER: Chronic | ICD-10-CM

## 2019-01-25 DIAGNOSIS — Z95.2 PRESENCE OF PROSTHETIC HEART VALVE: ICD-10-CM

## 2019-01-25 LAB
POCT INR: 4.1 RATIO — HIGH (ref 0.9–1.2)
POCT PT: 49.2 SEC — HIGH (ref 10–13.4)

## 2019-01-28 ENCOUNTER — OUTPATIENT (OUTPATIENT)
Dept: OUTPATIENT SERVICES | Facility: HOSPITAL | Age: 79
LOS: 1 days | Discharge: HOME | End: 2019-01-28

## 2019-01-28 DIAGNOSIS — Z98.890 OTHER SPECIFIED POSTPROCEDURAL STATES: Chronic | ICD-10-CM

## 2019-01-28 DIAGNOSIS — Z79.01 LONG TERM (CURRENT) USE OF ANTICOAGULANTS: ICD-10-CM

## 2019-01-28 DIAGNOSIS — K40.90 UNILATERAL INGUINAL HERNIA, WITHOUT OBSTRUCTION OR GANGRENE, NOT SPECIFIED AS RECURRENT: Chronic | ICD-10-CM

## 2019-01-28 DIAGNOSIS — S68.119A COMPLETE TRAUMATIC METACARPOPHALANGEAL AMPUTATION OF UNSPECIFIED FINGER, INITIAL ENCOUNTER: Chronic | ICD-10-CM

## 2019-01-28 DIAGNOSIS — Z95.1 PRESENCE OF AORTOCORONARY BYPASS GRAFT: Chronic | ICD-10-CM

## 2019-01-28 DIAGNOSIS — Z95.2 PRESENCE OF PROSTHETIC HEART VALVE: ICD-10-CM

## 2019-01-28 LAB
POCT INR: 2.4 RATIO — HIGH (ref 0.9–1.2)
POCT PT: 29.2 SEC — HIGH (ref 10–13.4)

## 2019-02-04 ENCOUNTER — OUTPATIENT (OUTPATIENT)
Dept: OUTPATIENT SERVICES | Facility: HOSPITAL | Age: 79
LOS: 1 days | Discharge: HOME | End: 2019-02-04

## 2019-02-04 DIAGNOSIS — Z98.890 OTHER SPECIFIED POSTPROCEDURAL STATES: Chronic | ICD-10-CM

## 2019-02-04 DIAGNOSIS — S68.119A COMPLETE TRAUMATIC METACARPOPHALANGEAL AMPUTATION OF UNSPECIFIED FINGER, INITIAL ENCOUNTER: Chronic | ICD-10-CM

## 2019-02-04 DIAGNOSIS — Z79.01 LONG TERM (CURRENT) USE OF ANTICOAGULANTS: ICD-10-CM

## 2019-02-04 DIAGNOSIS — Z95.2 PRESENCE OF PROSTHETIC HEART VALVE: ICD-10-CM

## 2019-02-04 DIAGNOSIS — Z95.1 PRESENCE OF AORTOCORONARY BYPASS GRAFT: Chronic | ICD-10-CM

## 2019-02-04 DIAGNOSIS — K40.90 UNILATERAL INGUINAL HERNIA, WITHOUT OBSTRUCTION OR GANGRENE, NOT SPECIFIED AS RECURRENT: Chronic | ICD-10-CM

## 2019-02-04 LAB
POCT INR: 3.2 RATIO — HIGH (ref 0.9–1.2)
POCT PT: 38.4 SEC — HIGH (ref 10–13.4)

## 2019-02-15 ENCOUNTER — OUTPATIENT (OUTPATIENT)
Dept: OUTPATIENT SERVICES | Facility: HOSPITAL | Age: 79
LOS: 1 days | Discharge: HOME | End: 2019-02-15

## 2019-02-15 DIAGNOSIS — Z95.1 PRESENCE OF AORTOCORONARY BYPASS GRAFT: Chronic | ICD-10-CM

## 2019-02-15 DIAGNOSIS — Z98.890 OTHER SPECIFIED POSTPROCEDURAL STATES: Chronic | ICD-10-CM

## 2019-02-15 DIAGNOSIS — S68.119A COMPLETE TRAUMATIC METACARPOPHALANGEAL AMPUTATION OF UNSPECIFIED FINGER, INITIAL ENCOUNTER: Chronic | ICD-10-CM

## 2019-02-15 DIAGNOSIS — Z79.01 LONG TERM (CURRENT) USE OF ANTICOAGULANTS: ICD-10-CM

## 2019-02-15 DIAGNOSIS — K40.90 UNILATERAL INGUINAL HERNIA, WITHOUT OBSTRUCTION OR GANGRENE, NOT SPECIFIED AS RECURRENT: Chronic | ICD-10-CM

## 2019-02-15 DIAGNOSIS — Z95.2 PRESENCE OF PROSTHETIC HEART VALVE: ICD-10-CM

## 2019-02-15 LAB
POCT INR: 3.6 RATIO — HIGH (ref 0.9–1.2)
POCT PT: 43.7 SEC — HIGH (ref 10–13.4)

## 2019-02-22 ENCOUNTER — TRANSCRIPTION ENCOUNTER (OUTPATIENT)
Age: 79
End: 2019-02-22

## 2019-02-23 ENCOUNTER — INPATIENT (INPATIENT)
Facility: HOSPITAL | Age: 79
LOS: 3 days | Discharge: HOME | End: 2019-02-27
Attending: INTERNAL MEDICINE | Admitting: INTERNAL MEDICINE

## 2019-02-23 VITALS
DIASTOLIC BLOOD PRESSURE: 85 MMHG | RESPIRATION RATE: 22 BRPM | HEART RATE: 92 BPM | TEMPERATURE: 97 F | OXYGEN SATURATION: 96 % | SYSTOLIC BLOOD PRESSURE: 180 MMHG

## 2019-02-23 DIAGNOSIS — Z98.890 OTHER SPECIFIED POSTPROCEDURAL STATES: Chronic | ICD-10-CM

## 2019-02-23 DIAGNOSIS — S68.119A COMPLETE TRAUMATIC METACARPOPHALANGEAL AMPUTATION OF UNSPECIFIED FINGER, INITIAL ENCOUNTER: Chronic | ICD-10-CM

## 2019-02-23 DIAGNOSIS — Z95.1 PRESENCE OF AORTOCORONARY BYPASS GRAFT: Chronic | ICD-10-CM

## 2019-02-23 DIAGNOSIS — K40.90 UNILATERAL INGUINAL HERNIA, WITHOUT OBSTRUCTION OR GANGRENE, NOT SPECIFIED AS RECURRENT: Chronic | ICD-10-CM

## 2019-02-23 LAB
ALBUMIN SERPL ELPH-MCNC: 4.8 G/DL — SIGNIFICANT CHANGE UP (ref 3.5–5.2)
ALP SERPL-CCNC: 78 U/L — SIGNIFICANT CHANGE UP (ref 30–115)
ALT FLD-CCNC: 39 U/L — SIGNIFICANT CHANGE UP (ref 0–41)
ANION GAP SERPL CALC-SCNC: 14 MMOL/L — SIGNIFICANT CHANGE UP (ref 7–14)
APPEARANCE UR: CLEAR — SIGNIFICANT CHANGE UP
APTT BLD: 40.4 SEC — HIGH (ref 27–39.2)
AST SERPL-CCNC: 42 U/L — HIGH (ref 0–41)
BACTERIA # UR AUTO: ABNORMAL /HPF
BASOPHILS # BLD AUTO: 0.01 K/UL — SIGNIFICANT CHANGE UP (ref 0–0.2)
BASOPHILS NFR BLD AUTO: 0.1 % — SIGNIFICANT CHANGE UP (ref 0–1)
BILIRUB SERPL-MCNC: 0.5 MG/DL — SIGNIFICANT CHANGE UP (ref 0.2–1.2)
BILIRUB UR-MCNC: NEGATIVE — SIGNIFICANT CHANGE UP
BUN SERPL-MCNC: 31 MG/DL — HIGH (ref 10–20)
CALCIUM SERPL-MCNC: 10.3 MG/DL — HIGH (ref 8.5–10.1)
CHLORIDE SERPL-SCNC: 100 MMOL/L — SIGNIFICANT CHANGE UP (ref 98–110)
CO2 SERPL-SCNC: 28 MMOL/L — SIGNIFICANT CHANGE UP (ref 17–32)
COLOR SPEC: YELLOW — SIGNIFICANT CHANGE UP
CREAT SERPL-MCNC: 1.3 MG/DL — SIGNIFICANT CHANGE UP (ref 0.7–1.5)
DIFF PNL FLD: ABNORMAL
EOSINOPHIL # BLD AUTO: 0 K/UL — SIGNIFICANT CHANGE UP (ref 0–0.7)
EOSINOPHIL NFR BLD AUTO: 0 % — SIGNIFICANT CHANGE UP (ref 0–8)
GLUCOSE SERPL-MCNC: 137 MG/DL — HIGH (ref 70–99)
GLUCOSE UR QL: NEGATIVE MG/DL — SIGNIFICANT CHANGE UP
HCT VFR BLD CALC: 51.5 % — SIGNIFICANT CHANGE UP (ref 42–52)
HGB BLD-MCNC: 16.7 G/DL — SIGNIFICANT CHANGE UP (ref 14–18)
IMM GRANULOCYTES NFR BLD AUTO: 0.4 % — HIGH (ref 0.1–0.3)
INR BLD: 2.08 RATIO — HIGH (ref 0.65–1.3)
KETONES UR-MCNC: NEGATIVE — SIGNIFICANT CHANGE UP
LACTATE SERPL-SCNC: 1.9 MMOL/L — SIGNIFICANT CHANGE UP (ref 0.5–2.2)
LEUKOCYTE ESTERASE UR-ACNC: NEGATIVE — SIGNIFICANT CHANGE UP
LIDOCAIN IGE QN: 18 U/L — SIGNIFICANT CHANGE UP (ref 7–60)
LYMPHOCYTES # BLD AUTO: 1 K/UL — LOW (ref 1.2–3.4)
LYMPHOCYTES # BLD AUTO: 7.4 % — LOW (ref 20.5–51.1)
MCHC RBC-ENTMCNC: 28.6 PG — SIGNIFICANT CHANGE UP (ref 27–31)
MCHC RBC-ENTMCNC: 32.4 G/DL — SIGNIFICANT CHANGE UP (ref 32–37)
MCV RBC AUTO: 88.3 FL — SIGNIFICANT CHANGE UP (ref 80–94)
MONOCYTES # BLD AUTO: 0.88 K/UL — HIGH (ref 0.1–0.6)
MONOCYTES NFR BLD AUTO: 6.5 % — SIGNIFICANT CHANGE UP (ref 1.7–9.3)
NEUTROPHILS # BLD AUTO: 11.6 K/UL — HIGH (ref 1.4–6.5)
NEUTROPHILS NFR BLD AUTO: 85.6 % — HIGH (ref 42.2–75.2)
NITRITE UR-MCNC: NEGATIVE — SIGNIFICANT CHANGE UP
NRBC # BLD: 0 /100 WBCS — SIGNIFICANT CHANGE UP (ref 0–0)
PH UR: 6 — SIGNIFICANT CHANGE UP (ref 5–8)
PLATELET # BLD AUTO: 185 K/UL — SIGNIFICANT CHANGE UP (ref 130–400)
POTASSIUM SERPL-MCNC: 4.2 MMOL/L — SIGNIFICANT CHANGE UP (ref 3.5–5)
POTASSIUM SERPL-SCNC: 4.2 MMOL/L — SIGNIFICANT CHANGE UP (ref 3.5–5)
PROT SERPL-MCNC: 8.6 G/DL — HIGH (ref 6–8)
PROT UR-MCNC: ABNORMAL MG/DL
PROTHROM AB SERPL-ACNC: 23.8 SEC — HIGH (ref 9.95–12.87)
RBC # BLD: 5.83 M/UL — SIGNIFICANT CHANGE UP (ref 4.7–6.1)
RBC # FLD: 12.7 % — SIGNIFICANT CHANGE UP (ref 11.5–14.5)
RBC CASTS # UR COMP ASSIST: NEGATIVE — SIGNIFICANT CHANGE UP
SODIUM SERPL-SCNC: 142 MMOL/L — SIGNIFICANT CHANGE UP (ref 135–146)
SP GR SPEC: 1.02 — SIGNIFICANT CHANGE UP (ref 1.01–1.03)
TROPONIN T SERPL-MCNC: 0.01 NG/ML — SIGNIFICANT CHANGE UP
UROBILINOGEN FLD QL: 1 MG/DL (ref 0.2–0.2)
WBC # BLD: 13.54 K/UL — HIGH (ref 4.8–10.8)
WBC # FLD AUTO: 13.54 K/UL — HIGH (ref 4.8–10.8)

## 2019-02-23 RX ORDER — ASPIRIN/CALCIUM CARB/MAGNESIUM 324 MG
364 TABLET ORAL ONCE
Qty: 0 | Refills: 0 | Status: COMPLETED | OUTPATIENT
Start: 2019-02-23 | End: 2019-02-23

## 2019-02-23 RX ORDER — SODIUM CHLORIDE 9 MG/ML
1000 INJECTION INTRAMUSCULAR; INTRAVENOUS; SUBCUTANEOUS ONCE
Qty: 0 | Refills: 0 | Status: DISCONTINUED | OUTPATIENT
Start: 2019-02-23 | End: 2019-02-23

## 2019-02-23 RX ORDER — IPRATROPIUM/ALBUTEROL SULFATE 18-103MCG
3 AEROSOL WITH ADAPTER (GRAM) INHALATION ONCE
Qty: 0 | Refills: 0 | Status: DISCONTINUED | OUTPATIENT
Start: 2019-02-23 | End: 2019-02-23

## 2019-02-23 RX ORDER — DEXAMETHASONE 0.5 MG/5ML
20 ELIXIR ORAL ONCE
Qty: 0 | Refills: 0 | Status: DISCONTINUED | OUTPATIENT
Start: 2019-02-23 | End: 2019-02-23

## 2019-02-23 RX ORDER — IPRATROPIUM/ALBUTEROL SULFATE 18-103MCG
3 AEROSOL WITH ADAPTER (GRAM) INHALATION
Qty: 0 | Refills: 0 | Status: DISCONTINUED | OUTPATIENT
Start: 2019-02-23 | End: 2019-02-23

## 2019-02-23 RX ORDER — FUROSEMIDE 40 MG
40 TABLET ORAL ONCE
Qty: 0 | Refills: 0 | Status: COMPLETED | OUTPATIENT
Start: 2019-02-23 | End: 2019-02-23

## 2019-02-23 RX ADMIN — Medication 364 MILLIGRAM(S): at 18:42

## 2019-02-23 RX ADMIN — Medication 40 MILLIGRAM(S): at 21:38

## 2019-02-23 NOTE — ED PROVIDER NOTE - SECONDARY DIAGNOSIS.
Atrial fibrillation, unspecified type CAD (coronary artery disease) Chronic obstructive pulmonary disease, unspecified COPD type Hyperlipidemia, unspecified hyperlipidemia type ICD (implantable cardioverter-defibrillator) in place MI, old Chronic systolic congestive heart failure

## 2019-02-23 NOTE — H&P ADULT - NSHPLABSRESULTS_GEN_ALL_CORE
16.7   13.54 )-----------( 185      ( 2019 18:31 )             51.5           142  |  100  |  31<H>  ----------------------------<  137<H>  4.2   |  28  |  1.3    Ca    10.3<H>      2019 18:31    TPro  8.6<H>  /  Alb  4.8  /  TBili  0.5  /  DBili  x   /  AST  42<H>  /  ALT  39  /  AlkPhos  78            Urinalysis Basic - ( 2019 22:50 )    Color: Yellow / Appearance: Clear / S.020 / pH: x  Gluc: x / Ketone: Negative  / Bili: Negative / Urobili: 1.0 mg/dL   Blood: x / Protein: Trace mg/dL / Nitrite: Negative   Leuk Esterase: Negative / RBC: Negative / WBC x   Sq Epi: x / Non Sq Epi: x / Bacteria: Few /HPF      PT/INR - ( 2019 19:00 )   PT: 23.80 sec;   INR: 2.08 ratio         PTT - ( 2019 19:00 )  PTT:40.4 sec    Lactate Trend   @ 18:31 Lactate:1.9       CARDIAC MARKERS ( 2019 23:41 )  x     / <0.01 ng/mL / x     / x     / 5.0 ng/mL  CARDIAC MARKERS ( 2019 18:31 )  x     / 0.01 ng/mL / x     / x     / x

## 2019-02-23 NOTE — H&P ADULT - PMH
CAD (coronary artery disease)    Cancer  Basal Cell / Squamous Cell  Chronic obstructive pulmonary disease, unspecified COPD type    Chronic systolic congestive heart failure    ROMERO (dyspnea on exertion)    Former smoker, stopped smoking many years ago    Hyperlipidemia, unspecified hyperlipidemia type    ICD (implantable cardioverter-defibrillator) in place    MI, old  MI / Cardiac Arrest 1995  Osteoarthritis    Other irritable bowel syndrome

## 2019-02-23 NOTE — H&P ADULT - NSHPPHYSICALEXAM_GEN_ALL_CORE
T(F): 98.7 (02-23-19 @ 19:15), Max: 98.7 (02-23-19 @ 19:15)  HR: 112 (02-23-19 @ 21:36) (92 - 112)  BP: 130/76 (02-23-19 @ 21:36) (128/78 - 180/85)  RR: 20 (02-23-19 @ 21:36) (20 - 22)  SpO2: 96% (02-23-19 @ 21:36) (96% - 98%)  PHYSICAL EXAM:  GENERAL: NAD, speaks in full sentences, no signs of respiratory distress  HEAD:  Atraumatic, Normocephalic, scars 2/2 skin lesion removal  EYES: EOMI, PERRLA, conjunctiva clear  NECK: Supple, No JVD  CHEST/LUNG: bilateral crackles; No accessory muscles used  HEART: Regular rhythm with tachycardia; mechanical valve click;   ABDOMEN: Soft, Nontender, Nondistended; Bowel sounds present; No guarding  EXTREMITIES:  2+ Peripheral Pulses, No cyanosis or edema  PSYCH: AAOx3  NEUROLOGY: non-focal

## 2019-02-23 NOTE — ED PROVIDER NOTE - CARE PLAN
Principal Discharge DX:	CHF exacerbation  Secondary Diagnosis:	Atrial fibrillation, unspecified type  Secondary Diagnosis:	CAD (coronary artery disease)  Secondary Diagnosis:	Chronic obstructive pulmonary disease, unspecified COPD type  Secondary Diagnosis:	Chronic systolic congestive heart failure  Secondary Diagnosis:	Hyperlipidemia, unspecified hyperlipidemia type  Secondary Diagnosis:	ICD (implantable cardioverter-defibrillator) in place

## 2019-02-23 NOTE — ED ADULT NURSE NOTE - NSIMPLEMENTINTERV_GEN_ALL_ED
Implemented All Universal Safety Interventions:  [place] to call system. Call bell, personal items and telephone within reach. Instruct patient to call for assistance. Room bathroom lighting operational. Non-slip footwear when patient is off stretcher. Physically safe environment: no spills, clutter or unnecessary equipment. Stretcher in lowest position, wheels locked, appropriate side rails in place.

## 2019-02-23 NOTE — ED PROVIDER NOTE - PHYSICAL EXAMINATION
Constitutional: Well developed, well nourished. NAD.  Head: Normocephalic, atraumatic.  Eyes: PERRL. EOMI.  ENT: No nasal discharge. Mucous membranes moist.  Neck: Supple. Painless ROM.  Cardiovascular: Normal S1, S2. Regular rate and rhythm. + murmur.  Pulmonary: Normal respiratory rate and effort. Bibasilar rales, R>L.  Abdominal: Soft. Nondistended. Nontender. No rebound, guarding, rigidity.  Extremities. Pelvis stable. No lower extremity edema, symmetric calves.  Skin: No rashes, cyanosis. Diaphoretic.  Neuro: AAOx3. No focal neurological deficits.  Psych: Normal mood. Normal affect.

## 2019-02-23 NOTE — ED PROVIDER NOTE - OBJECTIVE STATEMENT
Pt is a 77 y/o male with hx of CHF on lasix. s/p mitral valve and aortic valve replacement, HTN, presents to ED for chest pain, substernal for 2 days, not exertional, mild. + SOB with some diaphoresis today. No fever, cough, abd pain, n/v/d.

## 2019-02-23 NOTE — H&P ADULT - HISTORY OF PRESENT ILLNESS
78Y M with pmh of MI in 1995, systolic CHF s/p AICD, mechanical mitral valve, bioprosthetic aortic valve, melanoma and squamous cell carcinoma s/p resections presents to the ED with SOB and cough for 3 days. As per patient his wife was sick before him and for the past 3 days he noticed that he was getting more sob than usual. He also had associated cough with phlegm production which resolved about 2 days ago. The only associated complaint the patient had was tightness across the abdomen with exertion (walking fast but not while climbing stairs). Patient denies chest pain, nausea, vomiting, diaphoresis, fevers, chills or soar throat. He had an echo done about 2 months ago with his electrophysiologist. In the ED the patient has been tachycardic upto 118 and says that he also has orthopnea. 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 As per patient his wife was sick before him and for the past 3 days he noticed that he was getting more sob than usual. He also had associated cough with phlegm production which resolved about 2 days ago. The only associated complaint the patient had was tightness across the abdomen with exertion (walking fast but not while climbing stairs). Patient denies chest pain, nausea, vomiting, diaphoresis, fevers, chills or soar throat. He had an echo done about 2 months ago with his electrophysiologist. In the ED the patient has been tachycardic upto 118 and says that he also has orthopnea. Patient denies any history of Afib though it has been mentioned on his previous admissions.

## 2019-02-23 NOTE — ED ADULT TRIAGE NOTE - CHIEF COMPLAINT QUOTE
c/o left sided chest pain and left arm pain, sob, diff breathing c/o left sided chest pain and left arm pain, sob, diff breathing, pt has a defibrilator. has a mech valve placed 5 years ago.

## 2019-02-23 NOTE — H&P ADULT - NSHPSOCIALHISTORY_GEN_ALL_CORE
Marital Status:  (x )    (   ) Single    (   )    (  )   Occupation: retired  Lives with: (  ) alone  (  ) children   ( x) spouse   (  ) parents  (  ) other    Substance Use (street drugs): ( x ) never used  (  ) other:  Tobacco Usage:  (   ) never smoked   (quit in 1995 ) former smoker   (   ) current smoker  (     ) pack year  (        ) last cigarette date  Alcohol Usage: 1 glass of wine daily

## 2019-02-23 NOTE — ED PROVIDER NOTE - PROGRESS NOTE DETAILS
ATTENDING NOTE:   79 y/o M with PMH of CAD, CHF, HTN and hyperlipidemia, presenting with CP, SOB and wheezing which began acutely. Denies other SX. Exam with diffused wheezing and crackles to b/l bases. A/P: Likely acute CHF. Plan for supportive care, CXR, labs and reassess.

## 2019-02-23 NOTE — H&P ADULT - ASSESSMENT
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    SOB likely 2/2 viral illness causing tachycardia vs chf exacerbation  -Admit to telemetry  -CE x2 are negative  -Will start on metoprolol 25mg q12h for tachycardia   -cxr looks clear  -NO lower extremity edema and patient does not look fluid overloaded so continue with lasix 40mg po  -check 2d echo  -Electrophysiology consult for AICD interrogation   -Cardiology consult for chf exacerbation   -elevated BNP but could be patient's baseline due to SCHF  -hold dicyclomine as could be contributing to abdominal distension    Mechanical valve  -Patient take coumadin 7.5 on Monday and half the tablet on rest of the days   -INR is subtherapeutic so will give 7.5mg tonight  -Keep INR between 2.5-3.5    DLD  -c/w statin    COPD  -c/w symbicort   -hold off on inhalation therapy due to tachycardia     DVT ppx  -c/w coumadin    Dispo: Patient is DNR but is ok with intubation as long as it is not long term. 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    SOB likely 2/2 viral illness causing tachycardia vs chf exacerbation  -Admit to telemetry  -CE x2 are negative  -Will start on metoprolol 25mg q12h for tachycardia   -cxr looks clear  -NO lower extremity edema and patient does not look fluid overloaded so continue with lasix 40mg po  -check 2d echo  -Electrophysiology consult for AICD interrogation   -Cardiology consult for chf exacerbation   -elevated BNP but could be patient's baseline due to SCHF  -hold dicyclomine as could be contributing to abdominal distension  -since pt was subtherapeutic INR will get lower extremity duplex for dvt and if positive should evaluate for PE.     Mechanical valve  -Patient take coumadin 7.5 on Monday and half the tablet on rest of the days   -INR is subtherapeutic so will give 7.5mg tonight  -Keep INR between 2.5-3.5    DLD  -c/w statin    COPD  -c/w symbicort   -hold off on inhalation therapy due to tachycardia     DVT ppx  -c/w coumadin    Dispo: Patient is DNR but is ok with intubation as long as it is not long term. From home

## 2019-02-23 NOTE — ED ADULT NURSE NOTE - CHIEF COMPLAINT QUOTE
c/o left sided chest pain and left arm pain, sob, diff breathing, pt has a defibrilator. has a mech valve placed 5 years ago.

## 2019-02-23 NOTE — H&P ADULT - ATTENDING COMMENTS
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 As per patient his wife was sick before him and for the past 3 days he noticed that he was getting more sob than usual. He also had associated cough with phlegm production which resolved about 2 days ago. The only associated complaint the patient had was tightness across the abdomen with exertion (walking fast but not while climbing stairs). Patient denies chest pain, nausea, vomiting, diaphoresis, fevers, chills or soar throat. He had an echo done about 2 months ago with his electrophysiologist. In the ED the patient has been tachycardic upto 118 and says that he also has orthopnea. Patient denies any history of Afib though it has been mentioned on his previous admissions.     REVIEW OF SYSTEMS: see cc/HPI  CONSTITUTIONAL: No weakness, fevers or chills  EYES/ENT: No visual changes;  No vertigo or throat pain   NECK: No pain or stiffness  RESPIRATORY: (+) cough, wheezing, hemoptysis; (+) shortness of breath  CARDIOVASCULAR: No chest pain or palpitations  GASTROINTESTINAL: No abdominal or epigastric pain. No nausea, vomiting, or hematemesis; No diarrhea or constipation. No melena or hematochezia.  GENITOURINARY: No dysuria, frequency or hematuria  NEUROLOGICAL: No numbness or weakness  SKIN: No itching, rashes    Physical Exam:  General: WN/WD NAD  Neurology: A&Ox3, nonfocal, follows commands  Eyes: PERRLA/ EOMI  ENT/Neck: Neck supple, trachea midline, No JVD  Respiratory: B/L decreased breath sounds, No wheezing, rales, rhonchi  CV: Normal rate regular rhythm, S1S2, no murmurs, rubs or gallops  Abdominal: Soft, mild upper abd discomfort and subjective distention, +BS,   Extremities: No edema, + peripheral pulses  Skin: No Rashes, Hematoma, Ecchymosis  Incisions:   Tubes:    A/p   Dyspnea ??? etiology r/o ACS vs. HFrEF r/o valvular problem +/- recent URI   -admit to tele  -serial EKGs and Adryan  -2D echo and EP eval for AICD interrogation   -check D-dimer and venous duplex  -adjust coumadin   -add low dose BBlocker and c/w outpatient Rx     VHD / mechanical valve replacement   -adjust INR to goal 2.5 - 3.5    DLD  -c/w current Rx    COPD   -c/w symbicort

## 2019-02-23 NOTE — ED ADULT NURSE NOTE - OBJECTIVE STATEMENT
Chest pressure starting last night then resolved on its own. Awoke this morning feeling better. chest pressure came back this evening while the patient was walking. presents to ED diaphoretic, dyspnea with exertion, chest pressure. hypertensive and tachy. Alert and oriented at this time.

## 2019-02-24 LAB
ALBUMIN SERPL ELPH-MCNC: 4.1 G/DL — SIGNIFICANT CHANGE UP (ref 3.5–5.2)
ALP SERPL-CCNC: 65 U/L — SIGNIFICANT CHANGE UP (ref 30–115)
ALT FLD-CCNC: 38 U/L — SIGNIFICANT CHANGE UP (ref 0–41)
ANION GAP SERPL CALC-SCNC: 15 MMOL/L — HIGH (ref 7–14)
ANION GAP SERPL CALC-SCNC: 15 MMOL/L — HIGH (ref 7–14)
AST SERPL-CCNC: 41 U/L — SIGNIFICANT CHANGE UP (ref 0–41)
BASOPHILS # BLD AUTO: 0.01 K/UL — SIGNIFICANT CHANGE UP (ref 0–0.2)
BASOPHILS NFR BLD AUTO: 0.1 % — SIGNIFICANT CHANGE UP (ref 0–1)
BILIRUB SERPL-MCNC: 0.5 MG/DL — SIGNIFICANT CHANGE UP (ref 0.2–1.2)
BUN SERPL-MCNC: 34 MG/DL — HIGH (ref 10–20)
BUN SERPL-MCNC: 34 MG/DL — HIGH (ref 10–20)
CALCIUM SERPL-MCNC: 9.4 MG/DL — SIGNIFICANT CHANGE UP (ref 8.5–10.1)
CALCIUM SERPL-MCNC: 9.4 MG/DL — SIGNIFICANT CHANGE UP (ref 8.5–10.1)
CHLORIDE SERPL-SCNC: 99 MMOL/L — SIGNIFICANT CHANGE UP (ref 98–110)
CHLORIDE SERPL-SCNC: 99 MMOL/L — SIGNIFICANT CHANGE UP (ref 98–110)
CK MB CFR SERPL CALC: 4.8 NG/ML — SIGNIFICANT CHANGE UP (ref 0.6–6.3)
CK MB CFR SERPL CALC: 5 NG/ML — SIGNIFICANT CHANGE UP (ref 0.6–6.3)
CK SERPL-CCNC: 206 U/L — SIGNIFICANT CHANGE UP (ref 0–225)
CK SERPL-CCNC: 276 U/L — HIGH (ref 0–225)
CO2 SERPL-SCNC: 27 MMOL/L — SIGNIFICANT CHANGE UP (ref 17–32)
CO2 SERPL-SCNC: 27 MMOL/L — SIGNIFICANT CHANGE UP (ref 17–32)
CREAT SERPL-MCNC: 1.3 MG/DL — SIGNIFICANT CHANGE UP (ref 0.7–1.5)
CREAT SERPL-MCNC: 1.3 MG/DL — SIGNIFICANT CHANGE UP (ref 0.7–1.5)
D DIMER BLD IA.RAPID-MCNC: 114 NG/ML DDU — SIGNIFICANT CHANGE UP (ref 0–230)
EOSINOPHIL # BLD AUTO: 0 K/UL — SIGNIFICANT CHANGE UP (ref 0–0.7)
EOSINOPHIL NFR BLD AUTO: 0 % — SIGNIFICANT CHANGE UP (ref 0–8)
FLU A RESULT: NEGATIVE — SIGNIFICANT CHANGE UP
FLU A RESULT: NEGATIVE — SIGNIFICANT CHANGE UP
FLUAV AG NPH QL: NEGATIVE — SIGNIFICANT CHANGE UP
FLUBV AG NPH QL: NEGATIVE — SIGNIFICANT CHANGE UP
GLUCOSE SERPL-MCNC: 136 MG/DL — HIGH (ref 70–99)
GLUCOSE SERPL-MCNC: 136 MG/DL — HIGH (ref 70–99)
HCT VFR BLD CALC: 45.1 % — SIGNIFICANT CHANGE UP (ref 42–52)
HGB BLD-MCNC: 14.9 G/DL — SIGNIFICANT CHANGE UP (ref 14–18)
IMM GRANULOCYTES NFR BLD AUTO: 0.3 % — SIGNIFICANT CHANGE UP (ref 0.1–0.3)
LYMPHOCYTES # BLD AUTO: 0.99 K/UL — LOW (ref 1.2–3.4)
LYMPHOCYTES # BLD AUTO: 6.6 % — LOW (ref 20.5–51.1)
MAGNESIUM SERPL-MCNC: 2.2 MG/DL — SIGNIFICANT CHANGE UP (ref 1.8–2.4)
MCHC RBC-ENTMCNC: 29.2 PG — SIGNIFICANT CHANGE UP (ref 27–31)
MCHC RBC-ENTMCNC: 33 G/DL — SIGNIFICANT CHANGE UP (ref 32–37)
MCV RBC AUTO: 88.4 FL — SIGNIFICANT CHANGE UP (ref 80–94)
MONOCYTES # BLD AUTO: 1.42 K/UL — HIGH (ref 0.1–0.6)
MONOCYTES NFR BLD AUTO: 9.5 % — HIGH (ref 1.7–9.3)
NEUTROPHILS # BLD AUTO: 12.55 K/UL — HIGH (ref 1.4–6.5)
NEUTROPHILS NFR BLD AUTO: 83.5 % — HIGH (ref 42.2–75.2)
NRBC # BLD: 0 /100 WBCS — SIGNIFICANT CHANGE UP (ref 0–0)
PLATELET # BLD AUTO: 191 K/UL — SIGNIFICANT CHANGE UP (ref 130–400)
POTASSIUM SERPL-MCNC: 4.1 MMOL/L — SIGNIFICANT CHANGE UP (ref 3.5–5)
POTASSIUM SERPL-MCNC: 4.1 MMOL/L — SIGNIFICANT CHANGE UP (ref 3.5–5)
POTASSIUM SERPL-SCNC: 4.1 MMOL/L — SIGNIFICANT CHANGE UP (ref 3.5–5)
POTASSIUM SERPL-SCNC: 4.1 MMOL/L — SIGNIFICANT CHANGE UP (ref 3.5–5)
PROT SERPL-MCNC: 7.1 G/DL — SIGNIFICANT CHANGE UP (ref 6–8)
RBC # BLD: 5.1 M/UL — SIGNIFICANT CHANGE UP (ref 4.7–6.1)
RBC # FLD: 12.8 % — SIGNIFICANT CHANGE UP (ref 11.5–14.5)
RSV RESULT: NEGATIVE — SIGNIFICANT CHANGE UP
RSV RNA RESP QL NAA+PROBE: NEGATIVE — SIGNIFICANT CHANGE UP
SODIUM SERPL-SCNC: 141 MMOL/L — SIGNIFICANT CHANGE UP (ref 135–146)
SODIUM SERPL-SCNC: 141 MMOL/L — SIGNIFICANT CHANGE UP (ref 135–146)
TROPONIN T SERPL-MCNC: 0.01 NG/ML — SIGNIFICANT CHANGE UP
TROPONIN T SERPL-MCNC: <0.01 NG/ML — SIGNIFICANT CHANGE UP
WBC # BLD: 15.02 K/UL — HIGH (ref 4.8–10.8)
WBC # FLD AUTO: 15.02 K/UL — HIGH (ref 4.8–10.8)

## 2019-02-24 RX ORDER — VANCOMYCIN HCL 1 G
1000 VIAL (EA) INTRAVENOUS EVERY 12 HOURS
Qty: 0 | Refills: 0 | Status: DISCONTINUED | OUTPATIENT
Start: 2019-02-24 | End: 2019-02-25

## 2019-02-24 RX ORDER — FUROSEMIDE 40 MG
40 TABLET ORAL DAILY
Qty: 0 | Refills: 0 | Status: DISCONTINUED | OUTPATIENT
Start: 2019-02-24 | End: 2019-02-24

## 2019-02-24 RX ORDER — AMIODARONE HYDROCHLORIDE 400 MG/1
1 TABLET ORAL
Qty: 900 | Refills: 0 | Status: DISCONTINUED | OUTPATIENT
Start: 2019-02-24 | End: 2019-02-26

## 2019-02-24 RX ORDER — SODIUM CHLORIDE 9 MG/ML
500 INJECTION INTRAMUSCULAR; INTRAVENOUS; SUBCUTANEOUS
Qty: 0 | Refills: 0 | Status: DISCONTINUED | OUTPATIENT
Start: 2019-02-24 | End: 2019-02-27

## 2019-02-24 RX ORDER — WARFARIN SODIUM 2.5 MG/1
7.5 TABLET ORAL ONCE
Qty: 0 | Refills: 0 | Status: COMPLETED | OUTPATIENT
Start: 2019-02-24 | End: 2019-02-24

## 2019-02-24 RX ORDER — AMIODARONE HYDROCHLORIDE 400 MG/1
150 TABLET ORAL ONCE
Qty: 0 | Refills: 0 | Status: COMPLETED | OUTPATIENT
Start: 2019-02-24 | End: 2019-02-24

## 2019-02-24 RX ORDER — VANCOMYCIN HCL 1 G
1000 VIAL (EA) INTRAVENOUS ONCE
Qty: 0 | Refills: 0 | Status: COMPLETED | OUTPATIENT
Start: 2019-02-24 | End: 2019-02-24

## 2019-02-24 RX ORDER — FUROSEMIDE 40 MG
40 TABLET ORAL ONCE
Qty: 0 | Refills: 0 | Status: COMPLETED | OUTPATIENT
Start: 2019-02-24 | End: 2019-02-24

## 2019-02-24 RX ORDER — BUDESONIDE AND FORMOTEROL FUMARATE DIHYDRATE 160; 4.5 UG/1; UG/1
2 AEROSOL RESPIRATORY (INHALATION)
Qty: 0 | Refills: 0 | Status: DISCONTINUED | OUTPATIENT
Start: 2019-02-24 | End: 2019-02-27

## 2019-02-24 RX ORDER — AMIODARONE HYDROCHLORIDE 400 MG/1
200 TABLET ORAL DAILY
Qty: 0 | Refills: 0 | Status: DISCONTINUED | OUTPATIENT
Start: 2019-02-24 | End: 2019-02-24

## 2019-02-24 RX ORDER — SIMVASTATIN 20 MG/1
40 TABLET, FILM COATED ORAL AT BEDTIME
Qty: 0 | Refills: 0 | Status: DISCONTINUED | OUTPATIENT
Start: 2019-02-24 | End: 2019-02-27

## 2019-02-24 RX ORDER — METOPROLOL TARTRATE 50 MG
25 TABLET ORAL
Qty: 0 | Refills: 0 | Status: DISCONTINUED | OUTPATIENT
Start: 2019-02-24 | End: 2019-02-27

## 2019-02-24 RX ORDER — AMIODARONE HYDROCHLORIDE 400 MG/1
0.5 TABLET ORAL
Qty: 900 | Refills: 0 | Status: DISCONTINUED | OUTPATIENT
Start: 2019-02-24 | End: 2019-02-26

## 2019-02-24 RX ORDER — VANCOMYCIN HCL 1 G
VIAL (EA) INTRAVENOUS
Qty: 0 | Refills: 0 | Status: DISCONTINUED | OUTPATIENT
Start: 2019-02-24 | End: 2019-02-25

## 2019-02-24 RX ADMIN — Medication 25 MILLIGRAM(S): at 19:32

## 2019-02-24 RX ADMIN — Medication 250 MILLIGRAM(S): at 19:22

## 2019-02-24 RX ADMIN — Medication 40 MILLIGRAM(S): at 06:08

## 2019-02-24 RX ADMIN — SODIUM CHLORIDE 100 MILLILITER(S): 9 INJECTION INTRAMUSCULAR; INTRAVENOUS; SUBCUTANEOUS at 11:26

## 2019-02-24 RX ADMIN — Medication 40 MILLIGRAM(S): at 17:33

## 2019-02-24 RX ADMIN — Medication 250 MILLIGRAM(S): at 08:40

## 2019-02-24 RX ADMIN — BUDESONIDE AND FORMOTEROL FUMARATE DIHYDRATE 2 PUFF(S): 160; 4.5 AEROSOL RESPIRATORY (INHALATION) at 02:12

## 2019-02-24 RX ADMIN — BUDESONIDE AND FORMOTEROL FUMARATE DIHYDRATE 2 PUFF(S): 160; 4.5 AEROSOL RESPIRATORY (INHALATION) at 19:32

## 2019-02-24 RX ADMIN — WARFARIN SODIUM 7.5 MILLIGRAM(S): 2.5 TABLET ORAL at 02:06

## 2019-02-24 RX ADMIN — AMIODARONE HYDROCHLORIDE 33.33 MG/MIN: 400 TABLET ORAL at 19:00

## 2019-02-24 RX ADMIN — AMIODARONE HYDROCHLORIDE 33.33 MG/MIN: 400 TABLET ORAL at 14:27

## 2019-02-24 RX ADMIN — AMIODARONE HYDROCHLORIDE 16.67 MG/MIN: 400 TABLET ORAL at 20:01

## 2019-02-24 RX ADMIN — SIMVASTATIN 40 MILLIGRAM(S): 20 TABLET, FILM COATED ORAL at 02:06

## 2019-02-24 RX ADMIN — AMIODARONE HYDROCHLORIDE 618 MILLIGRAM(S): 400 TABLET ORAL at 15:45

## 2019-02-24 RX ADMIN — Medication 25 MILLIGRAM(S): at 02:09

## 2019-02-24 RX ADMIN — SIMVASTATIN 40 MILLIGRAM(S): 20 TABLET, FILM COATED ORAL at 23:01

## 2019-02-24 RX ADMIN — AMIODARONE HYDROCHLORIDE 200 MILLIGRAM(S): 400 TABLET ORAL at 02:09

## 2019-02-24 NOTE — ED ADULT NURSE REASSESSMENT NOTE - NS ED NURSE REASSESS COMMENT FT1
dr. starr at bedside with patient preformed bedside ultrasound of heart. Pt brought to CT scan with RN and MD. Pt denies chest pain, sob, dizziness at this time. HR remains 130, Afebrile. IV patent and intact. Cardiac monitored. Safety precautions in place.

## 2019-02-24 NOTE — CONSULT NOTE ADULT - SUBJECTIVE AND OBJECTIVE BOX
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 As per patient his wife was sick before him and for the past 3 days he noticed that he was getting more sob than usual. He also had associated cough with phlegm production which resolved about 2 days ago. The only associated complaint the patient had was tightness across the abdomen with exertion (walking fast but not while climbing stairs). Patient denies chest pain, nausea, vomiting, diaphoresis, fevers, chills or soar throat. He had an echo done about 2 months ago with his electrophysiologist. In the ED the patient has been tachycardic upto 118 and says that he also has orthopnea. Patient denies any history of Afib though it has been mentioned on his previous admissions. (23 Feb 2019 23:51)      ROS:  Constitutional: No fever, chill, sweats  Eye: No recent visual problem  ENMT: No ear pain, nasal congestion, throat pain  Respiratoty: No SOB, cough  Cardiovascular: No chest pain, palpitaion, syncope  Gastrointestinal: No nausea, vomitting, diarhea  Genitourinary: No dysuria, hematuria  Heam/Lymp: No brusing tendency, no swollen glands  Endocrine: Negative for excessive hunger, thirst  Musculoskeletal: No neck pain, back pain, joint pain  Intergumentory: No rash, skin lesions  Neurologic: alert and oriented    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  Right inguinal hernia: 2006  H/O surgery to major organs, presenting hazards to health: Cholecystectomy 2017  H/O surgery to heart and great vessels, presenting hazards to health: AVR (2015 / MVR (1995)  H/O surgery to major organs, presenting hazards to health: ICD Implant 2012      FAMILY HISTORY:  FAMILY HISTORY:  Cancer (Sibling): Brother: Prostate cancer  Family history of heart disease (Sibling): Brother      SOCIAL HISTORY:  Ex smoker    ALLERGIES:  No Known Allergies      MEDICATIONS:  MEDICATIONS  (STANDING):  amiodarone    Tablet 200 milliGRAM(s) Oral daily  buDESOnide  80 MICROgram(s)/formoterol 4.5 MICROgram(s) Inhaler 2 Puff(s) Inhalation two times a day  metoprolol tartrate 25 milliGRAM(s) Oral two times a day  simvastatin 40 milliGRAM(s) Oral at bedtime  sodium chloride 0.9%. 500 milliLiter(s) (100 mL/Hr) IV Continuous <Continuous>  vancomycin  IVPB      vancomycin  IVPB 1000 milliGRAM(s) IV Intermittent every 12 hours    MEDICATIONS  (PRN):      HOME MEDICATIONS:  Home Medications:  amiodarone 200 mg oral tablet: 1 tab(s) orally once a day (24 Feb 2019 00:11)  Coumadin 7.5 mg oral tablet: 1 tab(s) orally once a day (24 Feb 2019 00:11)  dicyclomine 10 mg oral capsule: 1 cap(s) orally once a day (24 Feb 2019 00:11)  Incruse Ellipta 62.5 mcg/inh inhalation powder: 1 puff(s) inhaled once a day (24 Feb 2019 00:11)  Lasix 40 mg oral tablet: 1 tab(s) orally once a day (24 Feb 2019 00:11)  simvastatin 40 mg oral tablet: 1 tab(s) orally once a day (at bedtime) (24 Feb 2019 00:11)  Symbicort 80 mcg-4.5 mcg/inh inhalation aerosol: 2 puff(s) inhaled 2 times a day, As Needed (24 Feb 2019 00:11)      VITALS:   T(F): 97.6 (02-24 @ 07:25), Max: 98.7 (02-23 @ 19:15)  HR: 120 (02-24 @ 07:25) (92 - 131)  BP: 107/60 (02-24 @ 07:25) (106/64 - 180/85)  BP(mean): --  RR: 19 (02-24 @ 07:25) (19 - 22)  SpO2: 97% (02-24 @ 07:25) (96% - 98%)    I&O's Summary      PHYSICAL EXAM:  GEN: Alert and oriented X 3, No acute distress  NECK: Supple, non tender, + JVD, No carotid bruit,   LUNGS: Clear to auscultation bilaterally, non labored respiration  CARDIOVASCULAR: S1/S2 present, tachy , no murmus or rubs,   ABD: Soft, non-tender, non-distended,   EXT: No Lower extremity edema, no tenderness  NEURO: Non focal  SKIN: Intact    LABS:                        14.9   15.02 )-----------( 191      ( 24 Feb 2019 05:46 )             45.1     02-24    141  |  99  |  34<H>  ----------------------------<  136<H>  4.1   |  27  |  1.3    Ca    9.4      24 Feb 2019 05:46  Mg     2.2     02-24    TPro  7.1  /  Alb  4.1  /  TBili  0.5  /  DBili  x   /  AST  41  /  ALT  38  /  AlkPhos  65  02-24    PT/INR - ( 23 Feb 2019 19:00 )   PT: 23.80 sec;   INR: 2.08 ratio         PTT - ( 23 Feb 2019 19:00 )  PTT:40.4 sec  Creatine Kinase, Serum: 206 U/L (02-24-19 @ 05:46)  Troponin T, Serum: 0.01 ng/mL (02-24-19 @ 05:46)  Creatine Kinase, Serum: 276 U/L <H> (02-23-19 @ 23:41)  Troponin T, Serum: <0.01 ng/mL (02-23-19 @ 23:41)  Troponin T, Serum: 0.01 ng/mL (02-23-19 @ 18:31)  Lactate, Blood: 1.9 mmol/L (02-23-19 @ 18:31)    CARDIAC MARKERS ( 24 Feb 2019 05:46 )  x     / 0.01 ng/mL / 206 U/L / x     / 4.8 ng/mL  CARDIAC MARKERS ( 23 Feb 2019 23:41 )  x     / <0.01 ng/mL / 276 U/L / x     / 5.0 ng/mL  CARDIAC MARKERS ( 23 Feb 2019 18:31 )  x     / 0.01 ng/mL / x     / x     / x        Serum Pro-Brain Natriuretic Peptide: 3127 pg/mL (02-23-19 @ 18:31)    RADIOLOGY:    < from: CT Angio Chest Dissection Protocol (02.24.19 @ 06:24) >  IMPRESSION:     Ascending thoracic aortic aneurysm measuring up to 4.8 cm and descending   thoracic aortic aneurysm measuring 3.7 cm, stable.    No evidence for aortic dissection.    Indeterminate right interpolar 1.1 cm renal lesion. Follow-up renal mass   protocol CT or MRI is recommended in 3-6 months.    < end of copied text >      < from: Xray Chest 1 View AP/PA (02.23.19 @ 20:45) >  Impression:      No radiographic evidence of acute cardiopulmonary disease.    < end of copied text >    -TTE:     25-30% EF (11/2018)  Mild mod TR    ECG:  < from: 12 Lead ECG (02.24.19 @ 04:50) >  Diagnosis Line Sinus tachycardia  Possible Left atrial enlargement  Rightward axis  Borderline ECG    < end of copied text >

## 2019-02-24 NOTE — PROVIDER CONTACT NOTE (OTHER) - SITUATION
Patient hr 130 in sinus tachycardia. Pt received 200 mg of po amiodarone and 25 mg of metoprolol at 0230. Pt hr has not changed since medications were given. /76. Denies chest pain, palpations.

## 2019-02-24 NOTE — CONSULT NOTE ADULT - ASSESSMENT
SOB ?   WCT   h/o CAD/MI s/p 1 V CABG '95  ischemic cardiomyopathy s/p DDD ICD '95  h/o rheumatic MV s/p MVR  h/o AS s/p TAVR 2015  PAF     Tele monitoring  Start IV amiodarone,   c/w Abx for now  AICD intergation  EP eval  c/w coumadin, BB  Lasix IV 40 mg once now  will d/w attending

## 2019-02-24 NOTE — ED ADULT NURSE REASSESSMENT NOTE - NS ED NURSE REASSESS COMMENT FT1
Patient moved to ED3 as per charge nurse order . Patient is alert ,on amiodarone infusion . Patient is alert ,oriented .VSS . report given to Lili

## 2019-02-24 NOTE — CHART NOTE - NSCHARTNOTEFT_GEN_A_CORE
Called by nurse to evaluate patient for tachycardia. Pt seen and evaluated at bedside. Pt admitted for dyspnea and pain across epigastrium with unknown source for symptoms.  Upon evaluation patient reports SOB, feeling palpitations, and persistent epigastrium pain. Denies any other symptoms  Vitals 140/80  afebrile.   Physical exam  - Pt laying in bed comfortable, Heart tachycardic with regular rhythm, Lung CTA b/l. No LE edema. Abd soft nontender nondistended    Labs and imaging  - Leukocytosis 15.02 with neutrophil predominant. D-dimer Neg  - Trop neg x3. EKG shows Sinus tach with wide QRS complex 131  - BEDSIDE U/S PERFORMED SHOWS COLLAPSED RV, COLLAPSED IVC, ENLARGED LV AND ENLARGED AORTIC ROOT    A/P  #) Tachycardia unknown source. R/o Aortic Dissection vs dehydration vs sepsis  - Upgrade to telemetry  - CTA dissection protocol was neg  - pt has AICD, and metallic heart valve. Pt meets SIRS criteria with concern of underlying bacteremia  - f/u RVP and Flu given recently ill family member  - Given bedside U/S finding will hold one dose of lasix, and give 5 hours of 100cc/hr  - Please monitor for signs of fluid overload  - Give one dose STAT vanco IV and follow up blood cultures  - Cardiology fellow called, waiting for call back. F/U Cardiology and EPS consult Called by nurse to evaluate patient for tachycardia. Pt seen and evaluated at bedside. Pt admitted for dyspnea and pain across epigastrium with unknown source for symptoms.  Upon evaluation patient reports SOB, feeling palpitations, and persistent epigastrium pain. Denies any other symptoms  Vitals 140/80  afebrile.   Physical exam  - Pt laying in bed comfortable, Heart tachycardic with regular rhythm, Lung CTA b/l. No LE edema. Abd soft nontender nondistended    Labs and imaging  - Leukocytosis 15.02 with neutrophil predominant. D-dimer Neg  - Trop neg x3. EKG shows Sinus tach with wide QRS complex 131  - BEDSIDE U/S PERFORMED SHOWS COLLAPSED RV, COLLAPSED IVC, ENLARGED LV AND ENLARGED AORTIC ROOT    A/P  #) Tachycardia unknown source. R/o Aortic Dissection vs dehydration vs sepsis  - Upgrade to telemetry  - CTA dissection protocol was neg  - pt has AICD, and metallic heart valve. Pt meets SIRS criteria with concern of underlying bacteremia  - f/u RVP and Flu given recently ill family member  - F/U Echo  - Given bedside U/S finding will hold one dose of lasix, and give 5 hours of 100cc/hr  - Please monitor for signs of fluid overload  - Give one dose STAT vanco IV and follow up blood cultures  - Cardiology fellow called, waiting for call back. F/U Cardiology and EPS consult Called by nurse to evaluate patient for tachycardia. Pt seen and evaluated at bedside. Pt admitted for dyspnea and pain across epigastrium with unknown source for symptoms.  Upon evaluation patient reports SOB, feeling palpitations, and persistent epigastrium pain. Denies any other symptoms  Vitals 140/80  afebrile.   Physical exam  - Pt laying in bed comfortable, Heart tachycardic with regular rhythm, Lung CTA b/l. No LE edema. Abd soft nontender nondistended    Labs and imaging  - Leukocytosis 15.02 with neutrophil predominant. D-dimer Neg  - Trop neg x3. EKG shows Sinus tach with wide QRS complex 131  - BEDSIDE U/S PERFORMED SHOWS COLLAPSED RV, COLLAPSED IVC, ENLARGED LV AND ENLARGED AORTIC ROOT    A/P  #) Tachycardia unknown source. R/o Aortic Dissection vs dehydration vs sepsis  - Upgrade to telemetry  - CTA dissection protocol was neg  - pt has AICD, and metallic heart valve. Pt meets SIRS criteria with concern of underlying bacteremia  - f/u RVP and Flu given recently ill family member  - F/U Echo  - Given bedside U/S finding will hold one dose of lasix, and give 5 hours of 100cc/hr  - Please monitor for signs of fluid overload  - Give one dose STAT vanco IV and follow up blood cultures  - Cardiology fellow contacted agrees with plan. F/U Cardiology and EPS consult Called by nurse to evaluate patient for tachycardia. Pt seen and evaluated at bedside. Pt admitted for dyspnea and pain across epigastrium with unknown source for symptoms.  Upon evaluation patient reports SOB, feeling palpitations, and persistent epigastrium pain. Denies any other symptoms  Vitals 140/80  afebrile.   Physical exam  - Pt laying in bed comfortable, Heart tachycardic with regular rhythm, Lung CTA b/l. No LE edema. Abd soft nontender nondistended    Labs and imaging  - Leukocytosis 15.02 with neutrophil predominant. D-dimer Neg  - Trop neg x3. EKG shows Sinus tach with wide QRS complex 131  - BEDSIDE U/S PERFORMED SHOWS COLLAPSED RV, COLLAPSED IVC, ENLARGED LV AND ENLARGED AORTIC ROOT    A/P  #) Tachycardia unknown source. R/o Aortic Dissection vs dehydration vs sepsis  - Upgrade to telemetry  - CTA dissection protocol was neg. CT shows no signs of Pulm vascular congestion or Pulm edema  - pt has AICD, and metallic heart valve. Pt meets SIRS criteria with concern of underlying bacteremia  - f/u RVP and Flu given recently ill family member  - F/U Echo  - Given bedside U/S finding will hold one dose of lasix, and give 5 hours of 100cc/hr then reeval tachycardia and fluid status  - Please monitor for signs of fluid overload  - Give one dose STAT vanco IV and follow up blood cultures and MRSA swab  - Cardiology fellow contacted agrees with plan and recommended one dose of Metoprolol IV if BP can tolerate. BP at reeval 105/60, will hold for now  - F/U Cardiology and EPS consult

## 2019-02-24 NOTE — CONSULT NOTE ADULT - ATTENDING COMMENTS
Patient seen, case d/w with cardiology fellow.  A-flutter with 2:1 block, RVR today.  Agree with Amiodarone IV for 24h, continue Coumadin.  IV lasix today, tomorrow, then reevaluate.  Keep on telemetry. Patient seen, case d/w with cardiology fellow.  Wide-complex rhythm today.  Agree with Amiodarone IV for 24h, continue Coumadin.  ICD interrogation.  IV lasix today, tomorrow, then reevaluate.  Keep on telemetry.

## 2019-02-25 LAB
APTT BLD: 50.5 SEC — HIGH (ref 27–39.2)
CK MB CFR SERPL CALC: 2.9 NG/ML — SIGNIFICANT CHANGE UP (ref 0.6–6.3)
CK SERPL-CCNC: 99 U/L — SIGNIFICANT CHANGE UP (ref 0–225)
GLUCOSE BLDC GLUCOMTR-MCNC: 162 MG/DL — HIGH (ref 70–99)
INR BLD: 4.13 RATIO — HIGH (ref 0.65–1.3)
PROTHROM AB SERPL-ACNC: >40 SEC — HIGH (ref 9.95–12.87)
RAPID RVP RESULT: DETECTED
RV+EV RNA SPEC QL NAA+PROBE: DETECTED
TROPONIN T SERPL-MCNC: <0.01 NG/ML — SIGNIFICANT CHANGE UP

## 2019-02-25 RX ORDER — LIDOCAINE HCL 20 MG/ML
50 VIAL (ML) INJECTION ONCE
Qty: 0 | Refills: 0 | Status: DISCONTINUED | OUTPATIENT
Start: 2019-02-25 | End: 2019-02-25

## 2019-02-25 RX ORDER — LIDOCAINE HCL 20 MG/ML
1 VIAL (ML) INJECTION
Qty: 2 | Refills: 0 | Status: DISCONTINUED | OUTPATIENT
Start: 2019-02-25 | End: 2019-02-26

## 2019-02-25 RX ORDER — FUROSEMIDE 40 MG
40 TABLET ORAL ONCE
Qty: 0 | Refills: 0 | Status: COMPLETED | OUTPATIENT
Start: 2019-02-25 | End: 2019-02-25

## 2019-02-25 RX ORDER — IPRATROPIUM BROMIDE 0.2 MG/ML
500 SOLUTION, NON-ORAL INHALATION EVERY 6 HOURS
Qty: 0 | Refills: 0 | Status: DISCONTINUED | OUTPATIENT
Start: 2019-02-25 | End: 2019-02-26

## 2019-02-25 RX ORDER — CHLORHEXIDINE GLUCONATE 213 G/1000ML
1 SOLUTION TOPICAL
Qty: 0 | Refills: 0 | Status: DISCONTINUED | OUTPATIENT
Start: 2019-02-25 | End: 2019-02-27

## 2019-02-25 RX ORDER — LIDOCAINE HCL 20 MG/ML
50 VIAL (ML) INJECTION ONCE
Qty: 0 | Refills: 0 | Status: COMPLETED | OUTPATIENT
Start: 2019-02-25 | End: 2019-02-25

## 2019-02-25 RX ORDER — INFLUENZA VIRUS VACCINE 15; 15; 15; 15 UG/.5ML; UG/.5ML; UG/.5ML; UG/.5ML
0.5 SUSPENSION INTRAMUSCULAR ONCE
Qty: 0 | Refills: 0 | Status: COMPLETED | OUTPATIENT
Start: 2019-02-25 | End: 2019-02-25

## 2019-02-25 RX ORDER — AMIODARONE HYDROCHLORIDE 400 MG/1
400 TABLET ORAL
Qty: 0 | Refills: 0 | Status: DISCONTINUED | OUTPATIENT
Start: 2019-02-25 | End: 2019-02-26

## 2019-02-25 RX ORDER — FUROSEMIDE 40 MG
40 TABLET ORAL EVERY 12 HOURS
Qty: 0 | Refills: 0 | Status: DISCONTINUED | OUTPATIENT
Start: 2019-02-25 | End: 2019-02-25

## 2019-02-25 RX ADMIN — Medication 7.5 MG/MIN: at 11:55

## 2019-02-25 RX ADMIN — Medication 60 MILLIGRAM(S): at 17:51

## 2019-02-25 RX ADMIN — Medication 25 MILLIGRAM(S): at 17:52

## 2019-02-25 RX ADMIN — Medication 40 MILLIGRAM(S): at 09:11

## 2019-02-25 RX ADMIN — Medication 50 MILLIGRAM(S): at 11:55

## 2019-02-25 RX ADMIN — Medication 7.5 MG/MIN: at 14:00

## 2019-02-25 RX ADMIN — Medication 7.5 MG/MIN: at 12:00

## 2019-02-25 RX ADMIN — AMIODARONE HYDROCHLORIDE 16.67 MG/MIN: 400 TABLET ORAL at 13:00

## 2019-02-25 RX ADMIN — SIMVASTATIN 40 MILLIGRAM(S): 20 TABLET, FILM COATED ORAL at 23:01

## 2019-02-25 RX ADMIN — Medication 500 MICROGRAM(S): at 01:03

## 2019-02-25 RX ADMIN — BUDESONIDE AND FORMOTEROL FUMARATE DIHYDRATE 2 PUFF(S): 160; 4.5 AEROSOL RESPIRATORY (INHALATION) at 09:21

## 2019-02-25 RX ADMIN — AMIODARONE HYDROCHLORIDE 16.67 MG/MIN: 400 TABLET ORAL at 12:00

## 2019-02-25 RX ADMIN — Medication 7.5 MG/MIN: at 22:00

## 2019-02-25 RX ADMIN — Medication 500 MICROGRAM(S): at 09:22

## 2019-02-25 RX ADMIN — AMIODARONE HYDROCHLORIDE 16.67 MG/MIN: 400 TABLET ORAL at 11:32

## 2019-02-25 RX ADMIN — Medication 60 MILLIGRAM(S): at 09:12

## 2019-02-25 RX ADMIN — AMIODARONE HYDROCHLORIDE 400 MILLIGRAM(S): 400 TABLET ORAL at 17:58

## 2019-02-25 RX ADMIN — BUDESONIDE AND FORMOTEROL FUMARATE DIHYDRATE 2 PUFF(S): 160; 4.5 AEROSOL RESPIRATORY (INHALATION) at 20:13

## 2019-02-25 RX ADMIN — Medication 500 MICROGRAM(S): at 17:58

## 2019-02-25 RX ADMIN — Medication 250 MILLIGRAM(S): at 06:34

## 2019-02-25 NOTE — CONSULT NOTE ADULT - ASSESSMENT
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    Impression:   Slow VT  SOB likely due to COPD exacerbation  h/o CAD/MI s/p 1 V CABG   ischemic cardiomyopathy s/p DDD ICD  h/o rheumatic MV s/p MVR mechanical   h/o AS s/p TAVR  h/o PAF       Plan:  device interrogated  cont amiodarone  start IV Lidocaine (50mg bolus followed by 1mg/min infusion)  upgrade to CCU  consider steroids  cont coumadin,. check INR today, target INR 3.0 in view of mechanical MV  cont BB  will follow 78Y M with pmh of MI in 1995, systolic CHF s/p AICD, COPD, mechanical mitral valve, bioprosthetic aortic valve, melanoma and squamous cell carcinoma s/p resections presents to the ED with SOB and cough for 3 days.    Impression:   Slow VT most likely triggered by Albuterol  SOB likely due to COPD exacerbation, severe underlying copd  h/o CAD/MI s/p 1 V CABG   ischemic cardiomyopathy s/p DDD ICD  h/o rheumatic MV s/p MVR mechanical   h/o AS s/p TAVR  h/o PAF       Plan:  device interrogated  cont amiodarone  start IV Lidocaine (50mg bolus followed by 1mg/min infusion)  upgrade to CCU  urgent pulmonary consult  consider steroids  cont coumadin,. check INR today, target INR 3.0 in view of mechanical MV  cont BB  will follow

## 2019-02-25 NOTE — CHART NOTE - NSCHARTNOTEFT_GEN_A_CORE
Upgrade to CCU, approved by Dr. Bae, EPS Upgrade to CCU, approved by Dr. Bae, EPS    78Y M with pmh of MI in , systolic CHF s/p AICD, COPD on home oxygen, mechanical mitral valve, bioprosthetic aortic valve, melanoma and squamous cell carcinoma s/p resections presents to the ED with SOB and cough for 3 days.  In ED, pt had CT chest with IV contrast showing ascending thoracic aortic aneurysm but no aortic dissection.  CXR negative for effusion.  Cardiac enzymes have been negative x 3.  His EKG showed wide complex v tach at HR of 113.  EP was consulted for device interrogation and pt was found to have episodes of Vtach on device.  Pt had RVP performed, negative for influenza but positive for Rhinovirus (enterovirus).      ROS: Admits to shortness of breath, but denies chest pain, palpitations.  Denies being shocked.  Reports having trouble catching his breath      ICU Vital Signs Last 24 Hrs  T(C): 36.6 (2019 11:57), Max: 36.8 (2019 19:10)  T(F): 97.8 (2019 11:57), Max: 98.2 (2019 19:10)  HR: 80 (2019 14:00) (78 - 119)  BP: 100/60 (2019 14:00) (100/60 - 127/70)  BP(mean): --  ABP: --  ABP(mean): --  RR: 17 (2019 14:00) (17 - 20)  SpO2: 98% (2019 14:00) (93% - 100%)      PHYSICAL EXAM:  GENERAL: AAOx3, no acute distress.  HEAD:  Atraumatic, Normocephalic  EYES: EOMI, PERRLA, conjunctiva and sclera clear  NECK: Supple, no JVD, cervical lymphadenopathy or thyromegaly  CHEST/LUNG: b/l wheezing, expiratory  HEART: S1 and S2  ABDOMEN: Soft, nontender, nondistended; Bowel sounds present, no abdominal bruit, masses, or hepatosplenomegaly  EXTREMITIES:  2+ Peripheral Pulses. No clubbing, cyanosis, or edema  PSYCH:  Cooperative and appropriate  NEUROLOGY: No asterixis or tremor. Motor and sensory functions grossly intact                            14.9   15.02 )-----------( 191      ( 2019 05:46 )             45.1           141  |  99  |  34<H>  ----------------------------<  136<H>  4.1   |  27  |  1.3    Ca    9.4      2019 05:46  Mg     2.2         TPro  7.1  /  Alb  4.1  /  TBili  0.5  /  DBili  x   /  AST  41  /  ALT  38  /  AlkPhos  65                Urinalysis Basic - ( 2019 22:50 )    Color: Yellow / Appearance: Clear / S.020 / pH: x  Gluc: x / Ketone: Negative  / Bili: Negative / Urobili: 1.0 mg/dL   Blood: x / Protein: Trace mg/dL / Nitrite: Negative   Leuk Esterase: Negative / RBC: Negative / WBC x   Sq Epi: x / Non Sq Epi: x / Bacteria: Few /HPF        PT/INR - ( 2019 11:47 )   PT: >40.00 sec;   INR: 4.13 ratio         PTT - ( 2019 11:47 )  PTT:50.5 sec    Lactate Trend   @ 18:31 Lactate:1.9       CARDIAC MARKERS ( 2019 11:47 )  x     / <0.01 ng/mL / 99 U/L / x     / 2.9 ng/mL  CARDIAC MARKERS ( 2019 05:46 )  x     / 0.01 ng/mL / 206 U/L / x     / 4.8 ng/mL  CARDIAC MARKERS ( 2019 23:41 )  x     / <0.01 ng/mL / 276 U/L / x     / 5.0 ng/mL  CARDIAC MARKERS ( 2019 18:31 )  x     / 0.01 ng/mL / x     / x     / x            Assessment/ Plan:    Impression:   Slow VT  COPD exacerbation secondary to Rhinovirus  h/o CAD/MI s/p 1 V CABG   Ischemic cardiomyopathy s/p DDD ICD  h/o rheumatic MV s/p MVR mechanical   h/o AS s/p TAVR  h/o PAF     Plan:  device interrogated  cont amiodarone  start IV Lidocaine (50mg bolus followed by 1mg/min infusion)  upgrade to CCU, approved by Dr. Daily  consider steroids  D/c lasix as bedside echo from 2/24 showed collapsed IVC  D/c antibiotics as no clear indication for bacerial infection - patient is positive for Rhinovirus. CXR does not show opacities.   cont coumadin, HOLD COUMADIN for  as INR is 4.13.  Check INR in AM  cont BB  Discussed with cardiac fellow, hospitalist.  Signed out to ICU team, Dr. Valverde.

## 2019-02-25 NOTE — PROGRESS NOTE ADULT - SUBJECTIVE AND OBJECTIVE BOX
YONATHAN FAGAN [id number]    Hospitalist Note  79yo M with Past Medical History CAD status post MI (1995), Chronic Systolic CHF status post AICD, COPD, mechanical mitral valve, bioprosthetic aortic valve, melanoma and squamous cell carcinoma status post resection admitted with cough and dyspnea x3 days secondary to acute bronchitis due to Rhinovirus.  His clinical course has been complicated by multiple episodes of NSVT, identified following PPM interrogation.    Overnight events/Updates: EPS advised upgrading the patient to the CCU and initiating a Lidocaine gtt.  The patient appears comfortable upon interview this afternoon, though he suffers from a persistent cough.    Vital Signs Last 24 Hrs  T(C): 36.6 (25 Feb 2019 11:57), Max: 36.8 (24 Feb 2019 19:10)  T(F): 97.8 (25 Feb 2019 11:57), Max: 98.2 (24 Feb 2019 19:10)  HR: 78 (25 Feb 2019 11:57) (78 - 120)  BP: 105/58 (25 Feb 2019 11:57) (105/56 - 127/70)  BP(mean): --  RR: 18 (25 Feb 2019 11:57) (18 - 20)  SpO2: 98% (25 Feb 2019 11:57) (93% - 100%)    Physical Examination:  General: AAO x 3  HEENT: PERRLA, EOMI  CV= S1 & S2 appreciated  Lungs=+ bilateral wheezes, no rales  Abdominal Examination= + BS, Soft, NT/ND  Extremity Examination= No C/C/E    ROS: No chest pain.  + Dyspnea  All other systems reviewed and are within normal limits except for the complaints in the HPI.    MEDICATIONS  (STANDING):  amiodarone Infusion 1 mG/Min (33.333 mL/Hr) IV Continuous <Continuous>  amiodarone Infusion 0.5 mG/Min (16.667 mL/Hr) IV Continuous <Continuous>  buDESOnide  80 MICROgram(s)/formoterol 4.5 MICROgram(s) Inhaler 2 Puff(s) Inhalation two times a day  furosemide   Injectable 40 milliGRAM(s) IV Push every 12 hours  ipratropium    for Nebulization 500 MICROGram(s) Nebulizer every 6 hours  lidocaine   Infusion 1 mG/Min (7.5 mL/Hr) IV Continuous <Continuous>  methylPREDNISolone sodium succinate Injectable 60 milliGRAM(s) IV Push every 12 hours  metoprolol tartrate 25 milliGRAM(s) Oral two times a day  simvastatin 40 milliGRAM(s) Oral at bedtime  sodium chloride 0.9%. 500 milliLiter(s) (100 mL/Hr) IV Continuous <Continuous>    MEDICATIONS  (PRN):                            14.9   15.02 )-----------( 191      ( 24 Feb 2019 05:46 )             45.1     02-24    141  |  99  |  34<H>  ----------------------------<  136<H>  4.1   |  27  |  1.3    Ca    9.4      24 Feb 2019 05:46  Mg     2.2     02-24    TPro  7.1  /  Alb  4.1  /  TBili  0.5  /  DBili  x   /  AST  41  /  ALT  38  /  AlkPhos  65  02-24      Case discussed with housestaff & family  WALTER Ferrer [id number]

## 2019-02-25 NOTE — PROGRESS NOTE ADULT - ASSESSMENT
77yo M with Past Medical History CAD status post MI (1995), Chronic Systolic CHF status post AICD, COPD, mechanical mitral valve, bioprosthetic aortic valve, melanoma and squamous cell carcinoma status post resection admitted with cough and dyspnea x3 days secondary to acute bronchitis due to Rhinovirus.  His clinical course has been complicated by multiple episodes of NSVT, identified following PPM interrogation.    Slow VT: EPS follow-up and recommendations were reviewed.  Patient approved by transfer to the CCU.  Continue Amiodarone gtt as directed.  Lidocaine gtt was added due to intermittent episodes of NSVT.  Follow-up results from repeat echocardiogam.  Serial cardiac enzymes were negative for ACS.  Monitor daily electrolytes.  Avoid beta agonists (albuterol).  Dyspnea secondary to acute COPD exacerbation triggered by Rhinovirus infection:  RVP was noted to be positive. pt remains symptomatic with mild wheezing.  Continue treatment with IV Solumedrol.  See above for management of nebulizers.  Continue Solumedrol 60mg IV q12  History of MVR/AVR: INR was supratherapeutic this AM.  Hold Coumadin this evening and repeat labwork for tomorrow.  Chronic Systolic CHF: continue IV Lasix as recommended by cardiology.  Monitor daily weights.  GI/DVT prophylaxis  Case discussed with housestaff 79yo M with Past Medical History CAD status post MI (1995), Chronic Systolic CHF status post AICD, COPD, mechanical mitral valve, bioprosthetic aortic valve, melanoma and squamous cell carcinoma status post resection admitted with cough and dyspnea x3 days secondary to acute bronchitis due to Rhinovirus.  His clinical course has been complicated by multiple episodes of NSVT, identified following PPM interrogation.    Slow VT: EPS follow-up and recommendations were reviewed.  Patient approved by transfer to the CCU.  Continue Amiodarone gtt as directed.  Lidocaine gtt was added due to intermittent episodes of NSVT.  Follow-up results from repeat echocardiogam.  Serial cardiac enzymes were negative for ACS.  Monitor daily electrolytes.  Avoid beta agonists (albuterol).  Dyspnea secondary to acute COPD exacerbation triggered by Rhinovirus infection:  RVP was noted to be positive. pt remains symptomatic with mild wheezing.  Continue treatment with IV Solumedrol.  See above for management of nebulizers.  Continue Solumedrol 60mg IV q12  History of MVR/AVR: INR was supratherapeutic this AM.  Hold Coumadin this evening and repeat labwork for tomorrow.  Chronic Systolic CHF: the patient appears euvolemic.  Hold IV Lasix.  Monitor daily weights.  GI/DVT prophylaxis  Case discussed with housestaff

## 2019-02-25 NOTE — CONSULT NOTE ADULT - SUBJECTIVE AND OBJECTIVE BOX
Patient is a 78y old  Male who presents with a chief complaint of sob and cough for 3 days (24 Feb 2019 11:35)    HPI:  Initial HPI: 78Y M with pmh of MI in 1995, systolic CHF s/p AICD, COPD, mechanical mitral valve, bioprosthetic aortic valve, melanoma and squamous cell carcinoma s/p resections presents to the ED with SOB and cough for 3 days. As per patient his wife was sick before him and for the past 3 days he noticed that he was getting more sob than usual. He also had associated cough with phlegm production which resolved about 2 days ago. The only associated complaint the patient had was tightness across the abdomen with exertion (walking fast but not while climbing stairs). Patient denies chest pain, nausea, vomiting, diaphoresis, fevers, chills or soar throat. He had an echo done about 2 months ago with his electrophysiologist. In the ED the patient has been tachycardic upto 118 and says that he also has orthopnea. Patient denies any history of Afib though it has been mentioned on his previous admissions. (23 Feb 2019 23:51)    Interval HPI: pt was found to have slow VT and EP was consulted.     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  Right inguinal hernia: 2006  H/O surgery to major organs, presenting hazards to health: Cholecystectomy 2017  H/O surgery to heart and great vessels, presenting hazards to health: AVR (2015 / MVR (1995)  H/O surgery to major organs, presenting hazards to health: ICD Implant 2012      FAMILY HISTORY:  FAMILY HISTORY:  Cancer (Sibling): Brother: Prostate cancer  Family history of heart disease (Sibling): Brother      SOCIAL HISTORY:  []smoker-former smoker   []Alcohol  []Drug    ALLERGIES:  No Known Allergies      MEDICATIONS:  MEDICATIONS  (STANDING):  amiodarone Infusion 1 mG/Min (33.333 mL/Hr) IV Continuous <Continuous>  amiodarone Infusion 0.5 mG/Min (16.667 mL/Hr) IV Continuous <Continuous>  buDESOnide  80 MICROgram(s)/formoterol 4.5 MICROgram(s) Inhaler 2 Puff(s) Inhalation two times a day  ipratropium    for Nebulization 500 MICROGram(s) Nebulizer every 6 hours  lidocaine   Infusion 1 mG/Min (7.5 mL/Hr) IV Continuous <Continuous>  lidocaine 2% Syringe 50 milliGRAM(s) IV Push once  metoprolol tartrate 25 milliGRAM(s) Oral two times a day  simvastatin 40 milliGRAM(s) Oral at bedtime  sodium chloride 0.9%. 500 milliLiter(s) (100 mL/Hr) IV Continuous <Continuous>  vancomycin  IVPB      vancomycin  IVPB 1000 milliGRAM(s) IV Intermittent every 12 hours    MEDICATIONS  (PRN):      HOME MEDICATIONS:  Home Medications:  amiodarone 200 mg oral tablet: 1 tab(s) orally once a day (24 Feb 2019 00:11)  Coumadin 7.5 mg oral tablet: 1 tab(s) orally once a day (24 Feb 2019 00:11)  dicyclomine 10 mg oral capsule: 1 cap(s) orally once a day (24 Feb 2019 00:11)  Incruse Ellipta 62.5 mcg/inh inhalation powder: 1 puff(s) inhaled once a day (24 Feb 2019 00:11)  Lasix 40 mg oral tablet: 1 tab(s) orally once a day (24 Feb 2019 00:11)  simvastatin 40 mg oral tablet: 1 tab(s) orally once a day (at bedtime) (24 Feb 2019 00:11)  Symbicort 80 mcg-4.5 mcg/inh inhalation aerosol: 2 puff(s) inhaled 2 times a day, As Needed (24 Feb 2019 00:11)      VITALS:   T(F): 97.8 (02-25 @ 07:38), Max: 98.7 (02-23 @ 19:15)  HR: 112 (02-25 @ 07:38) (92 - 131)  BP: 111/76 (02-25 @ 07:38) (105/56 - 180/85)  BP(mean): --  RR: 19 (02-25 @ 07:38) (19 - 22)  SpO2: 98% (02-25 @ 07:38) (93% - 98%)    I&O's Summary    24 Feb 2019 07:01  -  25 Feb 2019 07:00  --------------------------------------------------------  IN: 601 mL / OUT: 950 mL / NET: -349 mL        PHYSICAL EXAM:  GEN: Alert and oriented X 3, No acute distress  HEENT: no pallor  NECK: Supple, no JVD  LUNGS: decreased breath sounds and wheezing b/l   CARDIOVASCULAR: S1/S2 regular, no murmurs or rubs  ABD: Soft, BS+  EXT: No Lower extremity edema/cyanosis  NEURO: Non focal  SKIN: Intact    LABS:                        14.9   15.02 )-----------( 191      ( 24 Feb 2019 05:46 )             45.1     02-24    141  |  99  |  34<H>  ----------------------------<  136<H>  4.1   |  27  |  1.3    Ca    9.4      24 Feb 2019 05:46  Mg     2.2     02-24    TPro  7.1  /  Alb  4.1  /  TBili  0.5  /  DBili  x   /  AST  41  /  ALT  38  /  AlkPhos  65  02-24    PT/INR - ( 23 Feb 2019 19:00 )   PT: 23.80 sec;   INR: 2.08 ratio         PTT - ( 23 Feb 2019 19:00 )  PTT:40.4 sec    CARDIAC MARKERS ( 24 Feb 2019 05:46 )  x     / 0.01 ng/mL / 206 U/L / x     / 4.8 ng/mL  CARDIAC MARKERS ( 23 Feb 2019 23:41 )  x     / <0.01 ng/mL / 276 U/L / x     / 5.0 ng/mL  CARDIAC MARKERS ( 23 Feb 2019 18:31 )  x     / 0.01 ng/mL / x     / x     / x            Troponin trend:    Serum Pro-Brain Natriuretic Peptide: 3127 pg/mL (02-23-19 @ 18:31)      Hemoglobin A1C   Thyroid      RADIOLOGY:  -CXR:  < from: Xray Chest 1 View AP/PA (02.23.19 @ 20:45) >    Impression:      No radiographic evidence of acute cardiopulmonary disease.    < end of copied text >    -CT:  < from: CT Angio Chest Dissection Protocol (02.24.19 @ 06:24) >  IMPRESSION:     Ascending thoracic aortic aneurysm measuring up to 4.8 cm and descending   thoracic aortic aneurysm measuring 3.7 cm, stable.    No evidence for aortic dissection.    Indeterminate right interpolar 1.1 cm renal lesion. Follow-up renal mass   protocol CT or MRI is recommended in 3-6 months.    < end of copied text >      ECG:  < from: 12 Lead ECG (02.24.19 @ 04:50) >    Diagnosis Line Sinus tachycardia  Possible Left atrial enlargement  Rightward axis  Borderline ECG    < end of copied text >

## 2019-02-26 LAB
ALBUMIN SERPL ELPH-MCNC: 3.7 G/DL — SIGNIFICANT CHANGE UP (ref 3.5–5.2)
ALP SERPL-CCNC: 66 U/L — SIGNIFICANT CHANGE UP (ref 30–115)
ALT FLD-CCNC: 33 U/L — SIGNIFICANT CHANGE UP (ref 0–41)
ANION GAP SERPL CALC-SCNC: 14 MMOL/L — SIGNIFICANT CHANGE UP (ref 7–14)
APTT BLD: 42.2 SEC — HIGH (ref 27–39.2)
AST SERPL-CCNC: 24 U/L — SIGNIFICANT CHANGE UP (ref 0–41)
BILIRUB SERPL-MCNC: 0.6 MG/DL — SIGNIFICANT CHANGE UP (ref 0.2–1.2)
BUN SERPL-MCNC: 32 MG/DL — HIGH (ref 10–20)
CALCIUM SERPL-MCNC: 9.2 MG/DL — SIGNIFICANT CHANGE UP (ref 8.5–10.1)
CHLORIDE SERPL-SCNC: 99 MMOL/L — SIGNIFICANT CHANGE UP (ref 98–110)
CK MB CFR SERPL CALC: 2.4 NG/ML — SIGNIFICANT CHANGE UP (ref 0.6–6.3)
CK SERPL-CCNC: 61 U/L — SIGNIFICANT CHANGE UP (ref 0–225)
CO2 SERPL-SCNC: 30 MMOL/L — SIGNIFICANT CHANGE UP (ref 17–32)
CREAT SERPL-MCNC: 1 MG/DL — SIGNIFICANT CHANGE UP (ref 0.7–1.5)
GLUCOSE SERPL-MCNC: 148 MG/DL — HIGH (ref 70–99)
HCT VFR BLD CALC: 43.6 % — SIGNIFICANT CHANGE UP (ref 42–52)
HGB BLD-MCNC: 14.1 G/DL — SIGNIFICANT CHANGE UP (ref 14–18)
INR BLD: 4.07 RATIO — HIGH (ref 0.65–1.3)
MAGNESIUM SERPL-MCNC: 2.3 MG/DL — SIGNIFICANT CHANGE UP (ref 1.8–2.4)
MCHC RBC-ENTMCNC: 28.4 PG — SIGNIFICANT CHANGE UP (ref 27–31)
MCHC RBC-ENTMCNC: 32.3 G/DL — SIGNIFICANT CHANGE UP (ref 32–37)
MCV RBC AUTO: 87.7 FL — SIGNIFICANT CHANGE UP (ref 80–94)
NRBC # BLD: 0 /100 WBCS — SIGNIFICANT CHANGE UP (ref 0–0)
PLATELET # BLD AUTO: 204 K/UL — SIGNIFICANT CHANGE UP (ref 130–400)
POTASSIUM SERPL-MCNC: 4.4 MMOL/L — SIGNIFICANT CHANGE UP (ref 3.5–5)
POTASSIUM SERPL-SCNC: 4.4 MMOL/L — SIGNIFICANT CHANGE UP (ref 3.5–5)
PROT SERPL-MCNC: 6.7 G/DL — SIGNIFICANT CHANGE UP (ref 6–8)
PROTHROM AB SERPL-ACNC: >40 SEC — HIGH (ref 9.95–12.87)
RBC # BLD: 4.97 M/UL — SIGNIFICANT CHANGE UP (ref 4.7–6.1)
RBC # FLD: 12.4 % — SIGNIFICANT CHANGE UP (ref 11.5–14.5)
SODIUM SERPL-SCNC: 143 MMOL/L — SIGNIFICANT CHANGE UP (ref 135–146)
TROPONIN T SERPL-MCNC: <0.01 NG/ML — SIGNIFICANT CHANGE UP
WBC # BLD: 9.38 K/UL — SIGNIFICANT CHANGE UP (ref 4.8–10.8)
WBC # FLD AUTO: 9.38 K/UL — SIGNIFICANT CHANGE UP (ref 4.8–10.8)

## 2019-02-26 RX ORDER — AMIODARONE HYDROCHLORIDE 400 MG/1
200 TABLET ORAL DAILY
Qty: 0 | Refills: 0 | Status: DISCONTINUED | OUTPATIENT
Start: 2019-02-26 | End: 2019-02-26

## 2019-02-26 RX ORDER — ALBUTEROL 90 UG/1
2.5 AEROSOL, METERED ORAL EVERY 8 HOURS
Qty: 0 | Refills: 0 | Status: DISCONTINUED | OUTPATIENT
Start: 2019-02-26 | End: 2019-02-27

## 2019-02-26 RX ORDER — MEXILETINE HYDROCHLORIDE 150 MG/1
200 CAPSULE ORAL
Qty: 0 | Refills: 0 | Status: DISCONTINUED | OUTPATIENT
Start: 2019-02-26 | End: 2019-02-27

## 2019-02-26 RX ORDER — AMIODARONE HYDROCHLORIDE 400 MG/1
200 TABLET ORAL DAILY
Qty: 0 | Refills: 0 | Status: DISCONTINUED | OUTPATIENT
Start: 2019-02-27 | End: 2019-02-27

## 2019-02-26 RX ORDER — TIOTROPIUM BROMIDE 18 UG/1
1 CAPSULE ORAL; RESPIRATORY (INHALATION) DAILY
Qty: 0 | Refills: 0 | Status: DISCONTINUED | OUTPATIENT
Start: 2019-02-26 | End: 2019-02-27

## 2019-02-26 RX ADMIN — BUDESONIDE AND FORMOTEROL FUMARATE DIHYDRATE 2 PUFF(S): 160; 4.5 AEROSOL RESPIRATORY (INHALATION) at 07:39

## 2019-02-26 RX ADMIN — AMIODARONE HYDROCHLORIDE 400 MILLIGRAM(S): 400 TABLET ORAL at 05:58

## 2019-02-26 RX ADMIN — Medication 500 MICROGRAM(S): at 09:00

## 2019-02-26 RX ADMIN — Medication 60 MILLIGRAM(S): at 05:57

## 2019-02-26 RX ADMIN — BUDESONIDE AND FORMOTEROL FUMARATE DIHYDRATE 2 PUFF(S): 160; 4.5 AEROSOL RESPIRATORY (INHALATION) at 21:16

## 2019-02-26 RX ADMIN — CHLORHEXIDINE GLUCONATE 1 APPLICATION(S): 213 SOLUTION TOPICAL at 05:57

## 2019-02-26 RX ADMIN — SIMVASTATIN 40 MILLIGRAM(S): 20 TABLET, FILM COATED ORAL at 21:16

## 2019-02-26 RX ADMIN — ALBUTEROL 2.5 MILLIGRAM(S): 90 AEROSOL, METERED ORAL at 17:07

## 2019-02-26 RX ADMIN — MEXILETINE HYDROCHLORIDE 200 MILLIGRAM(S): 150 CAPSULE ORAL at 14:09

## 2019-02-26 RX ADMIN — Medication 25 MILLIGRAM(S): at 05:58

## 2019-02-26 RX ADMIN — Medication 600 MILLIGRAM(S): at 10:09

## 2019-02-26 RX ADMIN — MEXILETINE HYDROCHLORIDE 200 MILLIGRAM(S): 150 CAPSULE ORAL at 21:16

## 2019-02-26 RX ADMIN — Medication 25 MILLIGRAM(S): at 18:16

## 2019-02-26 NOTE — PROGRESS NOTE ADULT - ASSESSMENT
Impression:   Slow VT most likely triggered by Albuterol  SOB likely due to COPD exacerbation, severe underlying copd  h/o CAD/MI s/p 1 V CABG   ischemic cardiomyopathy s/p DDD ICD  h/o rheumatic MV s/p MVR mechanical   h/o AS s/p TAVR  h/o PAF       Plan:  no further VT  cont amiodarone  Can stop lidocaine  start Mexiletine 200mg BID (overlap with lidocaine)  CCU monitoring   f/u with pulmonary  cont coumadin,. check INR today, target INR 3.0 in view of mechanical MV  cont BB  will follow Impression:   Slow VT most likely triggered by Albuterol  SOB likely due to COPD exacerbation, severe underlying copd  h/o CAD/MI s/p 1 V CABG   ischemic cardiomyopathy s/p DDD ICD  h/o rheumatic MV s/p MVR mechanical   h/o AS s/p TAVR  h/o PAF       Plan:  no further VT  cont amiodarone 200 mg/d  Can stop lidocaine  start Mexiletine 200mg BID (overlap with lidocaine)  CCU monitoring   f/u with pulmonary for respiratory  therapy with minimal use of B2 Agonist and if pt require antibiotics  cont coumadin,. check INR today, target INR 3.0 in view of mechanical MV  reduce metoprolol to 25 mg /day  will follow

## 2019-02-26 NOTE — PROGRESS NOTE ADULT - SUBJECTIVE AND OBJECTIVE BOX
________________________________________________________________________________  DAILY PROGRESS NOTE:    ================== SUBJECTIVE ==================    YONATHAN GRACIA  /   78y  /  Male  /  MRN#: [id number]  Patient is a 78y old Male who presents with a chief complaint of sob and cough for 3 days (25 Feb 2019 12:50)  Currently admitted to medicine with the primary diagnosis of CHF exacerbation/COPD exacerbation.      HOSPITAL DAY: 2d, CCU day 1    SUMMARY OF HOSPITAL COURSE TO DATE 78Y M with pmh of MI in 1995, systolic CHF s/p AICD, COPD on home oxygen, mechanical mitral valve(rheumatic heart disease), bioprosthetic aortic valve, melanoma and squamous cell carcinoma s/p resections presents to the ED with SOB and cough for 3 days.  In ED, pt had CT chest with IV contrast showing ascending thoracic aortic aneurysm but no aortic dissection.  CXR negative for effusion.  Cardiac enzymes have been negative x 3.  His EKG showed wide complex v tach at HR of 113.  EP was consulted for device interrogation and pt was found to have episodes of slow Vtach.  Pt had RVP performed, negative for influenza but positive for Rhinovirus (enterovirus). EP recommended lidocaine drip and upgrade to CCU, pt was already loaded with I/V Amiodarone.     OVERNIGHT EVENTS: none    TODAY: Patient was seen this morning at bedside. Currently, the patient reports no active complaints.    Review of systems is otherwise negative.    =================== OBJECTIVE ===================    VITAL SIGNS: Last 24 Hours  T(C): 36.6 (26 Feb 2019 00:00), Max: 36.6 (25 Feb 2019 07:38)  T(F): 97.8 (26 Feb 2019 00:00), Max: 97.8 (25 Feb 2019 07:38)  HR: 78 (26 Feb 2019 00:00) (78 - 113)  BP: 108/59 (26 Feb 2019 00:00) (100/60 - 127/70)  BP(mean): 71 (26 Feb 2019 00:00) (71 - 91)  RR: 18 (26 Feb 2019 00:00) (17 - 25)  SpO2: 99% (26 Feb 2019 00:00) (95% - 100%)    02-24-19 @ 07:01  -  02-25-19 @ 07:00  --------------------------------------------------------  IN: 601 mL / OUT: 950 mL / NET: -349 mL    02-25-19 @ 07:01  -  02-26-19 @ 02:06  --------------------------------------------------------  IN: 142.5 mL / OUT: 0 mL / NET: 142.5 mL      PHYSICAL EXAM:  GENERAL: NAD, well-developed  HEAD:  Atraumatic, Normocephalic  EYES: EOMI, PERRLA, conjunctiva and sclera clear  NECK: Supple, No JVD  CHEST/LUNG: B/L wheezes and rhonchi  HEART: Regular rate and rhythm; No murmurs, rubs, or gallops  ABDOMEN: Soft, Nontender, Nondistended; Bowel sounds present  EXTREMITIES:  2+ Peripheral Pulses, No clubbing, cyanosis, or edema  PSYCH: AAOx3  NEUROLOGY: non-focal  SKIN: No rashes or lesions    ===================== LABS =====================                        14.9   15.02 )-----------( 191      ( 24 Feb 2019 05:46 )             45.1     02-24    141  |  99  |  34<H>  ----------------------------<  136<H>  4.1   |  27  |  1.3    Ca    9.4      24 Feb 2019 05:46  Mg     2.2     02-24    TPro  7.1  /  Alb  4.1  /  TBili  0.5  /  DBili  x   /  AST  41  /  ALT  38  /  AlkPhos  65  02-24    PT/INR - ( 25 Feb 2019 11:47 )   PT: >40.00 sec;   INR: 4.13 ratio         PTT - ( 25 Feb 2019 11:47 )  PTT:50.5 sec      Creatine Kinase, Serum: 99 U/L (02-25-19 @ 11:47)  Troponin T, Serum: <0.01 ng/mL (02-25-19 @ 11:47)    CARDIAC MARKERS ( 25 Feb 2019 11:47 )  x     / <0.01 ng/mL / 99 U/L / x     / 2.9 ng/mL  CARDIAC MARKERS ( 24 Feb 2019 05:46 )  x     / 0.01 ng/mL / 206 U/L / x     / 4.8 ng/mL      ================= MICROBIOLOGY ================    Culture - Blood (collected 24 Feb 2019 07:00)  Source: .Blood Blood-Peripheral  Preliminary Report (25 Feb 2019 18:01):    No growth to date.      ================== IMAGING TO DATE ==================  < from: Xray Chest 1 View-PORTABLE IMMEDIATE (02.25.19 @ 11:03) >  Impression:      No consolidation, effusion or pneumothorax    < end of copied text >  < from: CT Angio Chest Dissection Protocol (02.24.19 @ 06:24) >  IMPRESSION:     Ascending thoracic aortic aneurysm measuring up to 4.8 cm and descending   thoracic aortic aneurysm measuring 3.7 cm, stable.    No evidence for aortic dissection.    Indeterminate right interpolar 1.1 cm renal lesion. Follow-up renal mass   protocol CT or MRI is recommended in 3-6 months.    < end of copied text >    ================== OTHER SIGNIFICANT FINDINGS ==================    ================== ALLERGIES ===================  No Known Allergies    ==================== MEDS =====================  amiodarone    Tablet 400 milliGRAM(s) Oral two times a day  amiodarone Infusion 1 mG/Min IV Continuous <Continuous>  amiodarone Infusion 0.5 mG/Min IV Continuous <Continuous>  buDESOnide  80 MICROgram(s)/formoterol 4.5 MICROgram(s) Inhaler 2 Puff(s) Inhalation two times a day  chlorhexidine 4% Liquid 1 Application(s) Topical <User Schedule>  influenza   Vaccine 0.5 milliLiter(s) IntraMuscular once  ipratropium    for Nebulization 500 MICROGram(s) Nebulizer every 6 hours  lidocaine   Infusion 1 mG/Min IV Continuous <Continuous>  methylPREDNISolone sodium succinate Injectable 60 milliGRAM(s) IV Push every 12 hours  metoprolol tartrate 25 milliGRAM(s) Oral two times a day  simvastatin 40 milliGRAM(s) Oral at bedtime  sodium chloride 0.9%. 500 milliLiter(s) IV Continuous <Continuous>    PRN MEDICATIONS      ================= CONSULTS ==================    EP  Impression:   Slow VT  SOB likely due to COPD exacerbation  h/o CAD/MI s/p 1 V CABG   ischemic cardiomyopathy s/p DDD ICD  h/o rheumatic MV s/p MVR mechanical   h/o AS s/p TAVR  h/o PAF       Plan:  device interrogated  cont amiodarone  start IV Lidocaine (50mg bolus followed by 1mg/min infusion)  upgrade to CCU  consider steroids  cont coumadin,. check INR today, target INR 3.0 in view of mechanical MV  cont BB  will follow

## 2019-02-26 NOTE — CHART NOTE - NSCHARTNOTEFT_GEN_A_CORE
patient wished to rescind active DNR / DNI MOLST form   he  is FULL CODE  however per pt if brain death - does not want further medical measures

## 2019-02-26 NOTE — PROGRESS NOTE ADULT - ASSESSMENT
79yo M with Past Medical History CAD status post MI (1995), Chronic Systolic CHF status post AICD, COPD, mechanical mitral valve, bioprosthetic aortic valve, melanoma and squamous cell carcinoma status post resection admitted with cough and dyspnea x3 days secondary to acute bronchitis due to Rhinovirus.  His clinical course has been complicated by multiple episodes of NSVT, identified following PPM interrogation.    #Slow VT:  poss triggered by albuterol  EPS appreciated  on amiodarone and lidocaine, started on mexilitine.    f/u today's ECHO.    serial CE negative.      #acute COPD exacerbation 2/2 rhinovirus infection  continue on solumedrol 60mg iV q12h  continue atrovent, pulmicort     #hx rheumatic heart disease s/p mechanical MVR, bioprosthetic AVR  INR 4.07 today, hold coumadin tonight, recheck INR in AM  INR goal 2.5-3.5    #Chronic Systolic CHF s/p AICD  #CAD s/p CABG  #PPx - on coumadin 79yo M with Past Medical History CAD status post MI (1995), Chronic Systolic CHF status post AICD, COPD, mechanical mitral valve, bioprosthetic aortic valve, melanoma and squamous cell carcinoma status post resection admitted with cough and dyspnea x3 days secondary to acute bronchitis due to Rhinovirus.  His clinical course has been complicated by multiple episodes of NSVT, identified following PPM interrogation.    #Slow VT:  poss triggered by albuterol  EPS appreciated  on amiodarone and lidocaine, started on mexilitine.    f/u today's ECHO.    serial CE negative  continue close monitoring on telemetry    #acute COPD exacerbation 2/2 rhinovirus infection  continue on solumedrol 60mg iV q12h  continue atrovent, pulmicort   pulmonary eval pending    #hx rheumatic heart disease s/p mechanical MVR, bioprosthetic AVR  INR 4.07 today, hold coumadin tonight, recheck INR in AM  INR goal 2.5-3.5    #Chronic Systolic CHF s/p AICD   f/u repeat ECHO  on metoprolol 25mg PO BID    #CAD s/p CABG  #PPx - on coumadin 77yo M with Past Medical History CAD status post MI (1995), Chronic Systolic CHF status post AICD, COPD, mechanical mitral valve, bioprosthetic aortic valve, melanoma and squamous cell carcinoma status post resection admitted with cough and dyspnea x3 days secondary to acute bronchitis due to Rhinovirus.  His clinical course has been complicated by multiple episodes of NSVT, identified following PPM interrogation.    #Slow VT:  poss triggered by albuterol  EPS appreciated  on amiodarone and lidocaine, started on mexilitine.    f/u today's ECHO.    serial CE negative  continue close monitoring on telemetry    #acute COPD exacerbation 2/2 rhinovirus infection  continue on solumedrol 60mg iV q12h  continue atrovent, pulmicort   pulmonary eval pending    #hx rheumatic heart disease s/p mechanical MVR, bioprosthetic AVR  INR 4.07 today, hold coumadin tonight, recheck INR in AM  INR goal 2.5-3.5    #Chronic Systolic CHF s/p AICD   f/u repeat ECHO  on metoprolol 25mg PO BID    #CAD s/p CABG  #PPx - on coumadin    -I agree withe exam/assessment after my independent evaluation

## 2019-02-26 NOTE — PROGRESS NOTE ADULT - SUBJECTIVE AND OBJECTIVE BOX
YONATHAN FAGAN  78y  Male    Allergy: No Known Allergies      Patient is a 78y old  Male who presents with a chief complaint of sob and cough for 3 days (26 Feb 2019 02:05)      INTERVAL HPI/OVERNIGHT EVENTS:  seen and examined pt at bedside. no overnight events.     REVIEW OF SYSTEMS:  All other review of systems negative    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  Right inguinal hernia: 2006  H/O surgery to major organs, presenting hazards to health: Cholecystectomy 2017  H/O surgery to heart and great vessels, presenting hazards to health: AVR (2015 / MVR (1995)  H/O surgery to major organs, presenting hazards to health: ICD Implant 2012      Vital Signs Last 24 Hrs  T(C): 36.6 (26 Feb 2019 07:39), Max: 36.8 (26 Feb 2019 04:00)  T(F): 97.8 (26 Feb 2019 07:39), Max: 98.2 (26 Feb 2019 04:00)  HR: 78 (26 Feb 2019 07:39) (78 - 82)  BP: 97/71 (26 Feb 2019 07:39) (97/71 - 127/70)  BP(mean): 84 (26 Feb 2019 07:39) (71 - 94)  RR: 18 (26 Feb 2019 07:39) (17 - 25)  SpO2: 92% (26 Feb 2019 07:39) (92% - 100%)    I&O's Summary    25 Feb 2019 07:01  -  26 Feb 2019 07:00  --------------------------------------------------------  IN: 322.5 mL / OUT: 400 mL / NET: -77.5 mL    26 Feb 2019 07:01  -  26 Feb 2019 08:51  --------------------------------------------------------  IN: 7.5 mL / OUT: 0 mL / NET: 7.5 mL        Home Medications:  amiodarone 200 mg oral tablet: 1 tab(s) orally once a day (24 Feb 2019 00:11)  Coumadin 7.5 mg oral tablet: 1 tab(s) orally once a day (24 Feb 2019 00:11)  dicyclomine 10 mg oral capsule: 1 cap(s) orally once a day (24 Feb 2019 00:11)  Incruse Ellipta 62.5 mcg/inh inhalation powder: 1 puff(s) inhaled once a day (24 Feb 2019 00:11)  Lasix 40 mg oral tablet: 1 tab(s) orally once a day (24 Feb 2019 00:11)  simvastatin 40 mg oral tablet: 1 tab(s) orally once a day (at bedtime) (24 Feb 2019 00:11)  Symbicort 80 mcg-4.5 mcg/inh inhalation aerosol: 2 puff(s) inhaled 2 times a day, As Needed (24 Feb 2019 00:11)      PHYSICAL EXAM:  GENERAL: NAD  HEENT: no pallor/icterus  NECK: supple  PSYCH: no agitation, baseline mentation  NERVOUS SYSTEM:  Alert & Oriented X3, no focal deficits  PULMONARY: decreased breath sounds b/l with wheeze   CARDIOVASCULAR: Regular rate and rhythm; No murmurs, rubs, or gallops  GI: Soft, Nontender, Nondistended; Bowel sounds present  EXTREMITIES:  2+ Peripheral Pulses, No clubbing, cyanosis, or edema        LABS                        14.1   9.38  )-----------( 204      ( 26 Feb 2019 06:01 )             43.6     02-26    143  |  99  |  32<H>  ----------------------------<  148<H>  4.4   |  30  |  1.0    Ca    9.2      26 Feb 2019 06:01  Mg     2.3     02-26    TPro  6.7  /  Alb  3.7  /  TBili  0.6  /  DBili  x   /  AST  24  /  ALT  33  /  AlkPhos  66  02-26    CARDIAC MARKERS ( 26 Feb 2019 06:01 )  x     / <0.01 ng/mL / 61 U/L / x     / 2.4 ng/mL  CARDIAC MARKERS ( 25 Feb 2019 11:47 )  x     / <0.01 ng/mL / 99 U/L / x     / 2.9 ng/mL        Culture - Blood (collected 24 Feb 2019 07:00)  Source: .Blood Blood-Peripheral  Preliminary Report (25 Feb 2019 18:01):    No growth to date.      PT/INR - ( 26 Feb 2019 06:01 )   PT: >40.00 sec;   INR: 4.07 ratio         PTT - ( 26 Feb 2019 06:01 )  PTT:42.2 sec    RADIOLOGY & ADDITIONAL TESTS:      MEDICATIONS  (STANDING):  amiodarone    Tablet 400 milliGRAM(s) Oral two times a day  amiodarone Infusion 1 mG/Min (33.333 mL/Hr) IV Continuous <Continuous>  amiodarone Infusion 0.5 mG/Min (16.667 mL/Hr) IV Continuous <Continuous>  buDESOnide  80 MICROgram(s)/formoterol 4.5 MICROgram(s) Inhaler 2 Puff(s) Inhalation two times a day  chlorhexidine 4% Liquid 1 Application(s) Topical <User Schedule>  guaiFENesin  milliGRAM(s) Oral every 12 hours  influenza   Vaccine 0.5 milliLiter(s) IntraMuscular once  ipratropium    for Nebulization 500 MICROGram(s) Nebulizer every 6 hours  lidocaine   Infusion 1 mG/Min (7.5 mL/Hr) IV Continuous <Continuous>  methylPREDNISolone sodium succinate Injectable 60 milliGRAM(s) IV Push every 12 hours  metoprolol tartrate 25 milliGRAM(s) Oral two times a day  simvastatin 40 milliGRAM(s) Oral at bedtime  sodium chloride 0.9%. 500 milliLiter(s) (100 mL/Hr) IV Continuous <Continuous>    MEDICATIONS  (PRN):        < from: 12 Lead ECG (02.25.19 @ 13:11) >    Diagnosis Line AV dual-paced rhythm with prolonged AV conduction  Abnormal ECG    < end of copied text >

## 2019-02-26 NOTE — CONSULT NOTE ADULT - ASSESSMENT
Impression    AECOPD  rhinovirus URI  V-tach  MVR on coumadin      Recommendations     Prednisone 40 mg PO x 5 days   Albuterol Nebs q8h and as needed   Tiotropium once a day  Contact/droplet isolation  Continue with coumadin Impression    AECOPD  rhinovirus URI  V-tach  MVR on coumadin      Recommendations     Prednisone 40 mg PO x 5 days than 20 mg for 5 days  xopenex as needed  doxy 100 mg twice daily for 7 days  Tiotropium once a day  Contact/droplet isolation  Continue with coumadin

## 2019-02-26 NOTE — PROGRESS NOTE ADULT - SUBJECTIVE AND OBJECTIVE BOX
YONATHAN FAGAN  78y, Male  Allergy: No Known Allergies    Hospital Day: 2d    Patient seen and examined earlier today.   He still c/o cough, denies SOB or chest pain.    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  Right inguinal hernia: 2006  H/O surgery to major organs, presenting hazards to health: Cholecystectomy 2017  H/O surgery to heart and great vessels, presenting hazards to health: AVR (2015 / MVR (1995)  H/O surgery to major organs, presenting hazards to health: ICD Implant 2012      VITALS:  T(F): 97.8 (02-26-19 @ 07:39), Max: 98.2 (02-26-19 @ 04:00)  HR: 78 (02-26-19 @ 10:12)  BP: 108/67 (02-26-19 @ 10:12) (97/71 - 120/70)  RR: 29 (02-26-19 @ 10:12)  SpO2: 96% (02-26-19 @ 10:12)    TESTS & MEASUREMENTS:  Weight (Kg): 71.3 (02-25-19 @ 21:27)  BMI (kg/m2): 26.2 (02-25)    02-24-19 @ 07:01  -  02-25-19 @ 07:00  --------------------------------------------------------  IN: 601 mL / OUT: 950 mL / NET: -349 mL    02-25-19 @ 07:01  -  02-26-19 @ 07:00  --------------------------------------------------------  IN: 322.5 mL / OUT: 400 mL / NET: -77.5 mL    02-26-19 @ 07:01  -  02-26-19 @ 11:44  --------------------------------------------------------  IN: 270 mL / OUT: 0 mL / NET: 270 mL                            14.1   9.38  )-----------( 204      ( 26 Feb 2019 06:01 )             43.6     PT/INR - ( 26 Feb 2019 06:01 )   PT: >40.00 sec;   INR: 4.07 ratio         PTT - ( 26 Feb 2019 06:01 )  PTT:42.2 sec  02-26    143  |  99  |  32<H>  ----------------------------<  148<H>  4.4   |  30  |  1.0    Ca    9.2      26 Feb 2019 06:01  Mg     2.3     02-26    TPro  6.7  /  Alb  3.7  /  TBili  0.6  /  DBili  x   /  AST  24  /  ALT  33  /  AlkPhos  66  02-26    LIVER FUNCTIONS - ( 26 Feb 2019 06:01 )  Alb: 3.7 g/dL / Pro: 6.7 g/dL / ALK PHOS: 66 U/L / ALT: 33 U/L / AST: 24 U/L / GGT: x           CARDIAC MARKERS ( 26 Feb 2019 06:01 )  x     / <0.01 ng/mL / 61 U/L / x     / 2.4 ng/mL  CARDIAC MARKERS ( 25 Feb 2019 11:47 )  x     / <0.01 ng/mL / 99 U/L / x     / 2.9 ng/mL        Culture - Blood (collected 02-24-19 @ 07:00)  Source: .Blood Blood-Peripheral  Preliminary Report (02-25-19 @ 18:01):    No growth to date.      RADIOLOGY & ADDITIONAL TESTS:  < from: Xray Chest 1 View-PORTABLE IMMEDIATE (02.25.19 @ 11:03) >  Impression:      No consolidation, effusion or pneumothorax    < end of copied text >      MEDICATIONS:  MEDICATIONS  (STANDING):  amiodarone    Tablet 400 milliGRAM(s) Oral two times a day  amiodarone Infusion 1 mG/Min (33.333 mL/Hr) IV Continuous <Continuous>  amiodarone Infusion 0.5 mG/Min (16.667 mL/Hr) IV Continuous <Continuous>  buDESOnide  80 MICROgram(s)/formoterol 4.5 MICROgram(s) Inhaler 2 Puff(s) Inhalation two times a day  chlorhexidine 4% Liquid 1 Application(s) Topical <User Schedule>  guaiFENesin  milliGRAM(s) Oral every 12 hours  influenza   Vaccine 0.5 milliLiter(s) IntraMuscular once  ipratropium    for Nebulization 500 MICROGram(s) Nebulizer every 6 hours  lidocaine   Infusion 1 mG/Min (7.5 mL/Hr) IV Continuous <Continuous>  methylPREDNISolone sodium succinate Injectable 60 milliGRAM(s) IV Push every 12 hours  metoprolol tartrate 25 milliGRAM(s) Oral two times a day  mexiletine 200 milliGRAM(s) Oral two times a day  simvastatin 40 milliGRAM(s) Oral at bedtime  sodium chloride 0.9%. 500 milliLiter(s) (100 mL/Hr) IV Continuous <Continuous>    MEDICATIONS  (PRN):      HOME MEDICATIONS:  amiodarone 200 mg oral tablet (02-24)  Coumadin 7.5 mg oral tablet (02-24)  dicyclomine 10 mg oral capsule (02-24)  Incruse Ellipta 62.5 mcg/inh inhalation powder (02-24)  Lasix 40 mg oral tablet (02-24)  simvastatin 40 mg oral tablet (02-24)  Symbicort 80 mcg-4.5 mcg/inh inhalation aerosol (02-24)      PHYSICAL EXAM:  GENERAL: A&O x3,  in NAD/P,   NECK: No Swelling  CHEST/LUNG: coarse breath sounds b/l  HEART: RRR  ABDOMEN: Soft, Bowel sounds present  EXTREMITIES:  No clubbing, no edema

## 2019-02-26 NOTE — PROGRESS NOTE ADULT - ASSESSMENT
================= ASSESS/PLAN ==================  Patient is a 78y old Male who presents with a chief complaint of sob and cough for 3 days (25 Feb 2019 12:50)  Currently admitted to medicine with the primary diagnosis of COPD exacerbation and found to have slow V tach on device interrogation.      PLAN    # Slow V tach  - pt came in with shortness of breath and found to have episodes of tachycardia to 120s on tele with wide QRS complexes on 12 lead EKG.  - pt has AICD device interrogation showed V tach, pt seen by cardio and EP; loaded with amio and lidocaine currently on metoprolol 25mg BID, Amiodarone and Lidocaine drips.  - Heart rate in 80s-90s on tele, cardiac enzymes negative X3, pending 2 d ECHO, pt has hx of MI, HFrEF.   - keep K above 4, Mag above 2, monitor electrolytes and QTC daily. F/U with EP in am.    # shortness of breath secondary to COPD exacerbation from entero/rhinovirus.    - Pt came in with hx of SOB associated with cough and increased sputum production.  - RVP positive for entero/hinovirus. Blood cultures negative, no clear patch on CXR, WBCs elevated to 15K with predominant neutrophils.  - pt is off antibiotics for now; consider ABX for secondary pneumonia prevention, monitor WBCs and follow cultures.  - pt still wheezing c/w symbicort and atrovent( avoid albuterol given tachycardia), increase I/V steroids to 60mg q8 if pt is still wheezing in am.    # hx of rheumatic heart disease s/p mechanical Mitral valve and bioprosthetic aortic valve.  - On coumadin to target INR of 2.5-3.5, currently coumadin on hold as INR was supratherapeutic 4.13.  - f/u INR in am and dose coumadin accordingly. Follow up 2D echo.    # Acute exacerbation of CHFrEF (resolved)  - s/p I/V lasix, currently euvolemic, monitor for fluid overload and follow up 2D echo.    # Thoracic aortic aneurism  - CT angio showed Ascending thoracic aortic aneurysm measuring up to 4.8 cm and descending thoracic aortic aneurysm measuring 3.7 cm, stable.  - monitor BP consider labetalol if BP is high.    # Incidental right renal lesion found on CT scan; Indeterminate right interpolar 1.1 cm renal lesion.   Out patient follow-up renal mass protocol CT or MRI is recommended in 3-6 months.    # DLD; c/w statins    GI PPX: Protonix 40mg   DVT PPX: supratherapeutic INR on coumadin.    DIET:  () Regular  /  (x) Cardiac  / () Renal  /  () Diabetic  /  (x) Fluid restriction  /   () NPO  ACTIVITY:  () Ad Margarita  /  (x) Advance as Tolerated  /  () Bed Rest  /  () Fall Precaution  /  () Seizure precaution    ================= DISPOSITION ==================    c/w CCU care for now.    ================= CODE STATUS =================                  () FULL CODE     |     (x) DNR only   |     () DNI

## 2019-02-26 NOTE — CONSULT NOTE ADULT - SUBJECTIVE AND OBJECTIVE BOX
Patient is a 78y old  Male who presents with a chief complaint of sob and cough for 3 days (26 Feb 2019 11:44)      HPI:  78Y M with pmh of MI in 1995, systolic CHF s/p AICD, COPD, mechanical mitral valve, bioprosthetic aortic valve, melanoma and squamous cell carcinoma s/p resections presents to the ED with SOB and cough for 3 days. As per patient his wife was sick before him and for the past 3 days he noticed that he was getting more sob than usual. He also had associated cough with phlegm production which resolved about 2 days ago. The only associated complaint the patient had was tightness across the abdomen with exertion (walking fast but not while climbing stairs). Patient denies chest pain, nausea, vomiting, diaphoresis, fevers, chills or soar throat. He had an echo done about 2 months ago with his electrophysiologist. In the ED the patient has been tachycardic upto 118 and says that he also has orthopnea. Patient denies any history of Afib though it has been mentioned on his previous admissions. 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  No cardiovascular or pulmonary family history     Review of System:  See HPI    Allergies    No Known Allergies    Intolerances          PHYSICAL EXAM  Vital Signs Last 24 Hrs  T(C): 36.4 (26 Feb 2019 11:46), Max: 36.8 (26 Feb 2019 04:00)  T(F): 97.5 (26 Feb 2019 11:46), Max: 98.2 (26 Feb 2019 04:00)  HR: 80 (26 Feb 2019 11:46) (78 - 82)  BP: 122/74 (26 Feb 2019 11:46) (97/71 - 122/74)  BP(mean): 101 (26 Feb 2019 11:46) (71 - 101)  RR: 41 (26 Feb 2019 11:46) (17 - 41)  SpO2: 96% (26 Feb 2019 11:46) (92% - 100%)    General: In NAD  HEENT: SIGRID             Lymphatic system: No cervical LN     Lungs: Armand BS, b/l wheezing (end exp)  Cardiovascular: Regular  Gastrointestinal: Soft.  + BS   Musculoskeletal: No Clubbing.  Full range of motion.. Moves all extremities  Skin: Warm.  Intact  Neurological: No motor or sensory deficit       LABS:                          14.1   9.38  )-----------( 204      ( 26 Feb 2019 06:01 )             43.6                                               02-26    143  |  99  |  32<H>  ----------------------------<  148<H>  4.4   |  30  |  1.0    Ca    9.2      26 Feb 2019 06:01  Mg     2.3     02-26    TPro  6.7  /  Alb  3.7  /  TBili  0.6  /  DBili  x   /  AST  24  /  ALT  33  /  AlkPhos  66  02-26      PT/INR - ( 26 Feb 2019 06:01 )   PT: >40.00 sec;   INR: 4.07 ratio         PTT - ( 26 Feb 2019 06:01 )  PTT:42.2 sec                                           CARDIAC MARKERS ( 26 Feb 2019 06:01 )  x     / <0.01 ng/mL / 61 U/L / x     / 2.4 ng/mL  CARDIAC MARKERS ( 25 Feb 2019 11:47 )  x     / <0.01 ng/mL / 99 U/L / x     / 2.9 ng/mL                                            LIVER FUNCTIONS - ( 26 Feb 2019 06:01 )  Alb: 3.7 g/dL / Pro: 6.7 g/dL / ALK PHOS: 66 U/L / ALT: 33 U/L / AST: 24 U/L / GGT: x                                                  Culture - Blood (collected 24 Feb 2019 07:00)  Source: .Blood Blood-Peripheral  Preliminary Report (25 Feb 2019 18:01):    No growth to date.                                                    MEDICATIONS  (STANDING):  buDESOnide  80 MICROgram(s)/formoterol 4.5 MICROgram(s) Inhaler 2 Puff(s) Inhalation two times a day  chlorhexidine 4% Liquid 1 Application(s) Topical <User Schedule>  guaiFENesin  milliGRAM(s) Oral every 12 hours  influenza   Vaccine 0.5 milliLiter(s) IntraMuscular once  metoprolol tartrate 25 milliGRAM(s) Oral two times a day  mexiletine 200 milliGRAM(s) Oral two times a day  simvastatin 40 milliGRAM(s) Oral at bedtime  sodium chloride 0.9%. 500 milliLiter(s) (100 mL/Hr) IV Continuous <Continuous>    MEDICATIONS  (PRN): Patient is a 78y old  Male who presents with a chief complaint of sob and cough for 3 days (26 Feb 2019 11:44)      HPI:  78Y M with pmh of MI in 1995, systolic CHF s/p AICD, COPD, mechanical mitral valve, bioprosthetic aortic valve, melanoma and squamous cell carcinoma s/p resections presents to the ED with SOB and cough for 3 days. As per patient his wife was sick before him and for the past 3 days he noticed that he was getting more sob than usual. He also had associated cough with phlegm production which resolved about 2 days ago. The only associated complaint the patient had was tightness across the abdomen with exertion (walking fast but not while climbing stairs). Patient denies chest pain, nausea, vomiting, diaphoresis, fevers, chills or soar throat. He had an echo done about 2 months ago with his electrophysiologist. In the ED the patient has been tachycardic upto 118 and says that he also has orthopnea. Patient denies any history of Afib though it has been mentioned on his previous admissions. (23 Feb 2019 23:51), today feels better want to go home, still coughing brownish phlegm      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  No cardiovascular or pulmonary family history     Review of System:  See HPI    Allergies    No Known Allergies    Intolerances          PHYSICAL EXAM  Vital Signs Last 24 Hrs  T(C): 36.4 (26 Feb 2019 11:46), Max: 36.8 (26 Feb 2019 04:00)  T(F): 97.5 (26 Feb 2019 11:46), Max: 98.2 (26 Feb 2019 04:00)  HR: 80 (26 Feb 2019 11:46) (78 - 82)  BP: 122/74 (26 Feb 2019 11:46) (97/71 - 122/74)  BP(mean): 101 (26 Feb 2019 11:46) (71 - 101)  RR: 41 (26 Feb 2019 11:46) (17 - 41)  SpO2: 96% (26 Feb 2019 11:46) (92% - 100%)    General: In NAD  HEENT: SIGRID             Lymphatic system: No cervical LN     Lungs: Armand BS, b/l wheezing (end exp)  Cardiovascular: Regular  Gastrointestinal: Soft.  + BS   Musculoskeletal: No Clubbing.  Full range of motion.. Moves all extremities  Skin: Warm.  Intact  Neurological: No motor or sensory deficit       LABS:                          14.1   9.38  )-----------( 204      ( 26 Feb 2019 06:01 )             43.6                                               02-26    143  |  99  |  32<H>  ----------------------------<  148<H>  4.4   |  30  |  1.0    Ca    9.2      26 Feb 2019 06:01  Mg     2.3     02-26    TPro  6.7  /  Alb  3.7  /  TBili  0.6  /  DBili  x   /  AST  24  /  ALT  33  /  AlkPhos  66  02-26      PT/INR - ( 26 Feb 2019 06:01 )   PT: >40.00 sec;   INR: 4.07 ratio         PTT - ( 26 Feb 2019 06:01 )  PTT:42.2 sec                                           CARDIAC MARKERS ( 26 Feb 2019 06:01 )  x     / <0.01 ng/mL / 61 U/L / x     / 2.4 ng/mL  CARDIAC MARKERS ( 25 Feb 2019 11:47 )  x     / <0.01 ng/mL / 99 U/L / x     / 2.9 ng/mL                                            LIVER FUNCTIONS - ( 26 Feb 2019 06:01 )  Alb: 3.7 g/dL / Pro: 6.7 g/dL / ALK PHOS: 66 U/L / ALT: 33 U/L / AST: 24 U/L / GGT: x                                                  Culture - Blood (collected 24 Feb 2019 07:00)  Source: .Blood Blood-Peripheral  Preliminary Report (25 Feb 2019 18:01):    No growth to date.                                                    MEDICATIONS  (STANDING):  buDESOnide  80 MICROgram(s)/formoterol 4.5 MICROgram(s) Inhaler 2 Puff(s) Inhalation two times a day  chlorhexidine 4% Liquid 1 Application(s) Topical <User Schedule>  guaiFENesin  milliGRAM(s) Oral every 12 hours  influenza   Vaccine 0.5 milliLiter(s) IntraMuscular once  metoprolol tartrate 25 milliGRAM(s) Oral two times a day  mexiletine 200 milliGRAM(s) Oral two times a day  simvastatin 40 milliGRAM(s) Oral at bedtime  sodium chloride 0.9%. 500 milliLiter(s) (100 mL/Hr) IV Continuous <Continuous>    MEDICATIONS  (PRN):

## 2019-02-27 ENCOUNTER — TRANSCRIPTION ENCOUNTER (OUTPATIENT)
Age: 79
End: 2019-02-27

## 2019-02-27 VITALS
RESPIRATION RATE: 20 BRPM | SYSTOLIC BLOOD PRESSURE: 120 MMHG | OXYGEN SATURATION: 97 % | TEMPERATURE: 97 F | DIASTOLIC BLOOD PRESSURE: 86 MMHG | HEART RATE: 78 BPM

## 2019-02-27 LAB
ALBUMIN SERPL ELPH-MCNC: 4 G/DL — SIGNIFICANT CHANGE UP (ref 3.5–5.2)
ALP SERPL-CCNC: 76 U/L — SIGNIFICANT CHANGE UP (ref 30–115)
ALT FLD-CCNC: 44 U/L — HIGH (ref 0–41)
ANION GAP SERPL CALC-SCNC: 15 MMOL/L — HIGH (ref 7–14)
APTT BLD: 48.5 SEC — HIGH (ref 27–39.2)
AST SERPL-CCNC: 27 U/L — SIGNIFICANT CHANGE UP (ref 0–41)
BASOPHILS # BLD AUTO: 0.02 K/UL — SIGNIFICANT CHANGE UP (ref 0–0.2)
BASOPHILS NFR BLD AUTO: 0.1 % — SIGNIFICANT CHANGE UP (ref 0–1)
BILIRUB SERPL-MCNC: 0.5 MG/DL — SIGNIFICANT CHANGE UP (ref 0.2–1.2)
BUN SERPL-MCNC: 36 MG/DL — HIGH (ref 10–20)
CALCIUM SERPL-MCNC: 10.2 MG/DL — HIGH (ref 8.5–10.1)
CHLORIDE SERPL-SCNC: 100 MMOL/L — SIGNIFICANT CHANGE UP (ref 98–110)
CO2 SERPL-SCNC: 27 MMOL/L — SIGNIFICANT CHANGE UP (ref 17–32)
CREAT SERPL-MCNC: 1.3 MG/DL — SIGNIFICANT CHANGE UP (ref 0.7–1.5)
EOSINOPHIL # BLD AUTO: 0 K/UL — SIGNIFICANT CHANGE UP (ref 0–0.7)
EOSINOPHIL NFR BLD AUTO: 0 % — SIGNIFICANT CHANGE UP (ref 0–8)
GLUCOSE SERPL-MCNC: 118 MG/DL — HIGH (ref 70–99)
HCT VFR BLD CALC: 48.9 % — SIGNIFICANT CHANGE UP (ref 42–52)
HGB BLD-MCNC: 15.8 G/DL — SIGNIFICANT CHANGE UP (ref 14–18)
IMM GRANULOCYTES NFR BLD AUTO: 0.6 % — HIGH (ref 0.1–0.3)
INR BLD: 4.93 RATIO — HIGH (ref 0.65–1.3)
LYMPHOCYTES # BLD AUTO: 0.52 K/UL — LOW (ref 1.2–3.4)
LYMPHOCYTES # BLD AUTO: 2.7 % — LOW (ref 20.5–51.1)
MAGNESIUM SERPL-MCNC: 2.5 MG/DL — HIGH (ref 1.8–2.4)
MCHC RBC-ENTMCNC: 28.8 PG — SIGNIFICANT CHANGE UP (ref 27–31)
MCHC RBC-ENTMCNC: 32.3 G/DL — SIGNIFICANT CHANGE UP (ref 32–37)
MCV RBC AUTO: 89.2 FL — SIGNIFICANT CHANGE UP (ref 80–94)
MONOCYTES # BLD AUTO: 2.15 K/UL — HIGH (ref 0.1–0.6)
MONOCYTES NFR BLD AUTO: 11.1 % — HIGH (ref 1.7–9.3)
NEUTROPHILS # BLD AUTO: 16.55 K/UL — HIGH (ref 1.4–6.5)
NEUTROPHILS NFR BLD AUTO: 85.5 % — HIGH (ref 42.2–75.2)
NRBC # BLD: 0 /100 WBCS — SIGNIFICANT CHANGE UP (ref 0–0)
PLATELET # BLD AUTO: 247 K/UL — SIGNIFICANT CHANGE UP (ref 130–400)
POTASSIUM SERPL-MCNC: 4.9 MMOL/L — SIGNIFICANT CHANGE UP (ref 3.5–5)
POTASSIUM SERPL-SCNC: 4.9 MMOL/L — SIGNIFICANT CHANGE UP (ref 3.5–5)
PROT SERPL-MCNC: 7.3 G/DL — SIGNIFICANT CHANGE UP (ref 6–8)
PROTHROM AB SERPL-ACNC: >40 SEC — HIGH (ref 9.95–12.87)
RBC # BLD: 5.48 M/UL — SIGNIFICANT CHANGE UP (ref 4.7–6.1)
RBC # FLD: 12.7 % — SIGNIFICANT CHANGE UP (ref 11.5–14.5)
SODIUM SERPL-SCNC: 142 MMOL/L — SIGNIFICANT CHANGE UP (ref 135–146)
WBC # BLD: 19.36 K/UL — HIGH (ref 4.8–10.8)
WBC # FLD AUTO: 19.36 K/UL — HIGH (ref 4.8–10.8)

## 2019-02-27 RX ORDER — MEXILETINE HYDROCHLORIDE 150 MG/1
1 CAPSULE ORAL
Qty: 60 | Refills: 0 | OUTPATIENT
Start: 2019-02-27 | End: 2019-03-28

## 2019-02-27 RX ORDER — LEVALBUTEROL 1.25 MG/.5ML
2 SOLUTION, CONCENTRATE RESPIRATORY (INHALATION)
Qty: 1 | Refills: 0 | OUTPATIENT
Start: 2019-02-27 | End: 2019-03-13

## 2019-02-27 RX ORDER — AMIODARONE HYDROCHLORIDE 400 MG/1
1 TABLET ORAL
Qty: 0 | Refills: 0 | COMMUNITY

## 2019-02-27 RX ORDER — TIOTROPIUM BROMIDE 18 UG/1
1 CAPSULE ORAL; RESPIRATORY (INHALATION)
Qty: 30 | Refills: 0
Start: 2019-02-27 | End: 2019-03-28

## 2019-02-27 RX ORDER — AMIODARONE HYDROCHLORIDE 400 MG/1
1 TABLET ORAL
Qty: 30 | Refills: 0 | OUTPATIENT
Start: 2019-02-27 | End: 2019-03-28

## 2019-02-27 RX ORDER — METOPROLOL TARTRATE 50 MG
1 TABLET ORAL
Qty: 30 | Refills: 0
Start: 2019-02-27 | End: 2019-03-28

## 2019-02-27 RX ADMIN — Medication 25 MILLIGRAM(S): at 05:17

## 2019-02-27 RX ADMIN — MEXILETINE HYDROCHLORIDE 200 MILLIGRAM(S): 150 CAPSULE ORAL at 05:17

## 2019-02-27 RX ADMIN — Medication 600 MILLIGRAM(S): at 05:17

## 2019-02-27 RX ADMIN — Medication 40 MILLIGRAM(S): at 05:17

## 2019-02-27 RX ADMIN — AMIODARONE HYDROCHLORIDE 200 MILLIGRAM(S): 400 TABLET ORAL at 05:17

## 2019-02-27 NOTE — DISCHARGE NOTE ADULT - PATIENT PORTAL LINK FT
You can access the [facility] Portal, offered by [facility], by registering with the following website: [email or web address]

## 2019-02-27 NOTE — PROGRESS NOTE ADULT - ATTENDING COMMENTS
Patient is seen and examined independently. I agree with resident note above and plan of care -edited and corrected where applicable- with addition:   1. VT s/p EP readjustment of AICD settings (see above)  2. COPD exacerbation due to acute viral infection, better  3. Incidental stable thoracic aorta aneurysms and indeterminate 1.1 cm renal lesion. Patient made aware of all above and need to follow up on routine basis with Primary care Physician regarding MRI Follow up. Radiological report made available to patient.        Patient to Follow up with Primary care Physician in one week and with cardiology and pulm (Dr Bernal).  Case discussed with resident assigned.

## 2019-02-27 NOTE — DISCHARGE NOTE ADULT - MEDICATION SUMMARY - MEDICATIONS TO CHANGE
I will SWITCH the dose or number of times a day I take the medications listed below when I get home from the hospital:    amiodarone 200 mg oral tablet  -- 1 tab(s) by mouth once a day I will SWITCH the dose or number of times a day I take the medications listed below when I get home from the hospital:  None

## 2019-02-27 NOTE — CHART NOTE - NSCHARTNOTEFT_GEN_A_CORE
<<<RESIDENT DISCHARGE NOTE>>>     YONATHAN FAGAN  MRN-[id number]    VITAL SIGNS:  T(F): 97 (02-27-19 @ 14:46), Max: 98 (02-26-19 @ 19:22)  HR: 78 (02-27-19 @ 14:46)  BP: 120/86 (02-27-19 @ 14:46)  SpO2: 97% (02-27-19 @ 14:46)      PHYSICAL EXAMINATION:      TEST RESULTS:                        15.8   19.36 )-----------( 247      ( 27 Feb 2019 05:10 )             48.9       02-27    142  |  100  |  36<H>  ----------------------------<  118<H>  4.9   |  27  |  1.3    Ca    10.2<H>      27 Feb 2019 05:10  Mg     2.5     02-27    TPro  7.3  /  Alb  4.0  /  TBili  0.5  /  DBili  x   /  AST  27  /  ALT  44<H>  /  AlkPhos  76  02-27      FINAL DISCHARGE INTERVIEW:  Resident(s) Present: (Name: Ly), RN Present: (Name: Rosemarie )    DISCHARGE MEDICATION RECONCILIATION  reviewed with Attending (Name:Dr. Cutler)    DISPOSITION:   [ X ] Home,    [  ] Home with Visiting Nursing Services,   [    ]  SNF/ NH,    [   ] Acute Rehab (4A),   [   ] Other (Specify:_________)

## 2019-02-27 NOTE — DISCHARGE NOTE ADULT - CARE PROVIDER_API CALL
Kamryn Fraser)  Cardiology; Internal Medicine  [address] [place] [address], [address]  [place] [address]  Phone: [phone]  Fax: [phone]  Follow Up Time:     Eran Bernal)  Critical Care Medicine; Geriatric Medicine; Internal Medicine; Pulmonary Disease  [address] [place] [address], [address]  [place] [address]  Phone: [phone]  Fax: [phone]  Follow Up Time:

## 2019-02-27 NOTE — DISCHARGE NOTE ADULT - CARE PLAN
Principal Discharge DX:	Ventricular tachycardia  Goal:	Optimize the patient  Assessment and plan of treatment:	Evaluated for CAD. Found to have V.tach on admission. Loaded with amiodarone and lidocaine. No being discharge on amiodarone and mexilitine. EP interrogated the AICD. Follow up with EP outpatient.  CT angiogram showed thoracic and abdominal aneurysm. Incidental findings right renal lesion  recommend CT renal or MRI in 3-6 months. follow up with PCP. You also have elevated INR. Follow up with repeat blood test for INR before resuming Coumadin.  Secondary Diagnosis:	Chronic obstructive pulmonary disease, unspecified COPD type  Assessment and plan of treatment:	Short course of prednisone and prophylactic antibiotics.

## 2019-02-27 NOTE — PROGRESS NOTE ADULT - REASON FOR ADMISSION
sob and cough for 3 days

## 2019-02-27 NOTE — PROGRESS NOTE ADULT - SUBJECTIVE AND OBJECTIVE BOX
NO RECURRENCE OF V.TACHYCARDIA  PACING %  LUNG EXAM : STILL LYUBOV . RONCI AND DECREASED AIR ENTRY  PT INSISTS ON GOING HOME  WILL CONSULT WITH PULMONARY   SERVICE

## 2019-02-27 NOTE — DISCHARGE NOTE ADULT - HOSPITAL COURSE
78 year old with PMH of MI in 1995, systolic CHF s/p AICD, COPD on home oxygen, (rheumatic heart disease), bioprosthetic aortic valve, melanoma and squamous cell carcinoma s/p resections presents to the ED with SOB and cough for 3 days.  In ED, pt had CT chest with IV contrast showing ascending thoracic aortic aneurysm but no aortic dissection.  CXR negative for effusion.  Cardiac enzymes have been negative x 3.  His EKG showed wide complex v tach at HR of 113.  EP was consulted for device interrogation and pt was found to have episodes of slow Vtach.  Pt had RVP performed, negative for influenza but positive for Rhinovirus (enterovirus).  He was loaded with lidocaine and now on amiodarone and mexelitine.He was found to have EF 25 to 30 %  For COPD exacerbation likely due to entero/rhinovirus the patient is being discharged on Prednisone 40 mg PO x 5 days than 20 mg for 5 days xopenex as needed, doxy 100 mg twice daily for 7 days and Tiotropium once a day.

## 2019-02-27 NOTE — DISCHARGE NOTE ADULT - MEDICATION SUMMARY - MEDICATIONS TO TAKE
I will START or STAY ON the medications listed below when I get home from the hospital:    predniSONE 20 mg oral tablet  -- 2 tab(s) by mouth once a day for 4 days then.  1 Tab by mouth once a day for 5 days and stop.  -- Indication: For COPD exacerbation    amiodarone 200 mg oral tablet  -- 1 tab(s) by mouth once a day  -- Indication: For V. Tach    mexiletine 200 mg oral capsule  -- 1 cap(s) by mouth 2 times a day  -- Indication: For V. tach    Coumadin 7.5 mg oral tablet  -- 1 tab(s) by mouth once a day  -- Indication: For MItral Valve    simvastatin 40 mg oral tablet  -- 1 tab(s) by mouth once a day (at bedtime)  -- Indication: For Prophylaxis    doxycycline hyclate 100 mg oral capsule  -- 1 cap(s) by mouth 2 times a day   -- Avoid prolonged or excessive exposure to direct and/or artificial sunlight while taking this medication.  Do not take this drug if you are pregnant.  Finish all this medication unless otherwise directed by prescriber.  Medication should be taken with plenty of water.    -- Indication: For COPD exacerbatyion    metoprolol succinate 25 mg oral capsule, extended release  -- 1 cap(s) by mouth once a day   -- Do not drink alcoholic beverages when taking this medication.  It is very important that you take or use this exactly as directed.  Do not skip doses or discontinue unless directed by your doctor.  May cause drowsiness or dizziness.  Obtain medical advice before taking any non-prescription drugs as some may affect the action of this medication.  Swallow whole.  Do not crush.  This drug may impair the ability to drive or operate machinery.  Use care until you become familiar with its effects.    -- Indication: For Beta Blocker    tiotropium 18 mcg inhalation capsule  -- 1 cap(s) inhaled once a day  -- Indication: For COPD exacerbation    Xopenex HFA 45 mcg/inh inhalation aerosol  -- 2 puff(s) inhaled 2 times a day   -- For inhalation only.  Shake well before use.    -- Indication: For COPD exacerbation    Symbicort 80 mcg-4.5 mcg/inh inhalation aerosol  -- 2 puff(s) inhaled 2 times a day, As Needed  -- Indication: For COPD exacerbation    Incruse Ellipta 62.5 mcg/inh inhalation powder  -- 1 puff(s) inhaled once a day  -- Indication: For COPD excacerbation    Lasix 40 mg oral tablet  -- 1 tab(s) by mouth once a day  -- Indication: For Diuretic    dicyclomine 10 mg oral capsule  -- 1 cap(s) by mouth once a day  -- Indication: For Irritable Bowel Syndrome

## 2019-02-27 NOTE — DISCHARGE NOTE ADULT - PLAN OF CARE
Optimize the patient Evaluated for CAD. Found to have V.tach on admission. Loaded with amiodarone and lidocaine. No being discharge on amiodarone and mexilitine. EP interrogated the AICD. Follow up with EP outpatient.  CT angiogram showed thoracic and abdominal aneurysm. Incidental findings right renal lesion  recommend CT renal or MRI in 3-6 months. follow up with PCP. You also have elevated INR. Follow up with repeat blood test for INR before resuming Coumadin. Short course of prednisone and prophylactic antibiotics.

## 2019-02-28 LAB — LIDOCAIN SERPL-MCNC: 5.2 MCG/ML — HIGH (ref 1.5–5)

## 2019-03-01 ENCOUNTER — OUTPATIENT (OUTPATIENT)
Dept: OUTPATIENT SERVICES | Facility: HOSPITAL | Age: 79
LOS: 1 days | Discharge: HOME | End: 2019-03-01

## 2019-03-01 DIAGNOSIS — S68.119A COMPLETE TRAUMATIC METACARPOPHALANGEAL AMPUTATION OF UNSPECIFIED FINGER, INITIAL ENCOUNTER: Chronic | ICD-10-CM

## 2019-03-01 DIAGNOSIS — K40.90 UNILATERAL INGUINAL HERNIA, WITHOUT OBSTRUCTION OR GANGRENE, NOT SPECIFIED AS RECURRENT: Chronic | ICD-10-CM

## 2019-03-01 DIAGNOSIS — Z95.2 PRESENCE OF PROSTHETIC HEART VALVE: ICD-10-CM

## 2019-03-01 DIAGNOSIS — Z98.890 OTHER SPECIFIED POSTPROCEDURAL STATES: Chronic | ICD-10-CM

## 2019-03-01 DIAGNOSIS — Z95.1 PRESENCE OF AORTOCORONARY BYPASS GRAFT: Chronic | ICD-10-CM

## 2019-03-01 DIAGNOSIS — Z79.01 LONG TERM (CURRENT) USE OF ANTICOAGULANTS: ICD-10-CM

## 2019-03-01 LAB
CULTURE RESULTS: SIGNIFICANT CHANGE UP
POCT INR: 2.1 RATIO — HIGH (ref 0.9–1.2)
POCT PT: 25.8 SEC — HIGH (ref 10–13.4)
SPECIMEN SOURCE: SIGNIFICANT CHANGE UP

## 2019-03-02 LAB
CULTURE RESULTS: SIGNIFICANT CHANGE UP
SPECIMEN SOURCE: SIGNIFICANT CHANGE UP

## 2019-03-05 ENCOUNTER — OUTPATIENT (OUTPATIENT)
Dept: OUTPATIENT SERVICES | Facility: HOSPITAL | Age: 79
LOS: 1 days | Discharge: HOME | End: 2019-03-05

## 2019-03-05 DIAGNOSIS — Z98.890 OTHER SPECIFIED POSTPROCEDURAL STATES: Chronic | ICD-10-CM

## 2019-03-05 DIAGNOSIS — K40.90 UNILATERAL INGUINAL HERNIA, WITHOUT OBSTRUCTION OR GANGRENE, NOT SPECIFIED AS RECURRENT: Chronic | ICD-10-CM

## 2019-03-05 DIAGNOSIS — Z95.2 PRESENCE OF PROSTHETIC HEART VALVE: ICD-10-CM

## 2019-03-05 DIAGNOSIS — S68.119A COMPLETE TRAUMATIC METACARPOPHALANGEAL AMPUTATION OF UNSPECIFIED FINGER, INITIAL ENCOUNTER: Chronic | ICD-10-CM

## 2019-03-05 DIAGNOSIS — Z95.1 PRESENCE OF AORTOCORONARY BYPASS GRAFT: Chronic | ICD-10-CM

## 2019-03-05 DIAGNOSIS — Z79.01 LONG TERM (CURRENT) USE OF ANTICOAGULANTS: ICD-10-CM

## 2019-03-05 LAB
POCT INR: 4.7 RATIO — HIGH (ref 0.9–1.2)
POCT PT: 56 SEC — HIGH (ref 10–13.4)

## 2019-03-06 DIAGNOSIS — Z90.49 ACQUIRED ABSENCE OF OTHER SPECIFIED PARTS OF DIGESTIVE TRACT: ICD-10-CM

## 2019-03-06 DIAGNOSIS — R00.0 TACHYCARDIA, UNSPECIFIED: ICD-10-CM

## 2019-03-06 DIAGNOSIS — B97.19 OTHER ENTEROVIRUS AS THE CAUSE OF DISEASES CLASSIFIED ELSEWHERE: ICD-10-CM

## 2019-03-06 DIAGNOSIS — R07.9 CHEST PAIN, UNSPECIFIED: ICD-10-CM

## 2019-03-06 DIAGNOSIS — J44.0 CHRONIC OBSTRUCTIVE PULMONARY DISEASE WITH (ACUTE) LOWER RESPIRATORY INFECTION: ICD-10-CM

## 2019-03-06 DIAGNOSIS — J44.1 CHRONIC OBSTRUCTIVE PULMONARY DISEASE WITH (ACUTE) EXACERBATION: ICD-10-CM

## 2019-03-06 DIAGNOSIS — Z95.1 PRESENCE OF AORTOCORONARY BYPASS GRAFT: ICD-10-CM

## 2019-03-06 DIAGNOSIS — N28.89 OTHER SPECIFIED DISORDERS OF KIDNEY AND URETER: ICD-10-CM

## 2019-03-06 DIAGNOSIS — E78.5 HYPERLIPIDEMIA, UNSPECIFIED: ICD-10-CM

## 2019-03-06 DIAGNOSIS — Z79.01 LONG TERM (CURRENT) USE OF ANTICOAGULANTS: ICD-10-CM

## 2019-03-06 DIAGNOSIS — I48.0 PAROXYSMAL ATRIAL FIBRILLATION: ICD-10-CM

## 2019-03-06 DIAGNOSIS — T48.6X5A ADVERSE EFFECT OF ANTIASTHMATICS, INITIAL ENCOUNTER: ICD-10-CM

## 2019-03-06 DIAGNOSIS — I25.5 ISCHEMIC CARDIOMYOPATHY: ICD-10-CM

## 2019-03-06 DIAGNOSIS — Z86.74 PERSONAL HISTORY OF SUDDEN CARDIAC ARREST: ICD-10-CM

## 2019-03-06 DIAGNOSIS — Z82.49 FAMILY HISTORY OF ISCHEMIC HEART DISEASE AND OTHER DISEASES OF THE CIRCULATORY SYSTEM: ICD-10-CM

## 2019-03-06 DIAGNOSIS — K58.9 IRRITABLE BOWEL SYNDROME WITHOUT DIARRHEA: ICD-10-CM

## 2019-03-06 DIAGNOSIS — Z99.81 DEPENDENCE ON SUPPLEMENTAL OXYGEN: ICD-10-CM

## 2019-03-06 DIAGNOSIS — B34.9 VIRAL INFECTION, UNSPECIFIED: ICD-10-CM

## 2019-03-06 DIAGNOSIS — I50.23 ACUTE ON CHRONIC SYSTOLIC (CONGESTIVE) HEART FAILURE: ICD-10-CM

## 2019-03-06 DIAGNOSIS — Z95.810 PRESENCE OF AUTOMATIC (IMPLANTABLE) CARDIAC DEFIBRILLATOR: ICD-10-CM

## 2019-03-06 DIAGNOSIS — Z95.2 PRESENCE OF PROSTHETIC HEART VALVE: ICD-10-CM

## 2019-03-06 DIAGNOSIS — Z66 DO NOT RESUSCITATE: ICD-10-CM

## 2019-03-06 DIAGNOSIS — M19.90 UNSPECIFIED OSTEOARTHRITIS, UNSPECIFIED SITE: ICD-10-CM

## 2019-03-06 DIAGNOSIS — Z89.022 ACQUIRED ABSENCE OF LEFT FINGER(S): ICD-10-CM

## 2019-03-06 DIAGNOSIS — R79.1 ABNORMAL COAGULATION PROFILE: ICD-10-CM

## 2019-03-06 DIAGNOSIS — I25.10 ATHEROSCLEROTIC HEART DISEASE OF NATIVE CORONARY ARTERY WITHOUT ANGINA PECTORIS: ICD-10-CM

## 2019-03-06 DIAGNOSIS — I25.2 OLD MYOCARDIAL INFARCTION: ICD-10-CM

## 2019-03-06 DIAGNOSIS — I71.2 THORACIC AORTIC ANEURYSM, WITHOUT RUPTURE: ICD-10-CM

## 2019-03-06 DIAGNOSIS — I47.2 VENTRICULAR TACHYCARDIA: ICD-10-CM

## 2019-03-06 DIAGNOSIS — Z85.828 PERSONAL HISTORY OF OTHER MALIGNANT NEOPLASM OF SKIN: ICD-10-CM

## 2019-03-06 DIAGNOSIS — Z87.891 PERSONAL HISTORY OF NICOTINE DEPENDENCE: ICD-10-CM

## 2019-03-08 ENCOUNTER — OUTPATIENT (OUTPATIENT)
Dept: OUTPATIENT SERVICES | Facility: HOSPITAL | Age: 79
LOS: 1 days | Discharge: HOME | End: 2019-03-08

## 2019-03-08 DIAGNOSIS — Z79.01 LONG TERM (CURRENT) USE OF ANTICOAGULANTS: ICD-10-CM

## 2019-03-08 DIAGNOSIS — K40.90 UNILATERAL INGUINAL HERNIA, WITHOUT OBSTRUCTION OR GANGRENE, NOT SPECIFIED AS RECURRENT: Chronic | ICD-10-CM

## 2019-03-08 DIAGNOSIS — Z95.1 PRESENCE OF AORTOCORONARY BYPASS GRAFT: Chronic | ICD-10-CM

## 2019-03-08 DIAGNOSIS — Z98.890 OTHER SPECIFIED POSTPROCEDURAL STATES: Chronic | ICD-10-CM

## 2019-03-08 DIAGNOSIS — S68.119A COMPLETE TRAUMATIC METACARPOPHALANGEAL AMPUTATION OF UNSPECIFIED FINGER, INITIAL ENCOUNTER: Chronic | ICD-10-CM

## 2019-03-08 DIAGNOSIS — Z95.2 PRESENCE OF PROSTHETIC HEART VALVE: ICD-10-CM

## 2019-03-08 LAB
POCT INR: 2.8 RATIO — HIGH (ref 0.9–1.2)
POCT PT: 34.2 SEC — HIGH (ref 10–13.4)

## 2019-03-12 ENCOUNTER — OUTPATIENT (OUTPATIENT)
Dept: OUTPATIENT SERVICES | Facility: HOSPITAL | Age: 79
LOS: 1 days | Discharge: HOME | End: 2019-03-12

## 2019-03-12 DIAGNOSIS — Z95.2 PRESENCE OF PROSTHETIC HEART VALVE: ICD-10-CM

## 2019-03-12 DIAGNOSIS — Z98.890 OTHER SPECIFIED POSTPROCEDURAL STATES: Chronic | ICD-10-CM

## 2019-03-12 DIAGNOSIS — Z79.01 LONG TERM (CURRENT) USE OF ANTICOAGULANTS: ICD-10-CM

## 2019-03-12 DIAGNOSIS — S68.119A COMPLETE TRAUMATIC METACARPOPHALANGEAL AMPUTATION OF UNSPECIFIED FINGER, INITIAL ENCOUNTER: Chronic | ICD-10-CM

## 2019-03-12 DIAGNOSIS — K40.90 UNILATERAL INGUINAL HERNIA, WITHOUT OBSTRUCTION OR GANGRENE, NOT SPECIFIED AS RECURRENT: Chronic | ICD-10-CM

## 2019-03-12 DIAGNOSIS — Z95.1 PRESENCE OF AORTOCORONARY BYPASS GRAFT: Chronic | ICD-10-CM

## 2019-03-12 LAB
POCT INR: 3.4 RATIO — HIGH (ref 0.9–1.2)
POCT PT: 40.5 SEC — HIGH (ref 10–13.4)

## 2019-03-18 ENCOUNTER — OUTPATIENT (OUTPATIENT)
Dept: OUTPATIENT SERVICES | Facility: HOSPITAL | Age: 79
LOS: 1 days | Discharge: HOME | End: 2019-03-18

## 2019-03-18 DIAGNOSIS — Z79.01 LONG TERM (CURRENT) USE OF ANTICOAGULANTS: ICD-10-CM

## 2019-03-18 DIAGNOSIS — Z98.890 OTHER SPECIFIED POSTPROCEDURAL STATES: Chronic | ICD-10-CM

## 2019-03-18 DIAGNOSIS — Z95.1 PRESENCE OF AORTOCORONARY BYPASS GRAFT: Chronic | ICD-10-CM

## 2019-03-18 DIAGNOSIS — Z95.2 PRESENCE OF PROSTHETIC HEART VALVE: ICD-10-CM

## 2019-03-18 DIAGNOSIS — S68.119A COMPLETE TRAUMATIC METACARPOPHALANGEAL AMPUTATION OF UNSPECIFIED FINGER, INITIAL ENCOUNTER: Chronic | ICD-10-CM

## 2019-03-18 DIAGNOSIS — K40.90 UNILATERAL INGUINAL HERNIA, WITHOUT OBSTRUCTION OR GANGRENE, NOT SPECIFIED AS RECURRENT: Chronic | ICD-10-CM

## 2019-03-18 LAB
POCT INR: 2.3 RATIO — HIGH (ref 0.9–1.2)
POCT PT: 27.5 SEC — HIGH (ref 10–13.4)

## 2019-03-25 ENCOUNTER — INPATIENT (INPATIENT)
Facility: HOSPITAL | Age: 79
LOS: 1 days | Discharge: HOME | End: 2019-03-27
Attending: INTERNAL MEDICINE | Admitting: INTERNAL MEDICINE

## 2019-03-25 VITALS
HEART RATE: 78 BPM | TEMPERATURE: 97 F | DIASTOLIC BLOOD PRESSURE: 67 MMHG | SYSTOLIC BLOOD PRESSURE: 131 MMHG | OXYGEN SATURATION: 99 % | RESPIRATION RATE: 20 BRPM

## 2019-03-25 DIAGNOSIS — K40.90 UNILATERAL INGUINAL HERNIA, WITHOUT OBSTRUCTION OR GANGRENE, NOT SPECIFIED AS RECURRENT: Chronic | ICD-10-CM

## 2019-03-25 DIAGNOSIS — S68.119A COMPLETE TRAUMATIC METACARPOPHALANGEAL AMPUTATION OF UNSPECIFIED FINGER, INITIAL ENCOUNTER: Chronic | ICD-10-CM

## 2019-03-25 DIAGNOSIS — Z95.1 PRESENCE OF AORTOCORONARY BYPASS GRAFT: Chronic | ICD-10-CM

## 2019-03-25 DIAGNOSIS — Z98.890 OTHER SPECIFIED POSTPROCEDURAL STATES: Chronic | ICD-10-CM

## 2019-03-25 LAB
ALBUMIN SERPL ELPH-MCNC: 4 G/DL — SIGNIFICANT CHANGE UP (ref 3.5–5.2)
ALP SERPL-CCNC: 93 U/L — SIGNIFICANT CHANGE UP (ref 30–115)
ALT FLD-CCNC: 56 U/L — HIGH (ref 0–41)
ANION GAP SERPL CALC-SCNC: 11 MMOL/L — SIGNIFICANT CHANGE UP (ref 7–14)
ANION GAP SERPL CALC-SCNC: 12 MMOL/L — SIGNIFICANT CHANGE UP (ref 7–14)
APTT BLD: 31 SEC — SIGNIFICANT CHANGE UP (ref 27–39.2)
AST SERPL-CCNC: 42 U/L — HIGH (ref 0–41)
BASE EXCESS BLDV CALC-SCNC: 9.9 MMOL/L — HIGH (ref -2–2)
BILIRUB SERPL-MCNC: 0.3 MG/DL — SIGNIFICANT CHANGE UP (ref 0.2–1.2)
BUN SERPL-MCNC: 29 MG/DL — HIGH (ref 10–20)
BUN SERPL-MCNC: 31 MG/DL — HIGH (ref 10–20)
CA-I SERPL-SCNC: 1.21 MMOL/L — SIGNIFICANT CHANGE UP (ref 1.12–1.3)
CALCIUM SERPL-MCNC: 9.1 MG/DL — SIGNIFICANT CHANGE UP (ref 8.5–10.1)
CALCIUM SERPL-MCNC: 9.7 MG/DL — SIGNIFICANT CHANGE UP (ref 8.5–10.1)
CHLORIDE SERPL-SCNC: 93 MMOL/L — LOW (ref 98–110)
CHLORIDE SERPL-SCNC: 96 MMOL/L — LOW (ref 98–110)
CO2 SERPL-SCNC: 33 MMOL/L — HIGH (ref 17–32)
CO2 SERPL-SCNC: 33 MMOL/L — HIGH (ref 17–32)
CREAT SERPL-MCNC: 1.3 MG/DL — SIGNIFICANT CHANGE UP (ref 0.7–1.5)
CREAT SERPL-MCNC: 1.5 MG/DL — SIGNIFICANT CHANGE UP (ref 0.7–1.5)
GAS PNL BLDV: 140 MMOL/L — SIGNIFICANT CHANGE UP (ref 136–145)
GAS PNL BLDV: SIGNIFICANT CHANGE UP
GLUCOSE BLDC GLUCOMTR-MCNC: 109 MG/DL — HIGH (ref 70–99)
GLUCOSE SERPL-MCNC: 102 MG/DL — HIGH (ref 70–99)
GLUCOSE SERPL-MCNC: 118 MG/DL — HIGH (ref 70–99)
HCO3 BLDV-SCNC: 39 MMOL/L — HIGH (ref 22–29)
HCT VFR BLD CALC: 52 % — SIGNIFICANT CHANGE UP (ref 42–52)
HCT VFR BLDA CALC: 53.9 % — HIGH (ref 34–44)
HGB BLD CALC-MCNC: 17.6 G/DL — SIGNIFICANT CHANGE UP (ref 14–18)
HGB BLD-MCNC: 16.6 G/DL — SIGNIFICANT CHANGE UP (ref 14–18)
INR BLD: 1.33 RATIO — HIGH (ref 0.65–1.3)
LACTATE BLDV-MCNC: 2 MMOL/L — HIGH (ref 0.5–1.6)
MAGNESIUM SERPL-MCNC: 2 MG/DL — SIGNIFICANT CHANGE UP (ref 1.8–2.4)
MAGNESIUM SERPL-MCNC: 2.1 MG/DL — SIGNIFICANT CHANGE UP (ref 1.8–2.4)
MCHC RBC-ENTMCNC: 28 PG — SIGNIFICANT CHANGE UP (ref 27–31)
MCHC RBC-ENTMCNC: 31.9 G/DL — LOW (ref 32–37)
MCV RBC AUTO: 87.7 FL — SIGNIFICANT CHANGE UP (ref 80–94)
NRBC # BLD: 0 /100 WBCS — SIGNIFICANT CHANGE UP (ref 0–0)
NT-PROBNP SERPL-SCNC: 2206 PG/ML — HIGH (ref 0–300)
PCO2 BLDV: 65 MMHG — HIGH (ref 41–51)
PH BLDV: 7.38 — SIGNIFICANT CHANGE UP (ref 7.26–7.43)
PLATELET # BLD AUTO: 161 K/UL — SIGNIFICANT CHANGE UP (ref 130–400)
PO2 BLDV: 16 MMHG — LOW (ref 20–40)
POTASSIUM BLDV-SCNC: 3.9 MMOL/L — SIGNIFICANT CHANGE UP (ref 3.3–5.6)
POTASSIUM SERPL-MCNC: 3.2 MMOL/L — LOW (ref 3.5–5)
POTASSIUM SERPL-MCNC: 4.4 MMOL/L — SIGNIFICANT CHANGE UP (ref 3.5–5)
POTASSIUM SERPL-SCNC: 3.2 MMOL/L — LOW (ref 3.5–5)
POTASSIUM SERPL-SCNC: 4.4 MMOL/L — SIGNIFICANT CHANGE UP (ref 3.5–5)
PROT SERPL-MCNC: 7.2 G/DL — SIGNIFICANT CHANGE UP (ref 6–8)
PROTHROM AB SERPL-ACNC: 15.2 SEC — HIGH (ref 9.95–12.87)
RBC # BLD: 5.93 M/UL — SIGNIFICANT CHANGE UP (ref 4.7–6.1)
RBC # FLD: 13 % — SIGNIFICANT CHANGE UP (ref 11.5–14.5)
SAO2 % BLDV: 18 % — SIGNIFICANT CHANGE UP
SODIUM SERPL-SCNC: 138 MMOL/L — SIGNIFICANT CHANGE UP (ref 135–146)
SODIUM SERPL-SCNC: 140 MMOL/L — SIGNIFICANT CHANGE UP (ref 135–146)
TROPONIN T SERPL-MCNC: 0.01 NG/ML — SIGNIFICANT CHANGE UP
WBC # BLD: 11.09 K/UL — HIGH (ref 4.8–10.8)
WBC # FLD AUTO: 11.09 K/UL — HIGH (ref 4.8–10.8)

## 2019-03-25 RX ORDER — POTASSIUM CHLORIDE 20 MEQ
20 PACKET (EA) ORAL
Qty: 0 | Refills: 0 | Status: COMPLETED | OUTPATIENT
Start: 2019-03-25 | End: 2019-03-26

## 2019-03-25 RX ORDER — TIOTROPIUM BROMIDE 18 UG/1
1 CAPSULE ORAL; RESPIRATORY (INHALATION) DAILY
Qty: 0 | Refills: 0 | Status: DISCONTINUED | OUTPATIENT
Start: 2019-03-25 | End: 2019-03-27

## 2019-03-25 RX ORDER — SIMVASTATIN 20 MG/1
40 TABLET, FILM COATED ORAL AT BEDTIME
Qty: 0 | Refills: 0 | Status: DISCONTINUED | OUTPATIENT
Start: 2019-03-25 | End: 2019-03-25

## 2019-03-25 RX ORDER — AMIODARONE HYDROCHLORIDE 400 MG/1
150 TABLET ORAL ONCE
Qty: 0 | Refills: 0 | Status: COMPLETED | OUTPATIENT
Start: 2019-03-25 | End: 2019-03-25

## 2019-03-25 RX ORDER — BUDESONIDE AND FORMOTEROL FUMARATE DIHYDRATE 160; 4.5 UG/1; UG/1
2 AEROSOL RESPIRATORY (INHALATION)
Qty: 0 | Refills: 0 | Status: DISCONTINUED | OUTPATIENT
Start: 2019-03-25 | End: 2019-03-27

## 2019-03-25 RX ORDER — AMIODARONE HYDROCHLORIDE 400 MG/1
1 TABLET ORAL
Qty: 900 | Refills: 0 | Status: DISCONTINUED | OUTPATIENT
Start: 2019-03-25 | End: 2019-03-26

## 2019-03-25 RX ORDER — METOPROLOL TARTRATE 50 MG
25 TABLET ORAL DAILY
Qty: 0 | Refills: 0 | Status: DISCONTINUED | OUTPATIENT
Start: 2019-03-25 | End: 2019-03-27

## 2019-03-25 RX ORDER — SPIRONOLACTONE 25 MG/1
12.5 TABLET, FILM COATED ORAL DAILY
Qty: 0 | Refills: 0 | Status: DISCONTINUED | OUTPATIENT
Start: 2019-03-25 | End: 2019-03-27

## 2019-03-25 RX ORDER — MEXILETINE HYDROCHLORIDE 150 MG/1
200 CAPSULE ORAL
Qty: 0 | Refills: 0 | Status: DISCONTINUED | OUTPATIENT
Start: 2019-03-25 | End: 2019-03-27

## 2019-03-25 RX ORDER — SIMVASTATIN 20 MG/1
1 TABLET, FILM COATED ORAL
Qty: 0 | Refills: 0 | COMMUNITY

## 2019-03-25 RX ORDER — AMIODARONE HYDROCHLORIDE 400 MG/1
0.5 TABLET ORAL
Qty: 900 | Refills: 0 | Status: DISCONTINUED | OUTPATIENT
Start: 2019-03-25 | End: 2019-03-26

## 2019-03-25 RX ORDER — ENOXAPARIN SODIUM 100 MG/ML
60 INJECTION SUBCUTANEOUS EVERY 12 HOURS
Qty: 0 | Refills: 0 | Status: DISCONTINUED | OUTPATIENT
Start: 2019-03-25 | End: 2019-03-27

## 2019-03-25 RX ORDER — ATORVASTATIN CALCIUM 80 MG/1
40 TABLET, FILM COATED ORAL AT BEDTIME
Qty: 0 | Refills: 0 | Status: DISCONTINUED | OUTPATIENT
Start: 2019-03-25 | End: 2019-03-27

## 2019-03-25 RX ORDER — FUROSEMIDE 40 MG
1 TABLET ORAL
Qty: 0 | Refills: 0 | COMMUNITY

## 2019-03-25 RX ORDER — WARFARIN SODIUM 2.5 MG/1
7.5 TABLET ORAL ONCE
Qty: 0 | Refills: 0 | Status: COMPLETED | OUTPATIENT
Start: 2019-03-25 | End: 2019-03-25

## 2019-03-25 RX ADMIN — WARFARIN SODIUM 7.5 MILLIGRAM(S): 2.5 TABLET ORAL at 21:12

## 2019-03-25 RX ADMIN — ATORVASTATIN CALCIUM 40 MILLIGRAM(S): 80 TABLET, FILM COATED ORAL at 21:12

## 2019-03-25 RX ADMIN — BUDESONIDE AND FORMOTEROL FUMARATE DIHYDRATE 2 PUFF(S): 160; 4.5 AEROSOL RESPIRATORY (INHALATION) at 19:17

## 2019-03-25 RX ADMIN — MEXILETINE HYDROCHLORIDE 200 MILLIGRAM(S): 150 CAPSULE ORAL at 18:15

## 2019-03-25 RX ADMIN — AMIODARONE HYDROCHLORIDE 33.33 MG/MIN: 400 TABLET ORAL at 09:23

## 2019-03-25 RX ADMIN — AMIODARONE HYDROCHLORIDE 16.67 MG/MIN: 400 TABLET ORAL at 15:13

## 2019-03-25 RX ADMIN — AMIODARONE HYDROCHLORIDE 618 MILLIGRAM(S): 400 TABLET ORAL at 09:04

## 2019-03-25 RX ADMIN — ENOXAPARIN SODIUM 60 MILLIGRAM(S): 100 INJECTION SUBCUTANEOUS at 17:37

## 2019-03-25 RX ADMIN — Medication 10 MILLIGRAM(S): at 13:42

## 2019-03-25 NOTE — ED ADULT TRIAGE NOTE - CHIEF COMPLAINT QUOTE
pt reports his defibrillator went 2x, c.o. chest pain only at defibrillator site. pt denies SOB. pt reports his defibrillator fired 2x, c.o. chest pain only at defibrillator site. pt denies SOB.

## 2019-03-25 NOTE — ED PROVIDER NOTE - OBJECTIVE STATEMENT
77yo M hx HTN DL MI CHF AICD AVR AFib on Coumadin COPD pw AICD firing- fired once on Fri, then once this AM which woke him up out of his sleep- c/o chest soreness but otherwise no complaints- no recent illness no f/c/n/v/d, shortness of breath, abdominal pain, numbness/focal weakness, back pain, dysuria/hematuria, cough

## 2019-03-25 NOTE — H&P ADULT - NSICDXPASTMEDICALHX_GEN_ALL_CORE_FT
PAST MEDICAL HISTORY:  CAD (coronary artery disease)     Cancer Basal Cell / Squamous Cell    Chronic obstructive pulmonary disease, unspecified COPD type     Chronic systolic congestive heart failure     ROMERO (dyspnea on exertion)     Former smoker, stopped smoking many years ago     Hyperlipidemia, unspecified hyperlipidemia type     ICD (implantable cardioverter-defibrillator) in place     MI, old MI / Cardiac Arrest 1995    Osteoarthritis     Other irritable bowel syndrome

## 2019-03-25 NOTE — H&P ADULT - HISTORY OF PRESENT ILLNESS
78 year old with PMH of MI in 1995, systolic CHF s/p AICD, COPD on home oxygen, (rheumatic heart disease), bioprosthetic aortic valve, melanoma and squamous cell carcinoma s/p resections presents to the ED for above CC during which the patient's ICD had fired twice, The first time was in his sleep on Friday night and he felt fine afterwards so he went back to bed. Second episode was last night and patient states that this one felt stronger than the one prior. He feels somewhat sore after the device shocked him. No exertional symptoms and no preceding symptoms to either shock. Only common thread was that he was asleep for both episodes. Patient denies any fever or chills , no SOB, no chest pain, no palpitation, o diaphoresis, mentioned that he is complaint with his medications. 78 year old with PMH of MI in 1995, systolic CHF s/p AICD, COPD on home oxygen, (rheumatic heart disease), bioprosthetic aortic valve, melanoma and squamous cell carcinoma s/p resections presents to the ED for above CC during which the patient's ICD had fired twice. The first time was in his sleep on Friday night and he felt fine afterwards so he went back to bed. Second episode was last night and patient states that this one felt stronger than the one prior. He feels somewhat sore after the device shocked him. No exertional symptoms and no preceding symptoms to either shock. Only common thread was that he was asleep for both episodes. Patient denies any fever or chills , no SOB, no chest pain, no palpitation, or diaphoresis. He mentioned that he is complaint with his medications.

## 2019-03-25 NOTE — H&P ADULT - NSHPLABSRESULTS_GEN_ALL_CORE
16.6   11.09 )-----------( 161      ( 25 Mar 2019 05:13 )             52.0       03-25    138  |  93<L>  |  31<H>  ----------------------------<  118<H>  4.4   |  33<H>  |  1.5    Ca    9.7      25 Mar 2019 05:13  Mg     2.0     03-25    TPro  7.2  /  Alb  4.0  /  TBili  0.3  /  DBili  x   /  AST  42<H>  /  ALT  56<H>  /  AlkPhos  93  03-25          PT/INR - ( 25 Mar 2019 05:13 )   PT: 15.20 sec;   INR: 1.33 ratio         PTT - ( 25 Mar 2019 05:13 )  PTT:31.0 sec    Lactate Trend      CARDIAC MARKERS ( 25 Mar 2019 05:13 )  x     / 0.01 ng/mL / x     / x     / x        < from: Transthoracic Echocardiogram (02.26.19 @ 08:42) >     1. Left ventricular ejection fraction, by visual estimation, is 25 to   30%.   2. Severely decreased global left ventricular systolic function.   3. Mean gradient across MV is 10.   4. PSAP at least 35.   5. Bioprosthesis in the aortic position.   6. Mild aortic regurgitation.   7. Peak AV gradient is 25 with a mean of 12 KARSTEN 1.3.   8. Trace pulmonic valve regurgitation.   9. Dilatation of the ascending aorta.    < end of copied text >      < from: 12 Lead ECG (03.25.19 @ 05:56) >    Diagnosis Line AV dual-paced rhythm with prolonged AV conduction  Abnormal ECG      < end of copied text >      CAPILLARY BLOOD GLUCOSE    < from: Xray Chest 1 View-PORTABLE IMMEDIATE (03.25.19 @ 06:10) >    No radiographic evidence of acute cardiopulmonary disease.

## 2019-03-25 NOTE — CONSULT NOTE ADULT - ATTENDING COMMENTS
Cardiologist: Dr. Levin  Covering for EP: Dr. Daily    79 yo M with history of CAD with MI in 1995 s/p CABG, HL, chronic systolic HF s/p DC ICD with recent generator change last year, COPD, AVR (2015), MVR (1995) who presents after his ICD shocked him twice in the last 3 days. First time was on Fri night while sleeping (3/23) and the second time was overnight (3/25). Pt denies chest pain but complains of upper abdominal tightness with exertion. He states he was shocked about 3 years ago by his device and he was medically managed. He also states he had a hard time with anesthesia when he underwent cholecystectomy.     Slow VT with Appropriate ICD shocks x 2 (device interrogation reviewed. VT rate ~ 140 bpm)  CAD s/p CABG  MVR  AVR  Cardiomyopathy  LBBB    Recommend  - Repeat echo  - Cardiac MRI to evaluate for scar  - Monitor lytes and keep K>4 and Mg>2  - Discussed the possibility of a VT ablation during this admission. Discussed risks/benefits/details of procedure.  - Will have anesthesia evaluate patient while he is in house prior to ablation given history of difficulty with anesthesia during cholecystectomy  - Continue Amiodarone with bolus and drip  - Continue BB and Mexiletine  - Eventually patient will need an upgrade of his device to BiV ICD given cardiomyopathy and LBBB. I discussed this with the patient as well. Cardiologist: Dr. Levin  Covering for EP: Dr. Daily    79 yo M with history of CAD with MI in 1995 s/p CABG, HL, chronic systolic HF s/p DC ICD with recent generator change last year, COPD, AVR (2015), MVR (1995) who presents after his ICD shocked him twice in the last 3 days. First time was on Fri night while sleeping (3/23) and the second time was overnight (3/25). Pt denies chest pain but complains of upper abdominal tightness with exertion. He states he was shocked about 3 years ago by his device and he was medically managed. He also states he had a hard time with anesthesia when he underwent cholecystectomy.     Slow VT with Appropriate ICD shocks x 2 (device interrogation reviewed. VT rate ~ 140 bpm)  CAD s/p CABG  MVR  AVR  Cardiomyopathy  LBBB    Recommend  - Admit to CCU  - Repeat echo  - Recommend cardiac cath to evaluate for ischemia as a potential cause of VT  - Cardiac MRI to evaluate for scar  - Monitor lytes and keep K>4 and Mg>2  - Discussed the possibility of a VT ablation during this admission. Discussed risks/benefits/details of procedure.  - Will have anesthesia evaluate patient while he is in house prior to ablation given history of difficulty with anesthesia during cholecystectomy  - Continue Amiodarone with bolus and drip  - Continue BB and Mexiletine  - Eventually patient will need an upgrade of his device to BiV ICD given cardiomyopathy and LBBB. I discussed this with the patient as well. Cardiologist: Dr. Levin  Covering for EP: Dr. Daily    77 yo M with history of CAD with MI in 1995 s/p CABG, HL, chronic systolic HF s/p DC ICD with recent generator change last year, COPD, AVR (2015), MVR (1995) who presents after his ICD shocked him twice in the last 3 days. First time was on Fri night while sleeping (3/23) and the second time was overnight (3/25). Pt denies chest pain but complains of upper abdominal tightness with exertion. He states he was shocked about 3 years ago by his device and he was medically managed. He also states he had a hard time with anesthesia when he underwent cholecystectomy.     Slow VT with Appropriate ICD shocks x 2 (device interrogation reviewed. VT rate ~ 140 bpm)  CAD s/p CABG  MVR  AVR  Cardiomyopathy  LBBB    Recommend  - Admit to CCU  - Repeat echo  - Recommend cardiac cath to evaluate for ischemia as a potential cause of VT  - Cardiac MRI to evaluate for scar - unable 2/2 mechanical valve  - Monitor lytes and keep K>4 and Mg>2  - Discussed the possibility of a VT ablation during this admission. Discussed risks/benefits/details of procedure.  - Will have anesthesia evaluate patient while he is in house prior to ablation given history of difficulty with anesthesia during cholecystectomy  - Continue Amiodarone with bolus and drip  - Continue BB and Mexiletine  - Eventually patient will need an upgrade of his device to BiV ICD given cardiomyopathy and LBBB. I discussed this with the patient as well.

## 2019-03-25 NOTE — ED ADULT NURSE NOTE - NSIMPLEMENTINTERV_GEN_ALL_ED
Implemented All Universal Safety Interventions:  [place] to call system. Call bell, personal items and telephone within reach. Instruct patient to call for assistance. Room bathroom lighting operational. Non-slip footwear when patient is off stretcher. Physically safe environment: no spills, clutter or unnecessary equipment. Stretcher in lowest position, wheels locked, appropriate side rails in place.

## 2019-03-25 NOTE — ED ADULT NURSE NOTE - OBJECTIVE STATEMENT
Irradiance (Optional- Include Units): 4.13 Protocol For Photochemotherapy: Petrolatum And Broad Band Uvb: The patient received Photochemotherapy: Petrolatum and Broad Band UVB. Protocol For Nbuvb: The patient received NBUVB. Protocol For Photochemotherapy: Baby Oil And Nbuvb: The patient received Photochemotherapy: Baby Oil and NBUVB (baby oil applied to all lesions prior to phototherapy). Treatment Number: 1500 [facility] Protocol For Protocol For Photochemotherapy For Severe Photoresponsive Dermatoses: Bath Puva: The patient received Photochemotherapy for severe photoresponsive dermatoses: Bath PUVA requiring at least 4 to 8 hours of care under direct physician supervision. Total Body Energy: 325 mj Detail Level: Generalized Protocol: Photochemotherapy: Baby Oil and NBUVB Protocol For Puva: The patient received PUVA. Protocol For Photochemotherapy: Tar And Broad Band Uvb (Goeckerman Treatment): The patient received Photochemotherapy: Tar and Broad Band UVB (Goeckerman treatment). Protocol For Nb Uva: The patient received NB UVA. Skin Type: I Protocol For Photochemotherapy: Mineral Oil And Broad Band Uvb: The patient received Photochemotherapy: Mineral Oil and Broad Band UVB. Protocol For Uva: The patient received UVA. Post-Care Instructions: I reviewed with the patient in detail post-care instructions. Patient is to wear sun protection. Patients may expect sunburn like redness, discomfort and scabbing. Render Post-Care In The Note: no Protocol For Photochemotherapy For Severe Photoresponsive Dermatoses: Petrolatum And Broad Band Uvb: The patient received Photochemotherapyfor severe photoresponsive dermatoses: Petrolatum and Broad Band UVB requiring at least 4 to 8 hours of care under direct physician supervision. Protocol For Photochemotherapy For Severe Photoresponsive Dermatoses: Tar And Nbuvb (Goeckerman Treatment): The patient received Photochemotherapy for severe photoresponsive dermatoses: Tar and NBUVB (Goeckerman treatment) requiring at least 4 to 8 hours of care under direct physician supervision. Protocol For Photochemotherapy For Severe Photoresponsive Dermatoses: Puva: The patient received Photochemotherapy for severe photoresponsive dermatoses: PUVA requiring at least 4 to 8 hours of care under direct physician supervision. Consent: Written consent obtained. The risks were reviewed with the patient including but not limited to: burn, pigmentary changes, pain, blistering, scabbing, redness, increased risk of skin cancers, and the remote possibility of scarring. Protocol For Uva1: The patient received UVA1. Total Treatment Time: 20 min Protocol For Bath Puva: The patient received Bath PUVA. Protocol For Photochemotherapy For Severe Photoresponsive Dermatoses: Tar And Broad Band Uvb (Goeckerman Treatment): The patient received Photochemotherapy for severe photoresponsive dermatoses: Tar and Broad Band UVB (Goeckerman treatment) requiring at least 4 to 8 hours of care under direct physician supervision. Protocol For Photochemotherapy For Severe Photoresponsive Dermatoses: Petrolatum And Nbuvb: The patient received Photochemotherapy for severe photoresponsive dermatoses: Petrolatum and NBUVB requiring at least 4 to 8 hours of care under direct physician supervision. Protocol For Broad Band Uvb: The patient received Broad Band UVB. Name Of Supervising Technician: nick Protocol For Photochemotherapy: Triamcinolone Ointment And Nbuvb: The patient received Photochemotherapy: Triamcinolone and NBUVB (triamcinolone ointment applied to all lesions prior to phototherapy). Protocol For Photochemotherapy: Petrolatum And Nbuvb: The patient received Photochemotherapy: Petrolatum and NBUVB (petrolatum applied to all lesions prior to phototherapy). Protocol For Photochemotherapy: Tar And Nbuvb (Goeckerman Treatment): The patient received Photochemotherapy: Tar and NBUVB (Goeckerman treatment). Protocol For Photochemotherapy: Mineral Oil And Nbuvb: The patient received Photochemotherapy: Mineral Oil and NBUVB (mineral oil applied to all lesions prior to phototherapy). Total Body Time: 1:19 Patient states his AICD fired 25 minutes PTA, patient sleeping at that time. Also fired 2 days ago. Had follow up appointment with cardiology today

## 2019-03-25 NOTE — H&P ADULT - NSICDXPASTSURGICALHX_GEN_ALL_CORE_FT
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

## 2019-03-25 NOTE — ED PROVIDER NOTE - CLINICAL SUMMARY MEDICAL DECISION MAKING FREE TEXT BOX
77 yo man with appropriate shock from AICd x 2 which appears to be due to Vtach.   Likely due to prior AMI.  seen by cardio.  CCU approved.  IV amio and admission.

## 2019-03-25 NOTE — H&P ADULT - ASSESSMENT
78 year old with PMH of MI in 1995, systolic CHF s/p AICD, COPD on home oxygen, (rheumatic heart disease), bioprosthetic aortic valve, melanoma and squamous cell carcinoma s/p resections presents to the ED after being shocked twice by AICD , AICD interrogation showed 2 episodes of monomorphic V-tach  for which patient was appropriately shocked.    #2 episodes of monomorphic V-tach (rate 136-140)  - AICD fired appropriately  - likely related to 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 Had subsequent follow up with Dr. Daily during which medications were adjusted.   - continue metoprolol/ mexilitine, amiodarone bolus was given and patient was restarted on amiodarone drip according to EP recommendations     # COPD on home O2 - stable   - Xopenex on hold , possible trigger for vtach  - continue with Symbicort and Tiotropium once a day      # History of rheumatic heart disease s/p mechanical Mitral valve and bioprosthetic aortic valve.  - On coumadin to target INR of 2.5-3.5    # Thoracic aortic aneurism  - CT angio showed Ascending thoracic aortic aneurysm measuring up to 4.8 cm and descending thoracic aortic aneurysm measuring 3.7 cm, stable.      # Incidental right renal lesion found on CT scan; Indeterminate right interpolar 1.1 cm renal lesion.   Out patient follow-up renal mass protocol CT or MRI is recommended in 3-6 months.    # Dyslipidemia  -Continue with Statins    # GI prophylaxis  -not indicated      # DVT prophylaxis  -On coumadin. 78 year old with PMH of MI in 1995, systolic CHF s/p AICD, COPD on home oxygen, (rheumatic heart disease), bioprosthetic aortic valve, melanoma and squamous cell carcinoma s/p resections presents to the ED after being shocked twice by AICD , AICD interrogation showed 2 episodes of monomorphic V-tach for which patient was appropriately shocked.    #2 episodes of monomorphic V-tach (rate 136-140)  - AICD fired appropriately  - likely related to 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 Had subsequent follow up with Dr. Daily during which medications were adjusted.   - continue metoprolol/ mexilitine, amiodarone bolus was given and patient was restarted on amiodarone drip according to EP recommendations     # COPD on home O2 - stable   - Xopenex on hold , possible trigger for vtach  - continue with Symbicort and Tiotropium once a day      # History of rheumatic heart disease s/p mechanical Mitral valve and bioprosthetic aortic valve.  - On coumadin to target INR of 2.5-3.5    # Thoracic aortic aneurism  - CT angio showed Ascending thoracic aortic aneurysm measuring up to 4.8 cm and descending thoracic aortic aneurysm measuring 3.7 cm, stable.      # Incidental right renal lesion found on CT scan; Indeterminate right interpolar 1.1 cm renal lesion.   Out patient follow-up renal mass protocol CT or MRI is recommended in 3-6 months.    # Dyslipidemia  -Continue with Statin    # GI prophylaxis  -not indicated      # DVT prophylaxis  -On coumadin. 78 year old with PMH of MI in 1995, systolic CHF s/p AICD, COPD on home oxygen, (rheumatic heart disease), bioprosthetic aortic valve, melanoma and squamous cell carcinoma s/p resections presents to the ED after being shocked twice by AICD , AICD interrogation showed 2 episodes of monomorphic V-tach for which patient was appropriately shocked.    #2 episodes of monomorphic V-tach (rate 136-140)  - AICD fired appropriately  - likely related to 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 Had subsequent follow up with Dr. Daily during which medications were adjusted.   - continue metoprolol/ mexilitine, amiodarone bolus was given and patient was restarted on amiodarone drip according to EP recommendations     # COPD on home O2 - stable   - Xopenex on hold , possible trigger for vtach  - continue with Symbicort and Tiotropium once a day      # History of rheumatic heart disease s/p mechanical Mitral valve and bioprosthetic aortic valve.  - On coumadin to target INR of 2.5-3.5 , INR is 1.3 subtherapeutic, will bridge with therapeutic Lovenox till INR is therapeutic.     # Thoracic aortic aneurism  - CT angio showed Ascending thoracic aortic aneurysm measuring up to 4.8 cm and descending thoracic aortic aneurysm measuring 3.7 cm, stable.      # Incidental right renal lesion found on CT scan; Indeterminate right interpolar 1.1 cm renal lesion.   Out patient follow-up renal mass protocol CT or MRI is recommended in 3-6 months.    # Dyslipidemia  -Continue with Statin    # GI prophylaxis  -not indicated      # DVT prophylaxis  -On coumadin.

## 2019-03-25 NOTE — H&P ADULT - NSICDXFAMILYHX_GEN_ALL_CORE_FT
FAMILY HISTORY:  Sibling  Still living? Unknown  Cancer, Age at diagnosis: Age Unknown  Family history of heart disease, Age at diagnosis: Age Unknown

## 2019-03-25 NOTE — ED PROVIDER NOTE - ATTENDING CONTRIBUTION TO CARE
PT has been in usual state of health, had small "shock" on Friday. Was awoken from sleep with large shock 25min pta. no complaints currently.

## 2019-03-25 NOTE — ED PROVIDER NOTE - NS ED ROS FT
Constitutional:  See HPI.   Eyes:  No visual changes, eye pain or discharge.  ENMT:  No hearing changes, pain, discharge or infections. No neck pain or stiffness.  Cardiac:  No  SOB or edema.    Respiratory:  No cough or respiratory distress. No hemoptysis.  GI:  No nausea, vomiting, diarrhea, abdominal pain.  :  No dysuria, frequency, hematuria  MS:  No joint pain or back pain.  Neuro:  No LOC. No headache or weakness.    Skin:  No skin rash.  Except as in HPI, all other review of systems is negative

## 2019-03-25 NOTE — ED PROVIDER NOTE - PHYSICAL EXAMINATION
Con: Well appearing NAD non toxic.   HEENT: NCAT EOMI conjunctiva normal. No nasal discharge. MMM.   Neck: Supple, non tender, full ROM.   CV: RRR no MRG +S1S2.   Pulm: CTA b/l.   Abd: s NT ND +BS.   Ext: WWP x4, moving all extremities, no edema.   Psy: Cooperative, appropriate.   Skin: Warm, dry, no rash  Neuro: CN2-12 grossly intact no sensory or motor deficits throughout

## 2019-03-25 NOTE — H&P ADULT - ATTENDING COMMENTS
Pt seen and examined independently. Wife at bedside. RN present for interview.  Awaiting CCU bed.  He feels better now - no recurrent shocks since admission.  Mild wheezes on lung exam. He does not use inhalers more than directed.  Currently on amiodarone drip.  Last admission, he was on amiodarone PO and meds were adjusted as outpt by Dr. Daily.  K and Mg levels acceptable.  EP attending eval pending.  VT ablation to be discussed.    I reviewed the resident's note and I agree with the history, physical exam, assessment and plan with additions as above.        Progress Note Handoff    Pending: EP attending eval    Family discussion: yes    Disposition:   to be determined

## 2019-03-25 NOTE — CONSULT NOTE ADULT - SUBJECTIVE AND OBJECTIVE BOX
Date of Admission: 3/25    CHIEF COMPLAINT: my ICD shocked me twice    HISTORY OF PRESENT ILLNESS: 78yMale with PMH below presented to the hospital for above CC during which the patient's ICD had fired twice, The first time was in his sleep on friday night and he felt fine afterwards so he went back to bed. Second episode was last night and patient states that this one felt stronger than the one prior. He feels somewhat sore after the device shocked him. No exertional symptoms and no preceding symptoms to either shock. Only common thread was that he was asleep for both episodes.     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  Right inguinal hernia: 2006  H/O surgery to major organs, presenting hazards to health: Cholecystectomy 2017  H/O surgery to heart and great vessels, presenting hazards to health: AVR (2015 / MVR (1995)  H/O surgery to major organs, presenting hazards to health: ICD Implant 2012    HEALTH ISSUES - PROBLEM Dx:        FAMILY HISTORY:  Cancer (Sibling): Brother: Prostate cancer  Family history of heart disease (Sibling): Brother    None [x ]  Mother:   Father:   Siblings:     SOCIAL HISTORY:    [x ] Non-smoker  [ ] Smoker  [ ] Alcohol    Allergies    No Known Allergies    Intolerances    	    REVIEW OF SYSTEMS:  CONSTITUTIONAL: No fever, weight loss, or fatigue  CARDIOLOGY: see HPI  RESPIRATORY: see HPI  NEUROLOGICAL: NO weakness, no focal deficits to report.  ENDOCRINOLOGICAL: no recent change in diabetic medications.   GI: no BRBPR, no N,V,diarrhea.    PSYCHIATRY: normal mood and affect  HEENT: no nasal discharge, no ecchymosis  SKIN: no ecchymosis, no breakdown  MUSCULOSKELETAL: Full range of motion x4.      PHYSICAL EXAM:  T(C): 36.1 (03-25-19 @ 05:12), Max: 36.1 (03-25-19 @ 05:12)  HR: 74 (03-25-19 @ 06:37) (74 - 78)  BP: 90/50 (03-25-19 @ 06:37) (90/50 - 131/67)  RR: 18 (03-25-19 @ 06:37) (18 - 20)  SpO2: 99% (03-25-19 @ 06:37) (99% - 99%)  Wt(kg): --  I&O's Summary    Daily     Daily     General Appearance: Normal	  Cardiovascular: Normal S1 S2, No JVD, No murmurs, No edema. +ICD in left upper anterior chest. +midsternal.   Respiratory: Lungs clear to auscultation	  Psychiatry: A & O x 3, Mood & affect appropriate  Gastrointestinal:  Soft, Non-tender  Skin: No rashes, No ecchymoses, No cyanosis. 	  Neurologic: Non-focal  Musculoskeletal/ extremities: Normal range of motion, No clubbing, cyanosis or edema  Vascular: Peripheral pulses palpable 2+ bilaterally    LABS:	 	                          16.6   11.09 )-----------( 161      ( 25 Mar 2019 05:13 )             52.0     03-25    138  |  93<L>  |  31<H>  ----------------------------<  118<H>  4.4   |  33<H>  |  1.5    Ca    9.7      25 Mar 2019 05:13  Mg     2.0     03-25    TPro  7.2  /  Alb  4.0  /  TBili  0.3  /  DBili  x   /  AST  42<H>  /  ALT  56<H>  /  AlkPhos  93  03-25    CARDIAC MARKERS ( 25 Mar 2019 05:13 )  x     / 0.01 ng/mL / x     / x     / x          PT/INR - ( 25 Mar 2019 05:13 )   PT: 15.20 sec;   INR: 1.33 ratio         PTT - ( 25 Mar 2019 05:13 )  PTT:31.0 sec    CARDIAC MARKERS:            TELEMETRY EVENTS: 	    ECG:  AV paced rhythm at 75	  RADIOLOGY: CXR- no acute CP dz, follow up official read. ICD leads in place, no evidence for fluid overload.   OTHER: 	    PREVIOUS DIAGNOSTIC TESTING:    [x ] Echocardiogram: < from: Transthoracic Echocardiogram (02.26.19 @ 08:42) >  Summary:   1. Left ventricular ejection fraction, by visual estimation, is 25 to   30%.   2. Severely decreased global left ventricular systolic function.   3. Mean gradient across MV is 10.   4. PSAP at least 35.   5. Bioprosthesis in the aortic position.   6. Mild aortic regurgitation.   7. Peak AV gradient is 25 with a mean of 12 KARSTEN 1.3.   8. Trace pulmonic valve regurgitation.   9. Dilatation of the ascending aorta.    < end of copied text >    [x]  Catheterization: 2016   [ ] Stress Test:  	  	    Home Medications:  Coumadin 7.5 mg oral tablet: 1 tab(s) orally once a day (25 Mar 2019 05:29)  dicyclomine 10 mg oral capsule: 1 cap(s) orally once a day (25 Mar 2019 05:29)  Incruse Ellipta 62.5 mcg/inh inhalation powder: 1 puff(s) inhaled once a day (25 Mar 2019 05:29)  simvastatin 40 mg oral tablet: 1 tab(s) orally once a day (at bedtime) (25 Mar 2019 05:29)  Symbicort 80 mcg-4.5 mcg/inh inhalation aerosol: 2 puff(s) inhaled 2 times a day, As Needed (25 Mar 2019 05:29)  torsemide 20 mg oral tablet: 1 tab(s) orally 2 times a day (25 Mar 2019 05:29)    MEDICATIONS  (STANDING):    MEDICATIONS  (PRN): Date of Admission: 3/25    CHIEF COMPLAINT: my ICD shocked me twice    HISTORY OF PRESENT ILLNESS: 78yMale with PMH below presented to the hospital for above CC during which the patient's ICD had fired twice, The first time was in his sleep on friday night and he felt fine afterwards so he went back to bed. Second episode was last night and patient states that this one felt stronger than the one prior. He feels somewhat sore after the device shocked him. No exertional symptoms and no preceding symptoms to either shock. Only common thread was that he was asleep for both episodes.     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  Right inguinal hernia: 2006  H/O surgery to major organs, presenting hazards to health: Cholecystectomy 2017  H/O surgery to heart and great vessels, presenting hazards to health: AVR (2015 / MVR (1995)  H/O surgery to major organs, presenting hazards to health: ICD Implant 2012    HEALTH ISSUES - PROBLEM Dx:        FAMILY HISTORY:  Cancer (Sibling): Brother: Prostate cancer  Family history of heart disease (Sibling): Brother    None [x ]  Mother:   Father:   Siblings:     SOCIAL HISTORY:    [x ] Non-smoker  [ ] Smoker  [ ] Alcohol    Allergies    No Known Allergies    Intolerances    	    REVIEW OF SYSTEMS:  CONSTITUTIONAL: No fever, weight loss, or fatigue  CARDIOLOGY: see HPI  RESPIRATORY: see HPI  NEUROLOGICAL: NO weakness, no focal deficits to report.  ENDOCRINOLOGICAL: no recent change in diabetic medications.   GI: no BRBPR, no N,V,diarrhea.    PSYCHIATRY: normal mood and affect  HEENT: no nasal discharge, no ecchymosis  SKIN: no ecchymosis, no breakdown  MUSCULOSKELETAL: Full range of motion x4.      PHYSICAL EXAM:  T(C): 36.1 (03-25-19 @ 05:12), Max: 36.1 (03-25-19 @ 05:12)  HR: 74 (03-25-19 @ 06:37) (74 - 78)  BP: 90/50 (03-25-19 @ 06:37) (90/50 - 131/67)  RR: 18 (03-25-19 @ 06:37) (18 - 20)  SpO2: 99% (03-25-19 @ 06:37) (99% - 99%)  Wt(kg): --  I&O's Summary    Daily     Daily     General Appearance: Normal	  Cardiovascular: Normal S1 S2, No JVD, No murmurs, No edema. +ICD in left upper anterior chest.   Respiratory: Lungs clear to auscultation	  Psychiatry: A & O x 3, Mood & affect appropriate  Gastrointestinal:  Soft, Non-tender  Skin: No rashes, No ecchymoses, No cyanosis.Old healed midline sternotomy scar.   Neurologic: Non-focal  Musculoskeletal/ extremities: Normal range of motion, No clubbing, cyanosis or edema; missing a digit on his left hand.  Vascular: Peripheral pulses palpable 2+ bilaterally    LABS:	 	                          16.6   11.09 )-----------( 161      ( 25 Mar 2019 05:13 )             52.0     03-25    138  |  93<L>  |  31<H>  ----------------------------<  118<H>  4.4   |  33<H>  |  1.5    Ca    9.7      25 Mar 2019 05:13  Mg     2.0     03-25    TPro  7.2  /  Alb  4.0  /  TBili  0.3  /  DBili  x   /  AST  42<H>  /  ALT  56<H>  /  AlkPhos  93  03-25    CARDIAC MARKERS ( 25 Mar 2019 05:13 )  x     / 0.01 ng/mL / x     / x     / x          PT/INR - ( 25 Mar 2019 05:13 )   PT: 15.20 sec;   INR: 1.33 ratio         PTT - ( 25 Mar 2019 05:13 )  PTT:31.0 sec    CARDIAC MARKERS:      TELEMETRY EVENTS: 	    ECG:  AV paced rhythm at 75	  RADIOLOGY: CXR- no acute CP dz, follow up official read. ICD leads in place, no evidence for fluid overload.   OTHER: 	    PREVIOUS DIAGNOSTIC TESTING:    [x ] Echocardiogram: < from: Transthoracic Echocardiogram (02.26.19 @ 08:42) >  Summary:   1. Left ventricular ejection fraction, by visual estimation, is 25 to   30%.   2. Severely decreased global left ventricular systolic function.   3. Mean gradient across MV is 10.   4. PSAP at least 35.   5. Bioprosthesis in the aortic position.   6. Mild aortic regurgitation.   7. Peak AV gradient is 25 with a mean of 12 KARSTEN 1.3.   8. Trace pulmonic valve regurgitation.   9. Dilatation of the ascending aorta.    < end of copied text >    [x]  Catheterization: 2016     [ ] Stress Test:  	  	    Home Medications:  Coumadin 7.5 mg oral tablet: 1 tab(s) orally once a day (25 Mar 2019 05:29)  dicyclomine 10 mg oral capsule: 1 cap(s) orally once a day (25 Mar 2019 05:29)  Incruse Ellipta 62.5 mcg/inh inhalation powder: 1 puff(s) inhaled once a day (25 Mar 2019 05:29)  simvastatin 40 mg oral tablet: 1 tab(s) orally once a day (at bedtime) (25 Mar 2019 05:29)  Symbicort 80 mcg-4.5 mcg/inh inhalation aerosol: 2 puff(s) inhaled 2 times a day, As Needed (25 Mar 2019 05:29)  torsemide 20 mg oral tablet: 1 tab(s) orally 2 times a day (25 Mar 2019 05:29)    MEDICATIONS  (STANDING):    MEDICATIONS  (PRN):

## 2019-03-25 NOTE — H&P ADULT - NSHPPHYSICALEXAM_GEN_ALL_CORE
T(C): 36.1 (03-25-19 @ 05:12), Max: 36.1 (03-25-19 @ 05:12)  HR: 74 (03-25-19 @ 06:37) (74 - 78)  BP: 90/50 (03-25-19 @ 06:37) (90/50 - 131/67)  RR: 18 (03-25-19 @ 06:37) (18 - 20)  SpO2: 99% (03-25-19 @ 06:37) (99% - 99%)    PHYSICAL EXAM:  GENERAL: NAD, well-developed  HEAD:  Atraumatic, Normocephalic  EYES: EOMI, PERRLA, conjunctiva and sclera clear  ENT: Normal tympanic membrane. No nasal obstruction or discharge. No tonsillar exudate, swelling or erythema.  NECK: Supple, No JVD  CHEST/LUNG: Clear to auscultation bilaterally; No wheeze  HEART: Regular rate and rhythm; No murmurs, rubs, or gallops  ABDOMEN: Soft, Nontender, Nondistended; Bowel sounds present  EXTREMITIES:  2+ Peripheral Pulses, No clubbing, cyanosis, or edema  PSYCH: AAOx3  NEUROLOGY: non-focal  SKIN: No rashes or lesions T(C): 36.1 (03-25-19 @ 05:12), Max: 36.1 (03-25-19 @ 05:12)  HR: 74 (03-25-19 @ 06:37) (74 - 78)  BP: 90/50 (03-25-19 @ 06:37) (90/50 - 131/67)  RR: 18 (03-25-19 @ 06:37) (18 - 20)  SpO2: 99% (03-25-19 @ 06:37) (99% - 99%)    PHYSICAL EXAM:  GENERAL: NAD, well-developed  HEAD:  Atraumatic, Normocephalic  EYES: EOMI, PERRLA, conjunctiva and sclera clear  ENT: Normal tympanic membrane. No nasal obstruction or discharge. No tonsillar exudate, swelling or erythema.  NECK: Supple, No JVD  CHEST/LUNG: Clear to auscultation bilaterally; No wheeze  HEART: Regular rate and rhythm; No murmurs, rubs, or gallops  AICD present  ABDOMEN: Soft, Nontender, Nondistended; Bowel sounds present  EXTREMITIES:  2+ Peripheral Pulses, No clubbing, cyanosis, or edema  PSYCH: AAOx3  NEUROLOGY: non-focal  SKIN: No rashes or lesions

## 2019-03-26 LAB
ALBUMIN SERPL ELPH-MCNC: 3.3 G/DL — LOW (ref 3.5–5.2)
ALP SERPL-CCNC: 74 U/L — SIGNIFICANT CHANGE UP (ref 30–115)
ALT FLD-CCNC: 48 U/L — HIGH (ref 0–41)
ANION GAP SERPL CALC-SCNC: 14 MMOL/L — SIGNIFICANT CHANGE UP (ref 7–14)
APTT BLD: 42.9 SEC — HIGH (ref 27–39.2)
AST SERPL-CCNC: 36 U/L — SIGNIFICANT CHANGE UP (ref 0–41)
BASOPHILS # BLD AUTO: 0.07 K/UL — SIGNIFICANT CHANGE UP (ref 0–0.2)
BASOPHILS NFR BLD AUTO: 0.7 % — SIGNIFICANT CHANGE UP (ref 0–1)
BILIRUB SERPL-MCNC: 0.5 MG/DL — SIGNIFICANT CHANGE UP (ref 0.2–1.2)
BUN SERPL-MCNC: 24 MG/DL — HIGH (ref 10–20)
CALCIUM SERPL-MCNC: 9 MG/DL — SIGNIFICANT CHANGE UP (ref 8.5–10.1)
CHLORIDE SERPL-SCNC: 101 MMOL/L — SIGNIFICANT CHANGE UP (ref 98–110)
CO2 SERPL-SCNC: 25 MMOL/L — SIGNIFICANT CHANGE UP (ref 17–32)
CREAT SERPL-MCNC: 1.1 MG/DL — SIGNIFICANT CHANGE UP (ref 0.7–1.5)
EOSINOPHIL # BLD AUTO: 0.18 K/UL — SIGNIFICANT CHANGE UP (ref 0–0.7)
EOSINOPHIL NFR BLD AUTO: 1.8 % — SIGNIFICANT CHANGE UP (ref 0–8)
GLUCOSE SERPL-MCNC: 103 MG/DL — HIGH (ref 70–99)
HCT VFR BLD CALC: 42.6 % — SIGNIFICANT CHANGE UP (ref 42–52)
HGB BLD-MCNC: 13.9 G/DL — LOW (ref 14–18)
IMM GRANULOCYTES NFR BLD AUTO: 1.3 % — HIGH (ref 0.1–0.3)
INR BLD: 1.37 RATIO — HIGH (ref 0.65–1.3)
LYMPHOCYTES # BLD AUTO: 1.03 K/UL — LOW (ref 1.2–3.4)
LYMPHOCYTES # BLD AUTO: 10.4 % — LOW (ref 20.5–51.1)
MAGNESIUM SERPL-MCNC: 2.3 MG/DL — SIGNIFICANT CHANGE UP (ref 1.8–2.4)
MCHC RBC-ENTMCNC: 28.5 PG — SIGNIFICANT CHANGE UP (ref 27–31)
MCHC RBC-ENTMCNC: 32.6 G/DL — SIGNIFICANT CHANGE UP (ref 32–37)
MCV RBC AUTO: 87.5 FL — SIGNIFICANT CHANGE UP (ref 80–94)
MONOCYTES # BLD AUTO: 0.93 K/UL — HIGH (ref 0.1–0.6)
MONOCYTES NFR BLD AUTO: 9.4 % — HIGH (ref 1.7–9.3)
NEUTROPHILS # BLD AUTO: 7.57 K/UL — HIGH (ref 1.4–6.5)
NEUTROPHILS NFR BLD AUTO: 76.4 % — HIGH (ref 42.2–75.2)
NRBC # BLD: 0 /100 WBCS — SIGNIFICANT CHANGE UP (ref 0–0)
PLATELET # BLD AUTO: 122 K/UL — LOW (ref 130–400)
POTASSIUM SERPL-MCNC: 4.2 MMOL/L — SIGNIFICANT CHANGE UP (ref 3.5–5)
POTASSIUM SERPL-SCNC: 4.2 MMOL/L — SIGNIFICANT CHANGE UP (ref 3.5–5)
PROT SERPL-MCNC: 5.6 G/DL — LOW (ref 6–8)
PROTHROM AB SERPL-ACNC: 15.7 SEC — HIGH (ref 9.95–12.87)
RBC # BLD: 4.87 M/UL — SIGNIFICANT CHANGE UP (ref 4.7–6.1)
RBC # FLD: 13.2 % — SIGNIFICANT CHANGE UP (ref 11.5–14.5)
SODIUM SERPL-SCNC: 140 MMOL/L — SIGNIFICANT CHANGE UP (ref 135–146)
WBC # BLD: 9.91 K/UL — SIGNIFICANT CHANGE UP (ref 4.8–10.8)
WBC # FLD AUTO: 9.91 K/UL — SIGNIFICANT CHANGE UP (ref 4.8–10.8)

## 2019-03-26 RX ORDER — WARFARIN SODIUM 2.5 MG/1
10 TABLET ORAL ONCE
Qty: 0 | Refills: 0 | Status: COMPLETED | OUTPATIENT
Start: 2019-03-26 | End: 2019-03-26

## 2019-03-26 RX ORDER — AMIODARONE HYDROCHLORIDE 400 MG/1
0.5 TABLET ORAL
Qty: 900 | Refills: 0 | Status: DISCONTINUED | OUTPATIENT
Start: 2019-03-26 | End: 2019-03-27

## 2019-03-26 RX ADMIN — ATORVASTATIN CALCIUM 40 MILLIGRAM(S): 80 TABLET, FILM COATED ORAL at 22:06

## 2019-03-26 RX ADMIN — Medication 20 MILLIEQUIVALENT(S): at 03:02

## 2019-03-26 RX ADMIN — BUDESONIDE AND FORMOTEROL FUMARATE DIHYDRATE 2 PUFF(S): 160; 4.5 AEROSOL RESPIRATORY (INHALATION) at 08:14

## 2019-03-26 RX ADMIN — ENOXAPARIN SODIUM 60 MILLIGRAM(S): 100 INJECTION SUBCUTANEOUS at 18:31

## 2019-03-26 RX ADMIN — MEXILETINE HYDROCHLORIDE 200 MILLIGRAM(S): 150 CAPSULE ORAL at 18:18

## 2019-03-26 RX ADMIN — Medication 25 MILLIGRAM(S): at 05:30

## 2019-03-26 RX ADMIN — Medication 20 MILLIEQUIVALENT(S): at 01:25

## 2019-03-26 RX ADMIN — WARFARIN SODIUM 10 MILLIGRAM(S): 2.5 TABLET ORAL at 22:49

## 2019-03-26 RX ADMIN — MEXILETINE HYDROCHLORIDE 200 MILLIGRAM(S): 150 CAPSULE ORAL at 05:30

## 2019-03-26 RX ADMIN — Medication 20 MILLIEQUIVALENT(S): at 05:30

## 2019-03-26 RX ADMIN — Medication 10 MILLIGRAM(S): at 12:01

## 2019-03-26 RX ADMIN — AMIODARONE HYDROCHLORIDE 16.67 MG/MIN: 400 TABLET ORAL at 13:47

## 2019-03-26 RX ADMIN — ENOXAPARIN SODIUM 60 MILLIGRAM(S): 100 INJECTION SUBCUTANEOUS at 05:30

## 2019-03-26 RX ADMIN — SPIRONOLACTONE 12.5 MILLIGRAM(S): 25 TABLET, FILM COATED ORAL at 05:30

## 2019-03-26 NOTE — PROGRESS NOTE ADULT - SUBJECTIVE AND OBJECTIVE BOX
INTERVAL HPI/OVERNIGHT EVENTS:  was started on Amio loading  No events on Tele, AV and V-Paced, no ectopy    MEDICATIONS  (STANDING):  amiodarone Infusion 0.5 mG/Min (16.667 mL/Hr) IV Continuous <Continuous>  atorvastatin 40 milliGRAM(s) Oral at bedtime  buDESOnide  80 MICROgram(s)/formoterol 4.5 MICROgram(s) Inhaler 2 Puff(s) Inhalation two times a day  dicyclomine 10 milliGRAM(s) Oral daily  enoxaparin Injectable 60 milliGRAM(s) SubCutaneous every 12 hours  metoprolol succinate ER 25 milliGRAM(s) Oral daily  mexiletine 200 milliGRAM(s) Oral two times a day  spironolactone 12.5 milliGRAM(s) Oral daily  tiotropium 18 MICROgram(s) Capsule 1 Capsule(s) Inhalation daily  warfarin 10 milliGRAM(s) Oral once @3/26    MEDICATIONS  (PRN):      Allergies    No Known Allergies    Intolerances        REVIEW OF SYSTEMS    [x ] A ten-point review of systems was otherwise negative except as noted.      Vital Signs Last 24 Hrs  T(C): 35.8 (26 Mar 2019 12:00), Max: 36.9 (25 Mar 2019 16:00)  T(F): 96.4 (26 Mar 2019 12:00), Max: 98.4 (25 Mar 2019 16:00)  HR: 78 (26 Mar 2019 12:00) (78 - 90)  BP: 107/62 (26 Mar 2019 12:00) (89/60 - 129/64)  BP(mean): 93 (26 Mar 2019 12:00) (70 - 107)  RR: 24 (26 Mar 2019 12:00) (14 - 36)  SpO2: 95% (26 Mar 2019 12:00) (92% - 98%)    03-25-19 @ 07:01  -  03-26-19 @ 07:00  --------------------------------------------------------  IN: 483.4 mL / OUT: 1125 mL / NET: -641.6 mL    03-26-19 @ 07:01  - 03-26-19 @ 13:51  --------------------------------------------------------  IN: 116.9 mL / OUT: 0 mL / NET: 116.9 mL      PHYSICAL EXAM:    General/Neuro:  alert & oriented x 3, no focal deficits, PERRLA, EOMI    Lungs:      clear to auscultation, Normal expansion/effort. no wheezes/rhonchi/rales    Cardiovascular : S1, S2.  Regular rate and rhythm. 2/6 SM at R+LSB,+ click    GI: Abdomen soft, Non-tender, Non-distended.  +BS    Extremities: Extremities warm, pink, well-perfused. no edema, Pulses: 1+    Derm: Good skin turgor, no skin breakdown.        LABS:                        13.9   9.91  )-----------( 122      ( 26 Mar 2019 04:38 )             42.6     03-26    140  |  101  |  24<H>  ----------------------------<  103<H>  4.2   |  25  |  1.1    Ca    9.0      26 Mar 2019 04:38  Mg     2.3     03-26    TPro  5.6<L>  /  Alb  3.3<L>  /  TBili  0.5  /  DBili  x   /  AST  36  /  ALT  48<H>  /  AlkPhos  74  03-26    PT/INR - ( 26 Mar 2019 11:42 )   PT: 15.70 sec;   INR: 1.37 ratio         PTT - ( 26 Mar 2019 11:42 )  PTT:42.9 sec      RADIOLOGY & ADDITIONAL TESTS:    Transthoracic Echocardiogram (02.26.19 @ 08:42) >  Summary:   1. Left ventricular ejection fraction, by visual estimation, is 25 to   30%.   2. Severely decreased global left ventricular systolic function.   3. Mean gradient across MV is 10.   4. PSAP at least 35.   5. Bioprosthesis in the aortic position.   6. Mild aortic regurgitation.   7. Peak AV gradient is 25 with a mean of 12 KARSTEN 1.3.   8. Trace pulmonic valve regurgitation.   9. Dilatation of the ascending aorta.    < end of copied text > INTERVAL HPI/OVERNIGHT EVENTS:  was started on IV Amio load  No events on Tele, AV and V-Paced, no ectopy    MEDICATIONS  (STANDING):  amiodarone Infusion 0.5 mG/Min (16.667 mL/Hr) IV Continuous <Continuous>  atorvastatin 40 milliGRAM(s) Oral at bedtime  buDESOnide  80 MICROgram(s)/formoterol 4.5 MICROgram(s) Inhaler 2 Puff(s) Inhalation two times a day  dicyclomine 10 milliGRAM(s) Oral daily  enoxaparin Injectable 60 milliGRAM(s) SubCutaneous every 12 hours  metoprolol succinate ER 25 milliGRAM(s) Oral daily  mexiletine 200 milliGRAM(s) Oral two times a day  spironolactone 12.5 milliGRAM(s) Oral daily  tiotropium 18 MICROgram(s) Capsule 1 Capsule(s) Inhalation daily  warfarin 10 milliGRAM(s) Oral once @3/26    MEDICATIONS  (PRN):      Allergies    No Known Allergies    Intolerances        REVIEW OF SYSTEMS    [x ] A ten-point review of systems was otherwise negative except as noted.      Vital Signs Last 24 Hrs  T(C): 35.8 (26 Mar 2019 12:00), Max: 36.9 (25 Mar 2019 16:00)  T(F): 96.4 (26 Mar 2019 12:00), Max: 98.4 (25 Mar 2019 16:00)  HR: 78 (26 Mar 2019 12:00) (78 - 90)  BP: 107/62 (26 Mar 2019 12:00) (89/60 - 129/64)  BP(mean): 93 (26 Mar 2019 12:00) (70 - 107)  RR: 24 (26 Mar 2019 12:00) (14 - 36)  SpO2: 95% (26 Mar 2019 12:00) (92% - 98%)    03-25-19 @ 07:01  -  03-26-19 @ 07:00  --------------------------------------------------------  IN: 483.4 mL / OUT: 1125 mL / NET: -641.6 mL    03-26-19 @ 07:01  - 03-26-19 @ 13:51  --------------------------------------------------------  IN: 116.9 mL / OUT: 0 mL / NET: 116.9 mL      PHYSICAL EXAM:    General/Neuro:  alert & oriented x 3, no focal deficits, PERRLA, EOMI    Lungs:      clear to auscultation, Normal expansion/effort. no wheezes/rhonchi/rales    Cardiovascular : S1, S2.  Regular rate and rhythm. 2/6 SM at R+LSB,+ click    GI: Abdomen soft, Non-tender, Non-distended.  +BS    Extremities: Extremities warm, pink, well-perfused. no edema, Pulses: 1+    Derm: Good skin turgor, no skin breakdown.        LABS:                        13.9   9.91  )-----------( 122      ( 26 Mar 2019 04:38 )             42.6     03-26    140  |  101  |  24<H>  ----------------------------<  103<H>  4.2   |  25  |  1.1    Ca    9.0      26 Mar 2019 04:38  Mg     2.3     03-26    TPro  5.6<L>  /  Alb  3.3<L>  /  TBili  0.5  /  DBili  x   /  AST  36  /  ALT  48<H>  /  AlkPhos  74  03-26    PT/INR - ( 26 Mar 2019 11:42 )   PT: 15.70 sec;   INR: 1.37 ratio         PTT - ( 26 Mar 2019 11:42 )  PTT:42.9 sec      RADIOLOGY & ADDITIONAL TESTS:    Transthoracic Echocardiogram (02.26.19 @ 08:42) >  Summary:   1. Left ventricular ejection fraction, by visual estimation, is 25 to   30%.   2. Severely decreased global left ventricular systolic function.   3. Mean gradient across MV is 10.   4. PSAP at least 35.   5. Bioprosthesis in the aortic position.   6. Mild aortic regurgitation.   7. Peak AV gradient is 25 with a mean of 12 KARSTEN 1.3.   8. Trace pulmonic valve regurgitation.   9. Dilatation of the ascending aorta.    < end of copied text >    < from: Transthoracic Echocardiogram (03.26.19 @ 09:17) >  Summary:   1. Left ventricular ejection fraction, by visual estimation, is <20%.   2. Severely decreased global left ventricular systolic function.   3. Mean Gradient across MV is 7.   4. Mild tricuspid regurgitation.   5. Bioprosthesis in the aortic position.   6. Mild to moderate aortic regurgitation.   7. Peak gradient acros AV is 20 with a mean of 12.   8. Pulmonic valve regurgitation.   9. Dilatation of the ascending aorta and aortic root.    < end of copied text >

## 2019-03-26 NOTE — PROGRESS NOTE ADULT - ATTENDING COMMENTS
Pending (specify):  Consults - EP for further recommendations. CCU monitoring   Family discussion: Family discussed with and family aware of patient conditions   Disposition: Unknown at this time
Cardiologist: Dr. Levin  Covering for EP: Dr. Daily    79 yo M with history of CAD with MI in 1995 s/p CABG, HL, chronic systolic HF s/p DC ICD with recent generator change last year, COPD, AVR (2015), MVR (1995) who presents after his ICD shocked him twice in the last 3 days. First time was on Fri night while sleeping (3/23) and the second time was overnight (3/25). Pt denies chest pain but complains of upper abdominal tightness with exertion. He states he was shocked about 3 years ago by his device and he was medically managed. He also states he had a hard time with anesthesia when he underwent cholecystectomy.     Slow VT with Appropriate ICD shocks x 2 (device interrogation reviewed. VT rate ~ 140 bpm)  CAD s/p CABG  MVR  AVR  Cardiomyopathy  LBBB    Recommend  - Continue to monitor on tele  - Consider stress test given worsening EF and VT  - Monitor lytes and keep K>4 and Mg>2  - Will hold off on ablation and watch patient on dual antiarrhythmics  - Check baseline LFTs and TSH  - Continue Amio drip  - Continue BB and Mexiletine  - Pt needs ICD upgrade to BiV ICD given cardiomyopathy and LBBB. I discussed this with the patient and informed him that this may not only improve his EF, but it may decrease recurrence of VT. However, he would like to hold off at this time as he feels he has been through too many procedures and surgeries.

## 2019-03-26 NOTE — PROGRESS NOTE ADULT - SUBJECTIVE AND OBJECTIVE BOX
________________________________________________________________________________  DAILY PROGRESS NOTE:    ================== SUBJECTIVE ==================    YONATHAN GRACIA  /   78y  /  Male  /  MRN#: [id number]  78 year old with PMH of MI in 1995, systolic CHF s/p AICD, COPD on home oxygen, (rheumatic heart disease), bioprosthetic aortic valve, melanoma and squamous cell carcinoma s/p resections presents to the ED for AICD shock. Patient's ICD had fired twice. The first time was in his sleep on Friday night and he felt fine afterwards so he went back to bed. Second episode was last night and patient states that this one felt stronger than the one prior. He feels somewhat sore after the device shocked him. No exertional symptoms and no preceding symptoms to either shock. Only common thread was that he was asleep for both episodes. Patient denies any fever or chills , no SOB, no chest pain, no palpitation, or diaphoresis. He mentioned that he is complaint with his medications.    HOSPITAL DAY: 1d     ICU DAY:    SUMMARY OF HOSPITAL COURSE TO DATE   3/25: AICD interrogated by EPS and shocked appropriately.    OVERNIGHT EVENTS:     TODAY: Patient was seen this morning at bedside. Currently, the patient reports no complaints.    Review of systems is otherwise negative.    ================= PRESENT TODAY ==================    1-Woodard Catheter:  Indication:  2-Central Line:   Location:  Indication:  3-IV Fluids:   Indication:  4-Drips:  4.1-Pressors:   4.2-Sedation:   4.3-Heparin:     5-Intubated:   Oxygen: Sat:   Ventilator Settings: Tidal Volume: RR: PEEP: FiO2: I/E:     =================== OBJECTIVE ===================    VITAL SIGNS: Last 24 Hours  T(C): 35.8 (26 Mar 2019 12:00), Max: 36.9 (25 Mar 2019 16:00)  T(F): 96.4 (26 Mar 2019 12:00), Max: 98.4 (25 Mar 2019 16:00)  HR: 78 (26 Mar 2019 14:00) (78 - 90)  BP: 120/63 (26 Mar 2019 14:00) (89/60 - 129/64)  BP(mean): 81 (26 Mar 2019 14:00) (70 - 107)  RR: 29 (26 Mar 2019 14:00) (14 - 36)  SpO2: 96% (26 Mar 2019 14:00) (92% - 98%)    03-25-19 @ 07:01  -  03-26-19 @ 07:00  --------------------------------------------------------  IN: 483.4 mL / OUT: 1125 mL / NET: -641.6 mL    03-26-19 @ 07:01  -  03-26-19 @ 15:25  --------------------------------------------------------  IN: 116.9 mL / OUT: 0 mL / NET: 116.9 mL      PHYSICAL EXAM:  GENERAL: NAD, well-developed  HEAD:  Atraumatic, Normocephalic  EYES: EOMI, PERRLA, conjunctiva and sclera clear  NECK: Supple, No JVD  CHEST/LUNG: Clear to auscultation bilaterally; No wheeze  HEART: Regular rate and rhythm; No murmurs, rubs, or gallops  ABDOMEN: Soft, Nontender, Nondistended; Bowel sounds present  EXTREMITIES:  2+ Peripheral Pulses, No clubbing, cyanosis, or edema  PSYCH: AAOx3  NEUROLOGY: non-focal  SKIN: No rashes or lesions    ===================== LABS =====================                        13.9   9.91  )-----------( 122      ( 26 Mar 2019 04:38 )             42.6     03-26    140  |  101  |  24<H>  ----------------------------<  103<H>  4.2   |  25  |  1.1    Ca    9.0      26 Mar 2019 04:38  Mg     2.3     03-26    TPro  5.6<L>  /  Alb  3.3<L>  /  TBili  0.5  /  DBili  x   /  AST  36  /  ALT  48<H>  /  AlkPhos  74  03-26    PT/INR - ( 26 Mar 2019 11:42 )   PT: 15.70 sec;   INR: 1.37 ratio         PTT - ( 26 Mar 2019 11:42 )  PTT:42.9 sec        CARDIAC MARKERS ( 25 Mar 2019 05:13 )  x     / 0.01 ng/mL / x     / x     / x          ================= MICROBIOLOGY ================    ================== IMAGING TO DATE ==================  TTE:  Left Ventricle: The left ventricular internal cavity size is mildly   increased. Left ventricular wall thickness is normal. Global LV systolic   function was severely decreased. Left ventricular ejection fraction, by   visual estimation, is <20%.  Right Ventricle: Normal right ventricular size and function.  Left Atrium: Moderately enlarged left atrium.  Pericardium: There is no evidence of pericardial effusion.  Mitral Valve: No evidence of mitral valve regurgitation is seen. Peak   transmitral mean gradient equals 7.1 mmHg, calculated mitral valve area   by pressure half time equals 3.73 cm² consistent with No evidence of   mitral stenosis. Mean Gradient across MV is 7.  Tricuspid Valve: Mild tricuspid regurgitation is visualized. The   tricuspid valve is normal.  Aortic Valve: Mild to moderate aortic valve regurgitation is seen. Peak   Av velocity is 2.27 m/s, mean transaortic gradient equals 12.1 mmHg. Peak   gradient acros AV is 20 with a mean of 12.  Pulmonic Valve: Mild pulmonic valve regurgitation.  Aorta: The aortic root is normal in size and structure. There is   dilatation of the ascending aorta and aortic root.  Additional Comments: A pacer wire is visualized in the right ventricle   and right atrium.    CXR: No radiographic evidence of acute cardiopulmonary disease.      ================== OTHER SIGNIFICANT FINDINGS ==================    ================== ALLERGIES ===================  No Known Allergies    ==================== MEDS =====================  amiodarone Infusion 0.5 mG/Min IV Continuous <Continuous>  atorvastatin 40 milliGRAM(s) Oral at bedtime  buDESOnide  80 MICROgram(s)/formoterol 4.5 MICROgram(s) Inhaler 2 Puff(s) Inhalation two times a day  dicyclomine 10 milliGRAM(s) Oral daily  enoxaparin Injectable 60 milliGRAM(s) SubCutaneous every 12 hours  metoprolol succinate ER 25 milliGRAM(s) Oral daily  mexiletine 200 milliGRAM(s) Oral two times a day  spironolactone 12.5 milliGRAM(s) Oral daily  tiotropium 18 MICROgram(s) Capsule 1 Capsule(s) Inhalation daily  warfarin 10 milliGRAM(s) Oral once    PRN MEDICATIONS      ================= CONSULTS ==================  Cardio:  Rec: Patient has had a repeat cardiac catheterization since his TAVR procedure and there is no indication to repeat a cardiac catheterization at this time.  I spoke with the patient extensively and he does not wish to repeat a cardiac catheterization at this time. I also spoke with EP, Dr. Vallecillo who is covering for Dr. Daily and was unaware that the patient had a coronary angiogram since his TAVR and that he also has a mechanical MVR implanted in 1995 at the time of his IWMI and SVG to the RCA.  The MVR was performed at [facility] by Dr. Franz due to papillary muscle injury from the myocardial infarction.  Maintain medical therapy.    EPS:  On Amio drip, con't IV, will re-evaluate in AM  No ablation plan at this time, will try antiarrhythmics first  No plan for cath, however, recommend ETT eval to r/o ischemic itiology of recurrent VT  We recommend BiV ICD upgrade this admission to improve CHF sxs  D/W Dr Guevara  ================= ASSESS/PLAN ==================  78 year old with PMH of MI in 1995, systolic CHF s/p AICD, COPD on home oxygen, (rheumatic heart disease), bioprosthetic aortic valve, melanoma and squamous cell carcinoma s/p resections presents to the ED after being shocked twice by AICD , AICD interrogation showed 2 episodes of monomorphic V-tach for which patient was appropriately shocked.    PLAN  # 2 episodes monomorphic vtach  - possible replacement of AICD to BiVICD on Thursday.  - c/w amiodarone drip    # CAD - c/w metoprolol    # CHF - c/w spironolactone    # COPD on home O2 - c/w symbicort and tiotropium    # MVR/AVR - INR 1.3 subtherapeutic, c/w coumadin and LMWH bridge. goal 2.5-3.5    # DLD - c/w statin      GI PPX:   DVT PPX:     DIET:    ACTIVITY:     ================= CODE STATUS =================                  () FULL CODE     |     () DNR     |     () DNI

## 2019-03-26 NOTE — PROGRESS NOTE ADULT - ASSESSMENT
79yo Male with extensive cardiac history including CAD, MI, CABG, MVR, TAVR, CHF, VT, AICD  Admitted with recurrent VT and ICD shocks    On Amio drip, con't IV, will re-evaluate in AM  No ablation plan at this time, will try antiarrhythmics first  No plan for cath, however, recommend ETT eval to r/o ischemic itiology of recurrent VT  We recommend BiV ICD upgrade this admission to improve CHF sxs  D/W Dr Guevara

## 2019-03-26 NOTE — PROGRESS NOTE ADULT - ASSESSMENT
# 2 episodes of monomorphic V-tach (rate 136-140)  - AICD fired appropriately  - likely related to 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 Had subsequent follow up with Dr. Daily during which medications were adjusted.   - continue metoprolol/ mexilitine, amiodarone bolus was given and patient was restarted on amiodarone drip according to EP recommendations   - At CCU and monitoring post AICD fire    # COPD on home O2 - stable   - Xopenex on hold , possible trigger for  V tech  - continue with Symbicort and Tiotropium once a day      # History of rheumatic heart disease s/p mechanical Mitral valve and bioprosthetic aortic valve.  - On coumadin to target INR of 2.5-3.5 , INR is 1.3 subtherapeutic, will bridge with therapeutic Lovenox till INR is therapeutic.     # Thoracic aortic aneurism  - CT angio showed Ascending thoracic aortic aneurysm measuring up to 4.8 cm and descending thoracic aortic aneurysm measuring 3.7 cm, stable.      # Incidental right renal lesion found on CT scan; Indeterminate right interpolar 1.1 cm renal lesion.   Out patient follow-up renal mass protocol CT or MRI is recommended in 3-6 months.    # Dyslipidemia  -Continue with Statin    # GI prophylaxis  -not indicated      # DVT prophylaxis  -On coumadin.

## 2019-03-26 NOTE — PROGRESS NOTE ADULT - SUBJECTIVE AND OBJECTIVE BOX
SUBJ:  Patient with AICD discharge on 2 occasions.    MEDICATIONS  (STANDING):  amiodarone Infusion 0.5 mG/Min (16.667 mL/Hr) IV Continuous <Continuous>  atorvastatin 40 milliGRAM(s) Oral at bedtime  buDESOnide  80 MICROgram(s)/formoterol 4.5 MICROgram(s) Inhaler 2 Puff(s) Inhalation two times a day  dicyclomine 10 milliGRAM(s) Oral daily  enoxaparin Injectable 60 milliGRAM(s) SubCutaneous every 12 hours  metoprolol succinate ER 25 milliGRAM(s) Oral daily  mexiletine 200 milliGRAM(s) Oral two times a day  spironolactone 12.5 milliGRAM(s) Oral daily  tiotropium 18 MICROgram(s) Capsule 1 Capsule(s) Inhalation daily    MEDICATIONS  (PRN):            Vital Signs Last 24 Hrs  T(C): 36.3 (26 Mar 2019 07:58), Max: 36.9 (25 Mar 2019 16:00)  T(F): 97.4 (26 Mar 2019 07:58), Max: 98.4 (25 Mar 2019 16:00)  HR: 78 (26 Mar 2019 07:58) (78 - 90)  BP: 102/65 (26 Mar 2019 07:58) (77/53 - 129/64)  BP(mean): 76 (26 Mar 2019 07:58) (61 - 107)  RR: 21 (26 Mar 2019 07:58) (14 - 36)  SpO2: 95% (26 Mar 2019 07:58) (92% - 100%)    REVIEW OF SYSTEMS:  · EXTREMITIES: No cyanosis, clubbing or edema  · VASCULAR: 	Equal and normal pulses (carotid, femoral, dorsalis pedis    ECG:NSR    TTE: See prior official reports    CONSTITUTIONAL: No fever, weight loss, or fatigue  Patient denies chest pain, shortness of breath or syncopal episodes.       PHYSICAL EXAM:  · CONSTITUTIONAL:	Well-developed, well nourished     ·RESPIRATORY:   airway patent; breath sounds equal; good air movement; respirations non-labored; clear to auscultation bilaterally; no chest wall tenderness; no intercostal retractions; no rales,rhonchi or wheeze  · CARDIOVASCULAR	regular rate and rhythm  no rub  Mechanical S1, accentuated S2  normal PMI Grade II/VI systolic murmur RSB.  LABS:                        13.9   9.91  )-----------( 122      ( 26 Mar 2019 04:38 )             42.6     03-26    140  |  101  |  24<H>  ----------------------------<  103<H>  4.2   |  25  |  1.1    Ca    9.0      26 Mar 2019 04:38  Mg     2.3     03-26    TPro  5.6<L>  /  Alb  3.3<L>  /  TBili  0.5  /  DBili  x   /  AST  36  /  ALT  48<H>  /  AlkPhos  74  03-26    CARDIAC MARKERS ( 25 Mar 2019 05:13 )  x     / 0.01 ng/mL / x     / x     / x          PT/INR - ( 25 Mar 2019 05:13 )   PT: 15.20 sec;   INR: 1.33 ratio         PTT - ( 25 Mar 2019 05:13 )  PTT:31.0 sec    I&O's Summary    25 Mar 2019 07:01  -  26 Mar 2019 07:00  --------------------------------------------------------  IN: 466.7 mL / OUT: 1125 mL / NET: -658.3 mL      IMPRESSION AND PLAN:  Ventricular Tachycardia  S/P AICD  Known cardiomyopathy  Prior IWMI in 1995  MVR(Mechanical)  with SVG to RCA  S/P TAVR at [facility] for AS  Rec: Patient has had a repeat cardiac catheterization since his TAVR procedure and there is no indication to repeat a cardiac catheterization at this time.  I spoke with the patient extensively and he does not wish to repeat a cardiac catheterization at this time. I also spoke with EP, Dr. Vallecillo who is covering for Dr. Daily and was unaware that the patient had a coronary angiogram since his TAVR and that he also has a mechanical MVR implanted in 1995 at the time of his IWMI and SVG to the RCA.  The MVR was performed at [facility] by Dr. Franz due to papillary muscle injury from the myocardial infarction.  Maintain medical therapy.  INR levels need to be therapeutic with INR of 2.5-3.5 given his mechanical MVR. Anticoagulate with LMWH until INR is at least 2.5

## 2019-03-26 NOTE — PROGRESS NOTE ADULT - SUBJECTIVE AND OBJECTIVE BOX
PROGRESS NOTE  Chief Complaint:  Patient is a 78y old  Male who presents with a chief complaint of shocked twice by AICD (25 Mar 2019 11:01)      HPI:  78 year old with PMH of MI in 1995, systolic CHF s/p AICD, COPD on home oxygen, (rheumatic heart disease), bioprosthetic aortic valve, melanoma and squamous cell carcinoma s/p resections presents to the ED for above CC during which the patient's ICD had fired twice. The first time was in his sleep on Friday night and he felt fine afterwards so he went back to bed. Second episode was last night and patient states that this one felt stronger than the one prior. He feels somewhat sore after the device shocked him. No exertional symptoms and no preceding symptoms to either shock. Only common thread was that he was asleep for both episodes. Patient denies any fever or chills , no SOB, no chest pain, no palpitation, or diaphoresis. He mentioned that he is complaint with his medications. (25 Mar 2019 11:01)      ALLERGIES:  No Known Allergies      HOSPITAL MEDICATIONS:  MEDICATIONS  (STANDING):  amiodarone Infusion 0.5 mG/Min (16.667 mL/Hr) IV Continuous <Continuous>  atorvastatin 40 milliGRAM(s) Oral at bedtime  buDESOnide  80 MICROgram(s)/formoterol 4.5 MICROgram(s) Inhaler 2 Puff(s) Inhalation two times a day  dicyclomine 10 milliGRAM(s) Oral daily  enoxaparin Injectable 60 milliGRAM(s) SubCutaneous every 12 hours  metoprolol succinate ER 25 milliGRAM(s) Oral daily  mexiletine 200 milliGRAM(s) Oral two times a day  spironolactone 12.5 milliGRAM(s) Oral daily  tiotropium 18 MICROgram(s) Capsule 1 Capsule(s) Inhalation daily    MEDICATIONS  (PRN):      PMHX/PSHX:  CAD (coronary artery disease)  Hyperlipidemia, unspecified hyperlipidemia type  Other irritable bowel syndrome  Former smoker, stopped smoking many years ago  Cancer  Osteoarthritis  ICD (implantable cardioverter-defibrillator) in place  Chronic systolic congestive heart failure  MI, old  ROMERO (dyspnea on exertion)  Chronic obstructive pulmonary disease, unspecified COPD type  Atrial fibrillation, unspecified type  S/P CABG x 1  H/O squamous cell carcinoma excision  Amputation finger  Right inguinal hernia  H/O surgery to major organs, presenting hazards to health  H/O surgery to heart and great vessels, presenting hazards to health  H/O surgery to major organs, presenting hazards to health      FAMILY HISTORY:  Cancer (Sibling)  Family history of heart disease (Sibling)      SOCIAL HISTORY:    REVIEW OF SYSTEMS:     General:  No wt loss, fevers, chills, night sweats, fatigue,   Eyes:  Good vision, no reported pain  ENT:  No sore throat, pain, runny nose, dysphagia  CV:  No pain, palpitations, hypo/hypertension  Resp:  No dyspnea, cough, tachypnea, wheezing  GI:  No pain, No nausea, No vomiting, No diarrhea, No constipation, No weight loss, No fever, No pruritis, No rectal bleeding, No tarry stools, No dysphagia,  :  No pain, bleeding, incontinence, nocturia  Muscle:  No pain, weakness  Neuro:  No weakness, tingling, memory problems  Psych:  No fatigue, insomnia, mood problems, depression  Endocrine:  No polyuria, polydipsia, cold/heat intolerance  Heme:  No petechiae, ecchymosis, easy bruisability  Skin:  No rash, tattoos, scars, edema      PHYSICAL EXAM:   Vital Signs Last 24 Hrs    T(C): 36.3 (26 Mar 2019 07:58), Max: 36.9 (25 Mar 2019 16:00)  T(F): 97.4 (26 Mar 2019 07:58), Max: 98.4 (25 Mar 2019 16:00)  HR: 78 (26 Mar 2019 07:58) (78 - 90)  BP: 102/65 (26 Mar 2019 07:58) (77/53 - 129/64)  BP(mean): 76 (26 Mar 2019 07:58) (61 - 107)  RR: 21 (26 Mar 2019 07:58) (14 - 36)  SpO2: 95% (26 Mar 2019 07:58) (92% - 100%)     GENERAL:  Appears stated age, well-groomed, well-nourished, no distress  HEENT:  NC/AT,  conjunctivae clear and pink, no thyromegaly, nodules, adenopathy, no JVD, sclera -anicteric  CHEST:  Full & symmetric excursion, no increased effort, breath sounds clear  HEART:  Regular rhythm, S1, S2, no murmur/rub/S3/S4, no abdominal bruit, no edema  ABDOMEN:  Soft, non-tender, non-distended, normoactive bowel sounds,  no masses ,no hepato-splenomegaly, no signs of chronic liver disease  EXTEREMITIES:  no cyanosis,clubbing or edema  SKIN:  No rash/erythema/ecchymoses/petechiae/wounds/abscess/warm/dry  NEURO:  Alert, oriented, no asterixis, no tremor, no encephalopathy    LABS:                        13.9   9.91  )-----------( 122      ( 26 Mar 2019 04:38 )             42.6     03-26    140  |  101  |  24<H>  ----------------------------<  103<H>  4.2   |  25  |  1.1    Ca    9.0      26 Mar 2019 04:38  Mg     2.3     03-26    TPro  5.6<L>  /  Alb  3.3<L>  /  TBili  0.5  /  DBili  x   /  AST  36  /  ALT  48<H>  /  AlkPhos  74  03-26    LIVER FUNCTIONS - ( 26 Mar 2019 04:38 )  Alb: 3.3 g/dL / Pro: 5.6 g/dL / ALK PHOS: 74 U/L / ALT: 48 U/L / AST: 36 U/L / GGT: x           PT/INR - ( 25 Mar 2019 05:13 )   PT: 15.20 sec;   INR: 1.33 ratio         PTT - ( 25 Mar 2019 05:13 )  PTT:31.0 sec

## 2019-03-27 ENCOUNTER — TRANSCRIPTION ENCOUNTER (OUTPATIENT)
Age: 79
End: 2019-03-27

## 2019-03-27 VITALS — SYSTOLIC BLOOD PRESSURE: 100 MMHG | DIASTOLIC BLOOD PRESSURE: 57 MMHG

## 2019-03-27 LAB
ALBUMIN SERPL ELPH-MCNC: 2.9 G/DL — LOW (ref 3.5–5.2)
ALP SERPL-CCNC: 80 U/L — SIGNIFICANT CHANGE UP (ref 30–115)
ALT FLD-CCNC: 42 U/L — HIGH (ref 0–41)
ANION GAP SERPL CALC-SCNC: 9 MMOL/L — SIGNIFICANT CHANGE UP (ref 7–14)
AST SERPL-CCNC: 27 U/L — SIGNIFICANT CHANGE UP (ref 0–41)
BASOPHILS # BLD AUTO: 0.05 K/UL — SIGNIFICANT CHANGE UP (ref 0–0.2)
BASOPHILS NFR BLD AUTO: 0.5 % — SIGNIFICANT CHANGE UP (ref 0–1)
BILIRUB SERPL-MCNC: 0.5 MG/DL — SIGNIFICANT CHANGE UP (ref 0.2–1.2)
BUN SERPL-MCNC: 15 MG/DL — SIGNIFICANT CHANGE UP (ref 10–20)
CALCIUM SERPL-MCNC: 8.9 MG/DL — SIGNIFICANT CHANGE UP (ref 8.5–10.1)
CHLORIDE SERPL-SCNC: 101 MMOL/L — SIGNIFICANT CHANGE UP (ref 98–110)
CO2 SERPL-SCNC: 29 MMOL/L — SIGNIFICANT CHANGE UP (ref 17–32)
CREAT SERPL-MCNC: 1.1 MG/DL — SIGNIFICANT CHANGE UP (ref 0.7–1.5)
EOSINOPHIL # BLD AUTO: 0.19 K/UL — SIGNIFICANT CHANGE UP (ref 0–0.7)
EOSINOPHIL NFR BLD AUTO: 1.9 % — SIGNIFICANT CHANGE UP (ref 0–8)
GLUCOSE SERPL-MCNC: 104 MG/DL — HIGH (ref 70–99)
HCT VFR BLD CALC: 42.3 % — SIGNIFICANT CHANGE UP (ref 42–52)
HGB BLD-MCNC: 13.6 G/DL — LOW (ref 14–18)
IMM GRANULOCYTES NFR BLD AUTO: 0.9 % — HIGH (ref 0.1–0.3)
INR BLD: 1.9 RATIO — HIGH (ref 0.65–1.3)
LYMPHOCYTES # BLD AUTO: 1.04 K/UL — LOW (ref 1.2–3.4)
LYMPHOCYTES # BLD AUTO: 10.6 % — LOW (ref 20.5–51.1)
MAGNESIUM SERPL-MCNC: 2.1 MG/DL — SIGNIFICANT CHANGE UP (ref 1.8–2.4)
MCHC RBC-ENTMCNC: 28.3 PG — SIGNIFICANT CHANGE UP (ref 27–31)
MCHC RBC-ENTMCNC: 32.2 G/DL — SIGNIFICANT CHANGE UP (ref 32–37)
MCV RBC AUTO: 88.1 FL — SIGNIFICANT CHANGE UP (ref 80–94)
MONOCYTES # BLD AUTO: 1.02 K/UL — HIGH (ref 0.1–0.6)
MONOCYTES NFR BLD AUTO: 10.4 % — HIGH (ref 1.7–9.3)
NEUTROPHILS # BLD AUTO: 7.39 K/UL — HIGH (ref 1.4–6.5)
NEUTROPHILS NFR BLD AUTO: 75.7 % — HIGH (ref 42.2–75.2)
NRBC # BLD: 0 /100 WBCS — SIGNIFICANT CHANGE UP (ref 0–0)
PLATELET # BLD AUTO: 123 K/UL — LOW (ref 130–400)
POTASSIUM SERPL-MCNC: 4.7 MMOL/L — SIGNIFICANT CHANGE UP (ref 3.5–5)
POTASSIUM SERPL-SCNC: 4.7 MMOL/L — SIGNIFICANT CHANGE UP (ref 3.5–5)
PROT SERPL-MCNC: 5.5 G/DL — LOW (ref 6–8)
PROTHROM AB SERPL-ACNC: 21.7 SEC — HIGH (ref 9.95–12.87)
RBC # BLD: 4.8 M/UL — SIGNIFICANT CHANGE UP (ref 4.7–6.1)
RBC # FLD: 13.2 % — SIGNIFICANT CHANGE UP (ref 11.5–14.5)
SODIUM SERPL-SCNC: 139 MMOL/L — SIGNIFICANT CHANGE UP (ref 135–146)
WBC # BLD: 9.78 K/UL — SIGNIFICANT CHANGE UP (ref 4.8–10.8)
WBC # FLD AUTO: 9.78 K/UL — SIGNIFICANT CHANGE UP (ref 4.8–10.8)

## 2019-03-27 RX ORDER — AMIODARONE HYDROCHLORIDE 400 MG/1
2 TABLET ORAL
Qty: 14 | Refills: 0 | OUTPATIENT
Start: 2019-03-27 | End: 2019-04-02

## 2019-03-27 RX ORDER — ATORVASTATIN CALCIUM 80 MG/1
1 TABLET, FILM COATED ORAL
Qty: 30 | Refills: 0
Start: 2019-03-27 | End: 2019-04-25

## 2019-03-27 RX ORDER — WARFARIN SODIUM 2.5 MG/1
11 TABLET ORAL ONCE
Qty: 0 | Refills: 0 | Status: DISCONTINUED | OUTPATIENT
Start: 2019-03-27 | End: 2019-03-27

## 2019-03-27 RX ORDER — WARFARIN SODIUM 2.5 MG/1
1 TABLET ORAL
Qty: 0 | Refills: 0 | COMMUNITY

## 2019-03-27 RX ORDER — SIMVASTATIN 20 MG/1
1 TABLET, FILM COATED ORAL
Qty: 0 | Refills: 0 | COMMUNITY

## 2019-03-27 RX ORDER — AMIODARONE HYDROCHLORIDE 400 MG/1
200 TABLET ORAL
Qty: 0 | Refills: 0 | Status: DISCONTINUED | OUTPATIENT
Start: 2019-03-27 | End: 2019-03-27

## 2019-03-27 RX ORDER — AMIODARONE HYDROCHLORIDE 400 MG/1
400 TABLET ORAL DAILY
Qty: 0 | Refills: 0 | Status: DISCONTINUED | OUTPATIENT
Start: 2019-03-27 | End: 2019-03-27

## 2019-03-27 RX ORDER — WARFARIN SODIUM 2.5 MG/1
1 TABLET ORAL
Qty: 3 | Refills: 0 | OUTPATIENT
Start: 2019-03-27 | End: 2019-03-29

## 2019-03-27 RX ORDER — ENOXAPARIN SODIUM 100 MG/ML
60 INJECTION SUBCUTANEOUS
Qty: 360 | Refills: 0 | OUTPATIENT
Start: 2019-03-27 | End: 2019-03-29

## 2019-03-27 RX ORDER — AMIODARONE HYDROCHLORIDE 400 MG/1
2 TABLET ORAL
Qty: 60 | Refills: 0 | OUTPATIENT
Start: 2019-03-27 | End: 2019-04-25

## 2019-03-27 RX ORDER — MEXILETINE HYDROCHLORIDE 150 MG/1
1 CAPSULE ORAL
Qty: 60 | Refills: 0
Start: 2019-03-27 | End: 2019-04-25

## 2019-03-27 RX ADMIN — TIOTROPIUM BROMIDE 1 CAPSULE(S): 18 CAPSULE ORAL; RESPIRATORY (INHALATION) at 09:12

## 2019-03-27 RX ADMIN — SPIRONOLACTONE 12.5 MILLIGRAM(S): 25 TABLET, FILM COATED ORAL at 06:26

## 2019-03-27 RX ADMIN — Medication 25 MILLIGRAM(S): at 06:26

## 2019-03-27 RX ADMIN — Medication 10 MILLIGRAM(S): at 11:38

## 2019-03-27 RX ADMIN — MEXILETINE HYDROCHLORIDE 200 MILLIGRAM(S): 150 CAPSULE ORAL at 05:57

## 2019-03-27 RX ADMIN — BUDESONIDE AND FORMOTEROL FUMARATE DIHYDRATE 2 PUFF(S): 160; 4.5 AEROSOL RESPIRATORY (INHALATION) at 08:00

## 2019-03-27 RX ADMIN — AMIODARONE HYDROCHLORIDE 400 MILLIGRAM(S): 400 TABLET ORAL at 13:25

## 2019-03-27 RX ADMIN — ENOXAPARIN SODIUM 60 MILLIGRAM(S): 100 INJECTION SUBCUTANEOUS at 05:57

## 2019-03-27 RX ADMIN — BUDESONIDE AND FORMOTEROL FUMARATE DIHYDRATE 2 PUFF(S): 160; 4.5 AEROSOL RESPIRATORY (INHALATION) at 05:27

## 2019-03-27 NOTE — DISCHARGE NOTE PROVIDER - CARE PROVIDER_API CALL
Mario Levin)  Cardiovascular Disease; Internal Medicine; Interventional Cardiology  [address] [place] [address], [place] [address]  Phone: [phone]  Fax: [phone]  Follow Up Time:     Kamryn Fraser)  Cardiology; Internal Medicine  [address] [place] [address], [place] [address]  Phone: [phone]  Fax: [phone]  Follow Up Time:

## 2019-03-27 NOTE — DISCHARGE NOTE NURSING/CASE MANAGEMENT/SOCIAL WORK - NSDCDPATPORTLINK_GEN_ALL_CORE
You can access the [facility] Patient Portal, offered by [facility], by registering with the following website: [email or web address]

## 2019-03-27 NOTE — DISCHARGE NOTE PROVIDER - NSDCCPCAREPLAN_GEN_ALL_CORE_FT
PRINCIPAL DISCHARGE DIAGNOSIS  Diagnosis: Ventricular tachycardia  Assessment and Plan of Treatment: You were found to have slow ventricular tachycardia with two appropriately shocks. You were evaluated by Cardiology and EPS. An upgrade of your ICD device was offered but you declined. Your device was interrogated and it is functioning properly. We started you on a medication call Amiodarone to control your heart rate. Please continue the prescribed medication and follow up with your Cardiologist, Electrophysiologist, and PCP.      SECONDARY DISCHARGE DIAGNOSES  Diagnosis: H/O mitral valve replacement  Assessment and Plan of Treatment: You have a history of mitral valve replacement. However your INR level is subtherapeutic at 1.9. Your target INR level is 2.5 to 3.5. You will need to take Lovenox injection two times per day and continue your home dose coumadin. Please follow up with Coumadin clinic in 2-3 days to determine your INR level.

## 2019-03-27 NOTE — PROGRESS NOTE ADULT - REASON FOR ADMISSION
shocked twice by AICD
shocked twice by AICD found ot have V tech

## 2019-03-27 NOTE — PROGRESS NOTE ADULT - ASSESSMENT
77yo Male with extensive cardiac history including CAD, MI, CABG, MVR, TAVR, CHF, VT, AICD  Admitted with recurrent VT and ICD shocks    On Amio drip, con't IV, , switch to PO dose this morning  No ablation plan at this time, will try antiarrhythmics first on Amio and Mexiletine  Maintain electrolytes K>4.0 Mg >2.0  LFT at the base line  Please check TSH FT4  We recommend BiV ICD upgrade this admission to improve CHF sxs, however patient refuses at this time  Will f/u as out-pt

## 2019-03-27 NOTE — DISCHARGE NOTE PROVIDER - NSDCHC_MEDRECSTATUS_GEN_ALL_CORE
Admission Reconciliation is Not Complete  Discharge Reconciliation is Not Complete Admission Reconciliation is Completed  Discharge Reconciliation is Not Complete

## 2019-03-28 ENCOUNTER — OUTPATIENT (OUTPATIENT)
Dept: OUTPATIENT SERVICES | Facility: HOSPITAL | Age: 79
LOS: 1 days | Discharge: HOME | End: 2019-03-28

## 2019-03-28 DIAGNOSIS — Z98.890 OTHER SPECIFIED POSTPROCEDURAL STATES: Chronic | ICD-10-CM

## 2019-03-28 DIAGNOSIS — Z95.1 PRESENCE OF AORTOCORONARY BYPASS GRAFT: Chronic | ICD-10-CM

## 2019-03-28 DIAGNOSIS — K40.90 UNILATERAL INGUINAL HERNIA, WITHOUT OBSTRUCTION OR GANGRENE, NOT SPECIFIED AS RECURRENT: Chronic | ICD-10-CM

## 2019-03-28 DIAGNOSIS — Z79.01 LONG TERM (CURRENT) USE OF ANTICOAGULANTS: ICD-10-CM

## 2019-03-28 DIAGNOSIS — Z95.2 PRESENCE OF PROSTHETIC HEART VALVE: ICD-10-CM

## 2019-03-28 DIAGNOSIS — S68.119A COMPLETE TRAUMATIC METACARPOPHALANGEAL AMPUTATION OF UNSPECIFIED FINGER, INITIAL ENCOUNTER: Chronic | ICD-10-CM

## 2019-03-28 LAB
POCT INR: 2.5 RATIO — HIGH (ref 0.9–1.2)
POCT PT: 29.6 SEC — HIGH (ref 10–13.4)

## 2019-03-29 DIAGNOSIS — Z95.1 PRESENCE OF AORTOCORONARY BYPASS GRAFT: ICD-10-CM

## 2019-03-29 DIAGNOSIS — Z87.891 PERSONAL HISTORY OF NICOTINE DEPENDENCE: ICD-10-CM

## 2019-03-29 DIAGNOSIS — I42.9 CARDIOMYOPATHY, UNSPECIFIED: ICD-10-CM

## 2019-03-29 DIAGNOSIS — Z95.810 PRESENCE OF AUTOMATIC (IMPLANTABLE) CARDIAC DEFIBRILLATOR: ICD-10-CM

## 2019-03-29 DIAGNOSIS — Z90.49 ACQUIRED ABSENCE OF OTHER SPECIFIED PARTS OF DIGESTIVE TRACT: ICD-10-CM

## 2019-03-29 DIAGNOSIS — Z85.828 PERSONAL HISTORY OF OTHER MALIGNANT NEOPLASM OF SKIN: ICD-10-CM

## 2019-03-29 DIAGNOSIS — I08.0 RHEUMATIC DISORDERS OF BOTH MITRAL AND AORTIC VALVES: ICD-10-CM

## 2019-03-29 DIAGNOSIS — R79.1 ABNORMAL COAGULATION PROFILE: ICD-10-CM

## 2019-03-29 DIAGNOSIS — I47.2 VENTRICULAR TACHYCARDIA: ICD-10-CM

## 2019-03-29 DIAGNOSIS — Z86.74 PERSONAL HISTORY OF SUDDEN CARDIAC ARREST: ICD-10-CM

## 2019-03-29 DIAGNOSIS — E78.5 HYPERLIPIDEMIA, UNSPECIFIED: ICD-10-CM

## 2019-03-29 DIAGNOSIS — M19.90 UNSPECIFIED OSTEOARTHRITIS, UNSPECIFIED SITE: ICD-10-CM

## 2019-03-29 DIAGNOSIS — Z99.81 DEPENDENCE ON SUPPLEMENTAL OXYGEN: ICD-10-CM

## 2019-03-29 DIAGNOSIS — R09.02 HYPOXEMIA: ICD-10-CM

## 2019-03-29 DIAGNOSIS — J44.9 CHRONIC OBSTRUCTIVE PULMONARY DISEASE, UNSPECIFIED: ICD-10-CM

## 2019-03-29 DIAGNOSIS — Z95.3 PRESENCE OF XENOGENIC HEART VALVE: ICD-10-CM

## 2019-03-29 DIAGNOSIS — I25.2 OLD MYOCARDIAL INFARCTION: ICD-10-CM

## 2019-03-29 DIAGNOSIS — K58.9 IRRITABLE BOWEL SYNDROME WITHOUT DIARRHEA: ICD-10-CM

## 2019-03-29 DIAGNOSIS — Z53.29 PROCEDURE AND TREATMENT NOT CARRIED OUT BECAUSE OF PATIENT'S DECISION FOR OTHER REASONS: ICD-10-CM

## 2019-03-29 DIAGNOSIS — N28.9 DISORDER OF KIDNEY AND URETER, UNSPECIFIED: ICD-10-CM

## 2019-03-29 DIAGNOSIS — I50.22 CHRONIC SYSTOLIC (CONGESTIVE) HEART FAILURE: ICD-10-CM

## 2019-03-29 DIAGNOSIS — I25.10 ATHEROSCLEROTIC HEART DISEASE OF NATIVE CORONARY ARTERY WITHOUT ANGINA PECTORIS: ICD-10-CM

## 2019-03-29 DIAGNOSIS — I71.2 THORACIC AORTIC ANEURYSM, WITHOUT RUPTURE: ICD-10-CM

## 2019-03-30 ENCOUNTER — INPATIENT (INPATIENT)
Facility: HOSPITAL | Age: 79
LOS: 4 days | Discharge: HOME | End: 2019-04-04
Attending: INTERNAL MEDICINE | Admitting: INTERNAL MEDICINE
Payer: COMMERCIAL

## 2019-03-30 VITALS
HEIGHT: 63 IN | TEMPERATURE: 98 F | WEIGHT: 143.08 LBS | DIASTOLIC BLOOD PRESSURE: 79 MMHG | SYSTOLIC BLOOD PRESSURE: 108 MMHG | OXYGEN SATURATION: 100 % | HEART RATE: 80 BPM | RESPIRATION RATE: 18 BRPM

## 2019-03-30 DIAGNOSIS — Z98.890 OTHER SPECIFIED POSTPROCEDURAL STATES: Chronic | ICD-10-CM

## 2019-03-30 DIAGNOSIS — K40.90 UNILATERAL INGUINAL HERNIA, WITHOUT OBSTRUCTION OR GANGRENE, NOT SPECIFIED AS RECURRENT: Chronic | ICD-10-CM

## 2019-03-30 DIAGNOSIS — S68.119A COMPLETE TRAUMATIC METACARPOPHALANGEAL AMPUTATION OF UNSPECIFIED FINGER, INITIAL ENCOUNTER: Chronic | ICD-10-CM

## 2019-03-30 DIAGNOSIS — Z95.1 PRESENCE OF AORTOCORONARY BYPASS GRAFT: Chronic | ICD-10-CM

## 2019-03-30 LAB
ALBUMIN SERPL ELPH-MCNC: 4 G/DL — SIGNIFICANT CHANGE UP (ref 3.5–5.2)
ALP SERPL-CCNC: 93 U/L — SIGNIFICANT CHANGE UP (ref 30–115)
ALT FLD-CCNC: 49 U/L — HIGH (ref 0–41)
ANION GAP SERPL CALC-SCNC: 17 MMOL/L — HIGH (ref 7–14)
APTT BLD: 51.8 SEC — HIGH (ref 27–39.2)
AST SERPL-CCNC: 37 U/L — SIGNIFICANT CHANGE UP (ref 0–41)
BASOPHILS # BLD AUTO: 0.04 K/UL — SIGNIFICANT CHANGE UP (ref 0–0.2)
BASOPHILS NFR BLD AUTO: 0.5 % — SIGNIFICANT CHANGE UP (ref 0–1)
BILIRUB SERPL-MCNC: 0.4 MG/DL — SIGNIFICANT CHANGE UP (ref 0.2–1.2)
BUN SERPL-MCNC: 27 MG/DL — HIGH (ref 10–20)
CALCIUM SERPL-MCNC: 9.1 MG/DL — SIGNIFICANT CHANGE UP (ref 8.5–10.1)
CHLORIDE SERPL-SCNC: 98 MMOL/L — SIGNIFICANT CHANGE UP (ref 98–110)
CO2 SERPL-SCNC: 26 MMOL/L — SIGNIFICANT CHANGE UP (ref 17–32)
CREAT SERPL-MCNC: 1.4 MG/DL — SIGNIFICANT CHANGE UP (ref 0.7–1.5)
EOSINOPHIL # BLD AUTO: 0.13 K/UL — SIGNIFICANT CHANGE UP (ref 0–0.7)
EOSINOPHIL NFR BLD AUTO: 1.7 % — SIGNIFICANT CHANGE UP (ref 0–8)
GLUCOSE SERPL-MCNC: 112 MG/DL — HIGH (ref 70–99)
HCT VFR BLD CALC: 46.6 % — SIGNIFICANT CHANGE UP (ref 42–52)
HGB BLD-MCNC: 14.8 G/DL — SIGNIFICANT CHANGE UP (ref 14–18)
IMM GRANULOCYTES NFR BLD AUTO: 0.7 % — HIGH (ref 0.1–0.3)
INR BLD: 3.52 RATIO — HIGH (ref 0.65–1.3)
LYMPHOCYTES # BLD AUTO: 1.28 K/UL — SIGNIFICANT CHANGE UP (ref 1.2–3.4)
LYMPHOCYTES # BLD AUTO: 16.8 % — LOW (ref 20.5–51.1)
MAGNESIUM SERPL-MCNC: 1.7 MG/DL — LOW (ref 1.8–2.4)
MCHC RBC-ENTMCNC: 28.1 PG — SIGNIFICANT CHANGE UP (ref 27–31)
MCHC RBC-ENTMCNC: 31.8 G/DL — LOW (ref 32–37)
MCV RBC AUTO: 88.6 FL — SIGNIFICANT CHANGE UP (ref 80–94)
MONOCYTES # BLD AUTO: 0.87 K/UL — HIGH (ref 0.1–0.6)
MONOCYTES NFR BLD AUTO: 11.4 % — HIGH (ref 1.7–9.3)
NEUTROPHILS # BLD AUTO: 5.24 K/UL — SIGNIFICANT CHANGE UP (ref 1.4–6.5)
NEUTROPHILS NFR BLD AUTO: 68.9 % — SIGNIFICANT CHANGE UP (ref 42.2–75.2)
NRBC # BLD: 0 /100 WBCS — SIGNIFICANT CHANGE UP (ref 0–0)
PLATELET # BLD AUTO: 171 K/UL — SIGNIFICANT CHANGE UP (ref 130–400)
POTASSIUM SERPL-MCNC: 4.1 MMOL/L — SIGNIFICANT CHANGE UP (ref 3.5–5)
POTASSIUM SERPL-SCNC: 4.1 MMOL/L — SIGNIFICANT CHANGE UP (ref 3.5–5)
PROT SERPL-MCNC: 7 G/DL — SIGNIFICANT CHANGE UP (ref 6–8)
PROTHROM AB SERPL-ACNC: 40 SEC — HIGH (ref 9.95–12.87)
RBC # BLD: 5.26 M/UL — SIGNIFICANT CHANGE UP (ref 4.7–6.1)
RBC # FLD: 13.5 % — SIGNIFICANT CHANGE UP (ref 11.5–14.5)
SODIUM SERPL-SCNC: 141 MMOL/L — SIGNIFICANT CHANGE UP (ref 135–146)
TROPONIN T SERPL-MCNC: <0.01 NG/ML — SIGNIFICANT CHANGE UP
WBC # BLD: 7.61 K/UL — SIGNIFICANT CHANGE UP (ref 4.8–10.8)
WBC # FLD AUTO: 7.61 K/UL — SIGNIFICANT CHANGE UP (ref 4.8–10.8)

## 2019-03-30 RX ORDER — BUDESONIDE AND FORMOTEROL FUMARATE DIHYDRATE 160; 4.5 UG/1; UG/1
2 AEROSOL RESPIRATORY (INHALATION)
Qty: 0 | Refills: 0 | COMMUNITY

## 2019-03-30 RX ORDER — MAGNESIUM SULFATE 500 MG/ML
2 VIAL (ML) INJECTION ONCE
Qty: 0 | Refills: 0 | Status: COMPLETED | OUTPATIENT
Start: 2019-03-30 | End: 2019-03-30

## 2019-03-30 RX ORDER — AMIODARONE HYDROCHLORIDE 400 MG/1
150 TABLET ORAL ONCE
Qty: 0 | Refills: 0 | Status: COMPLETED | OUTPATIENT
Start: 2019-03-30 | End: 2019-03-30

## 2019-03-30 RX ORDER — AMIODARONE HYDROCHLORIDE 400 MG/1
0.5 TABLET ORAL
Qty: 900 | Refills: 0 | Status: DISCONTINUED | OUTPATIENT
Start: 2019-03-30 | End: 2019-03-31

## 2019-03-30 RX ORDER — SPIRONOLACTONE 25 MG/1
12.5 TABLET, FILM COATED ORAL DAILY
Qty: 0 | Refills: 0 | Status: DISCONTINUED | OUTPATIENT
Start: 2019-03-30 | End: 2019-04-03

## 2019-03-30 RX ORDER — MEXILETINE HYDROCHLORIDE 150 MG/1
200 CAPSULE ORAL
Qty: 0 | Refills: 0 | Status: DISCONTINUED | OUTPATIENT
Start: 2019-03-30 | End: 2019-04-04

## 2019-03-30 RX ORDER — AMIODARONE HYDROCHLORIDE 400 MG/1
1 TABLET ORAL
Qty: 900 | Refills: 0 | Status: DISCONTINUED | OUTPATIENT
Start: 2019-03-30 | End: 2019-03-31

## 2019-03-30 RX ORDER — METOPROLOL TARTRATE 50 MG
25 TABLET ORAL DAILY
Qty: 0 | Refills: 0 | Status: DISCONTINUED | OUTPATIENT
Start: 2019-03-30 | End: 2019-04-03

## 2019-03-30 RX ORDER — ATORVASTATIN CALCIUM 80 MG/1
40 TABLET, FILM COATED ORAL AT BEDTIME
Qty: 0 | Refills: 0 | Status: DISCONTINUED | OUTPATIENT
Start: 2019-03-30 | End: 2019-04-04

## 2019-03-30 RX ADMIN — ATORVASTATIN CALCIUM 40 MILLIGRAM(S): 80 TABLET, FILM COATED ORAL at 23:09

## 2019-03-30 RX ADMIN — Medication 50 GRAM(S): at 18:44

## 2019-03-30 RX ADMIN — AMIODARONE HYDROCHLORIDE 33.33 MG/MIN: 400 TABLET ORAL at 18:00

## 2019-03-30 RX ADMIN — AMIODARONE HYDROCHLORIDE 618 MILLIGRAM(S): 400 TABLET ORAL at 18:00

## 2019-03-30 NOTE — ED PROVIDER NOTE - CRITICAL CARE PROVIDED
additional history taking/conducted a detailed discussion of DNR status/direct patient care (not related to procedure)/consult w/ pt's family directly relating to pts condition/interpretation of diagnostic studies/consultation with other physicians/documentation

## 2019-03-30 NOTE — H&P ADULT - NSHPSOCIALHISTORY_GEN_ALL_CORE
Negative Quit smoking 1995 at the time of MI. No active tobacco use. No alcohol use. No illicit drug use.

## 2019-03-30 NOTE — H&P ADULT - HISTORY OF PRESENT ILLNESS
78 year old with PMH of MI in 1995, systolic CHF s/p AICD, COPD on home oxygen, (rheumatic heart disease), bioprosthetic aortic valve, melanoma and squamous cell carcinoma s/p resections presents to the ED after AICD firing. Patient was recently here in the ED for the AICD firing for two times. He was sent with the PO Amio and Mexilitine and BiV ICD was recommended but patient refused.      In the ED patient was seen by the cardiology fellow. AICD was interrogated. Pt had vtech. ATP and was shocked. 78 year old with PMH of MI in 1995, systolic CHF s/p AICD, COPD on PRN home oxygen, (rheumatic heart disease), bioprosthetic aortic valve, melanoma and squamous cell carcinoma s/p resections presents to the ED after AICD firing. Patient was using ipratropium nebulizer. Ot denies any chest pain, palpitation. Patient was recently here in the ED for the AICD firing for two times. He was sent with the PO Amio and Mexilitine and BiV ICD was recommended.    In the ED patient was seen by the cardiology fellow. AICD was interrogated. Pt had vtech. ATP and shock was delivered. 78 year old with PMH of MI in 1995, systolic CHF s/p AICD, COPD on PRN home oxygen, (rheumatic heart disease), bioprosthetic aortic valve, melanoma and squamous cell carcinoma s/p resections presents to the ED after AICD firing. Patient was using ipratropium nebulizer. PT denies any chest pain, palpitation. Patient was recently here in the ED for the AICD firing for two times. He was sent with the PO Amio and Mexilitine and BiV ICD was recommended.    In the ED patient was seen by the cardiology fellow. AICD was interrogated. Pt had vtech. ATP and shock was delivered.   In the ED patients vitals were stable. 78 year old with PMH of MI in 1995, systolic CHF s/p AICD, COPD on PRN home oxygen, (rheumatic heart disease), bioprosthetic aortic valve, melanoma and squamous cell carcinoma s/p resections presents to the ED after an episode AICD firing while patient was using ipratropium nebulizer on the day of presentation. Pt denies any chest pain, palpitation. Patient was discharged 2 days ago from hospital after evaluation of two episode of AICD firing found to be Vtach episodes. Pt was sent with oral Amiodarone and Mexiletine and BiV ICD was recommended but refused at the time, noted to be high risk for anesthesia and ablative procedure.     In the ED patient was seen by the cardiology fellow. AICD was interrogated. Pt had vtech. ATP and shock was delivered.   In the ED patients vitals were stable. 78 year old with PMH of MI in 1995, systolic CHF s/p AICD, COPD on PRN home oxygen, (rheumatic heart disease), bioprosthetic aortic valve, melanoma and squamous cell carcinoma s/p resections presents to the ED after an episode AICD firing while patient was using ipratropium nebulizer on the day of presentation. Pt denies any chest pain, palpitation. Patient was discharged 2 days ago from hospital after evaluation of two episode of AICD firing found to be Vtach episodes. Pt was sent with oral Amiodarone and Mexiletine and BiV ICD was recommended but pt wanted to postpone it to later at the time. Pt was also noted to be high risk for anesthesia and ablative procedure.     In the ED patient was seen by the cardiology fellow. AICD was interrogated. Pt had vtech. ATP and shock was delivered.   In the ED patients vitals were stable.

## 2019-03-30 NOTE — ED PROVIDER NOTE - CARE PLAN
Principal Discharge DX:	Ventricular tachycardia  Secondary Diagnosis:	AICD discharge  Secondary Diagnosis:	Hypomagnesemia

## 2019-03-30 NOTE — H&P ADULT - ATTENDING COMMENTS
Patient is seen and examined independently at bedside. I agree with the resident's note, history and plan except as below.    PHYSICAL EXAM:  CONSTITUTIONAL: NAD, well-groomed, well-developed.  HEAD:  Atraumatic, Normocephalic.  EYES: EOMI, PERRLA, conjunctiva and sclera clear.  ENMT: Moist mucous membranes, No lesions.  NERVOUS SYSTEM:  Alert & Oriented X3, Good concentration; No limb weakness or numbness.  RESPIRATORY: Left basilar crackles otherwise, clear to ascultation.   CARDIOVASCULAR: Regular rate and rhythm; No significant murmur.  GASTROINTESTINAL: Soft, Nontender, Nondistended; .  MUSCULOSKELETAL: No joint swelling or tenderness. No neck or back pain.  EXTREMITIES: No clubbing, cyanosis, or edema.  LYMPH: No cervical lymphadenopathy noted.  SKIN: No rashes or lesions. Left heel crack.    EKG showed AV paced rhythm  CXR reviewed no focal opacity or significant pleural effusion    # Recurrent Vtach s/p AICD firing   - monitor in CCU, telemetry  - c/w amiodarone drip  - EP evaluation   - c/w metoprolol, mexiletine, amiodarone   - monitor electrolytes, keep Mg around 2 and K around 4  - pt is agreeable for BIV-ICD placement, follow up EP  - hold off all nebs treatment for now since likely triggered by ipratropium this time and suspected to be triggered by albuterol last time during hospital stay    # CAD, HFrEF, stable, euvolemic on exam  - normally borderline low BP as per patient  - c/w diuretic regimen torsemide and spironolactone    # Hypomagnesemia  - Mg repeated in ED, recheck magnesium in AM, replete prn    # h/o valvular heart disease with mechanical MVR and TAVR  - c/w coumadin, monitor INR, goal 2.5-3.5    # Mild YULIANA over CKD Stage 2, likely CKD variation   - close monitoring BMP, strict I+Os  - will continue with home diuretics given that pt does not have sign of dehydration     # COPD on home O2 - stable  # DLD - c/w statin    All labs, radiologic studies, vitals, orders and medications list reviewed.

## 2019-03-30 NOTE — H&P ADULT - NSHPLABSRESULTS_GEN_ALL_CORE
14.8   7.61  )-----------( 171      ( 30 Mar 2019 17:40 )             46.6         03-30    141  |  98  |  27<H>  ----------------------------<  112<H>  4.1   |  26  |  1.4    Ca    9.1      30 Mar 2019 17:40  Mg     1.7     03-30    TPro  7.0  /  Alb  4.0  /  TBili  0.4  /  DBili  x   /  AST  37  /  ALT  49<H>  /  AlkPhos  93  03-30

## 2019-03-30 NOTE — ED ADULT NURSE NOTE - NSIMPLEMENTINTERV_GEN_ALL_ED
Implemented All Fall with Harm Risk Interventions:  [place] to call system. Call bell, personal items and telephone within reach. Instruct patient to call for assistance. Room bathroom lighting operational. Non-slip footwear when patient is off stretcher. Physically safe environment: no spills, clutter or unnecessary equipment. Stretcher in lowest position, wheels locked, appropriate side rails in place. Provide visual cue, wrist band, yellow gown, etc. Monitor gait and stability. Monitor for mental status changes and reorient to person, place, and time. Review medications for side effects contributing to fall risk. Reinforce activity limits and safety measures with patient and family. Provide visual clues: red socks.

## 2019-03-30 NOTE — H&P ADULT - ASSESSMENT
78 year old with PMH of MI in 1995, systolic CHF s/p AICD, COPD on home oxygen, (rheumatic heart disease), bioprosthetic aortic valve, melanoma and squamous cell carcinoma s/p resections presents to the ED after being shocked twice by AICD , AICD interrogation showed 2 episodes of monomorphic V-tach for which patient was appropriately shocked.    # Recurrent Vtech s/p AICD firing   - S/p AMiodarone bolus and currently on iv Amiodarone will repeat the iv bolus   - likely related to scar from MI in 1995 (inferior territory) combined with possible hypoxia  - Cardiology consult follow up with the cardiac   - continue metoprolol/ mexilitine, AMiodarone     # HFrEF  - Continue with home medications  - Diuretics  - Strict I's and O's    # COPD on home O2 - stable   -Patient takes spiriva and Incruse and beta blocker  - I would like to hold his nebs as patient is not SOB  - Can start albuterol PRN       # History of rheumatic heart disease s/p mechanical Mitral valve and bioprosthetic aortic valve.  - On coumadin to target INR of 2.5-3.5   - Repeat INR and dose coumadin accordingly        # Dyslipidemia  -Continue with Statin    # GI prophylaxis  -not indicated      # DVT prophylaxis  -On coumadin. 78 year old with PMH of MI in 1995, systolic CHF s/p AICD, COPD on home oxygen, (rheumatic heart disease), bioprosthetic aortic valve, melanoma and squamous cell carcinoma s/p resections presents to the ED after being shocked twice by AICD , AICD interrogation showed 2 episodes of monomorphic V-tach for which patient was appropriately shocked.    # Recurrent Vtech s/p AICD firing   - S/p AMiodarone bolus and currently on iv Amiodarone will repeat the iv bolus   - likely related to scar from MI in 1995 (inferior territory) combined with possible hypoxia  - Cardiology consult follow up with the cardiac   - continue metoprolol/ mexilitine, AMiodarone   - hold dicyclomine and incruse elipta, tiotropium     # HFrEF  - Continue with home medications  - Diuretics  - Strict I's and O's    # COPD on home O2 - stable   -Patient takes spiriva and Incruse and beta blocker  - I would like to hold his nebs as patient is not SOB  - Can start albuterol PRN     # YULIANA over CKD  - Follow up BMP   - Follow renal lytes  # History of rheumatic heart disease s/p mechanical Mitral valve and bioprosthetic aortic valve.  - On coumadin to target INR of 2.5-3.5   - Repeat INR and dose coumadin accordingly        # Dyslipidemia  -Continue with Statin    # GI prophylaxis  -not indicated      # DVT prophylaxis  -On coumadin. 78 year old with PMH of MI in 1995, systolic CHF s/p AICD, COPD on home oxygen, (rheumatic heart disease), bioprosthetic aortic valve, melanoma and squamous cell carcinoma s/p resections presents to the ED after being shocked twice by AICD , AICD interrogation showed 2 episodes of monomorphic V-tach for which patient was appropriately shocked.    # Recurrent Vtech s/p AICD firing   - S/p AMiodarone bolus and currently on iv Amiodarone will repeat the iv bolus   - likely related to scar from MI in 1995 (inferior territory) combined with possible hypoxia  - Cardiology consult follow up with the cardiac   - continue metoprolol/ mexilitine, AMiodarone   - hold dicyclomine and incruse elipta, tiotropium   - Keep Mg >2 and K>4    # HFrEF  - Stable   - Continue with home medications  - Torasemide BID and Spironolactone 12.5   - Strict I's and O's    # COPD on home O2 - stable   -Patient takes spiriva and Incruse and beta blocker  - I would like to hold his nebs as patient is not SOB  - Can start albuterol PRN     # YULIANA over CKD  - Follow up BMP   - Follow renal lytes    # History of rheumatic heart disease s/p mechanical Mitral valve and bioprosthetic aortic valve.  - On coumadin to target INR of 2.5-3.5   - Repeat INR and dose coumadin accordingly        # Dyslipidemia  -Continue with Statin    # GI prophylaxis  -not indicated      # DVT prophylaxis  -On coumadin. 78 year old with PMH of MI in 1995, systolic CHF s/p AICD, COPD on home oxygen, (rheumatic heart disease), bioprosthetic aortic valve, melanoma and squamous cell carcinoma s/p resections presents to the ED after being shocked twice by AICD , AICD interrogation showed 2 episodes of monomorphic V-tach for which patient was appropriately shocked.    # Recurrent Vtech s/p AICD firing   - S/p AMiodarone bolus and currently on iv Amiodarone will repeat the iv bolus   - EP evaluation by Abimbola Nagy   - continue metoprolol/ mexilitine, AMiodarone   - hold dicyclomine and incruse elipta, tiotropium   - Keep Mg >2 and K>4    # HFrEF/ CAD   - Stable   - Continue with home medications  - Torasemide BID and Spironolactone 12.5   - Strict I's and O's    # COPD on home O2 - stable   -Patient takes spiriva and Incruse together and b blocker  - Hold b blocker, dicyclomine and Spiriva   - Albuterol PRN     # YULIANA over CKD  - Follow up BMP   - Follow renal lytes    # History of rheumatic heart disease s/p mechanical Mitral valve and bioprosthetic aortic valve.  - On coumadin to target INR of 2.5-3.5   - Repeat INR and dose coumadin accordingly        # Dyslipidemia  -Continue with Statin    # GI prophylaxis  -not indicated      # DVT prophylaxis  -On coumadin. 78 year old with PMH of MI in 1995, systolic CHF s/p AICD, COPD on home oxygen, (rheumatic heart disease), bioprosthetic aortic valve, melanoma and squamous cell carcinoma s/p resections presents to the ED after being shocked twice by AICD , AICD interrogation showed 2 episodes of monomorphic V-tach for which patient was appropriately shocked.    # Recurrent Vtach s/p AICD firing   - S/p AMiodarone bolus and currently on iv Amiodarone will repeat the iv bolus   - EP evaluation by Abimbola Nagy   - continue metoprolol/ mexilitine, AMiodarone   - hold dicyclomine and incruse elipta, tiotropium   - Keep Mg >2 and K>4    # HFrEF/ CAD   - Stable   - Continue with home medications  - Torasemide BID and Spironolactone 12.5   - Strict I's and O's    # COPD on home O2 - stable   -Patient takes spiriva and Incruse together and b blocker  - Hold b blocker, dicyclomine and Spiriva   - Albuterol PRN     # YULIANA over CKD  - Follow up BMP   - Follow renal lytes    # History of rheumatic heart disease s/p mechanical Mitral valve and bioprosthetic aortic valve.  - On coumadin to target INR of 2.5-3.5   - Repeat INR and dose coumadin accordingly        # Dyslipidemia  -Continue with Statin    # GI prophylaxis  -not indicated      # DVT prophylaxis  -On coumadin.

## 2019-03-30 NOTE — ED PROVIDER NOTE - OBJECTIVE STATEMENT
77yo M hx HTN DL MI CHF AICD AVR AFib on Coumadin COPD pw AICD firing- recently admitted/dc-d for similar reason, device fired appropriately, pt in VT, placed on amio gtt then PO and Mexiletine, dc-d. Today, 20min PTA device fired again- No preceding sx, c/o L chest soreness but otherwise no complaints. No recent URI sx, no f/c/n/v/d, chest pain, shortness of breath, abdominal pain, numbness/focal weakness, back pain, dysuria/hematuria

## 2019-03-30 NOTE — ED PROVIDER NOTE - PROGRESS NOTE DETAILS
roberto carlos cardiology Dr. Nick alvarado dw cardiology- recs Amio load, gtt, and will interrogate device Case d/w cardio Dr. Huffman - states he will evaluate pt in person to determine dispositoin. Pt feeling well.  Repleting Mg.  Dr. Huffman at bedside interoggating device now. sp interrogation- found to have an episode of slow VT prior to firing today- approved for CCU by Dr. Forte

## 2019-03-30 NOTE — ED PROVIDER NOTE - ATTENDING CONTRIBUTION TO CARE
Pt is a 79yo male with AICD who was discharged a few days ago for PNA - was advised to have AICD changed but he refused.  Now today it fired.  No preceiding CP/SOB/syncope.  Some mild chest discomfort after firing.  No other complaints.    Exam: RRR, CTAB, soft NT abdomen, 2+ radial pulses, no LE edema, no calf tenderness  Imp: AICD discharge  Plan: labs, device interrogation, admission

## 2019-03-30 NOTE — H&P ADULT - NSHPPHYSICALEXAM_GEN_ALL_CORE
T(C): 36.7 (03-30-19 @ 19:30), Max: 36.8 (03-30-19 @ 17:38)  HR: 79 (03-30-19 @ 19:30) (79 - 80)  BP: 98/53 (03-30-19 @ 19:30) (98/53 - 108/79)  RR: 19 (03-30-19 @ 19:30) (18 - 19)  SpO2: 96% (03-30-19 @ 19:30) (96% - 100%)    PHYSICAL EXAM:  GENERAL: NAD, well-developed  HEAD:  Atraumatic, Normocephalic  EYES: EOMI, PERRLA, conjunctiva and sclera clear  NECK: Supple, No JVD  CHEST/LUNG: Clear to auscultation bilaterally; No wheeze  HEART: Regular rate and rhythm; No murmurs, rubs, or gallops  ABDOMEN: Soft, Nontender, Nondistended; Bowel sounds present  EXTREMITIES:  2+ Peripheral Pulses, No clubbing, cyanosis, or edema  PSYCH: AAOx3  NEUROLOGY: non-focal  SKIN: No rashes or lesions

## 2019-03-31 LAB
ALBUMIN SERPL ELPH-MCNC: 3.3 G/DL — LOW (ref 3.5–5.2)
ALP SERPL-CCNC: 84 U/L — SIGNIFICANT CHANGE UP (ref 30–115)
ALT FLD-CCNC: 39 U/L — SIGNIFICANT CHANGE UP (ref 0–41)
ANION GAP SERPL CALC-SCNC: 12 MMOL/L — SIGNIFICANT CHANGE UP (ref 7–14)
AST SERPL-CCNC: 26 U/L — SIGNIFICANT CHANGE UP (ref 0–41)
BASOPHILS # BLD AUTO: 0.02 K/UL — SIGNIFICANT CHANGE UP (ref 0–0.2)
BASOPHILS NFR BLD AUTO: 0.3 % — SIGNIFICANT CHANGE UP (ref 0–1)
BILIRUB SERPL-MCNC: 0.5 MG/DL — SIGNIFICANT CHANGE UP (ref 0.2–1.2)
BUN SERPL-MCNC: 23 MG/DL — HIGH (ref 10–20)
CALCIUM SERPL-MCNC: 8.8 MG/DL — SIGNIFICANT CHANGE UP (ref 8.5–10.1)
CHLORIDE SERPL-SCNC: 98 MMOL/L — SIGNIFICANT CHANGE UP (ref 98–110)
CO2 SERPL-SCNC: 27 MMOL/L — SIGNIFICANT CHANGE UP (ref 17–32)
CREAT SERPL-MCNC: 1.4 MG/DL — SIGNIFICANT CHANGE UP (ref 0.7–1.5)
EOSINOPHIL # BLD AUTO: 0.07 K/UL — SIGNIFICANT CHANGE UP (ref 0–0.7)
EOSINOPHIL NFR BLD AUTO: 0.9 % — SIGNIFICANT CHANGE UP (ref 0–8)
GLUCOSE BLDC GLUCOMTR-MCNC: 156 MG/DL — HIGH (ref 70–99)
GLUCOSE SERPL-MCNC: 120 MG/DL — HIGH (ref 70–99)
HCT VFR BLD CALC: 40.4 % — LOW (ref 42–52)
HGB BLD-MCNC: 13.3 G/DL — LOW (ref 14–18)
IMM GRANULOCYTES NFR BLD AUTO: 0.8 % — HIGH (ref 0.1–0.3)
INR BLD: 2.89 RATIO — HIGH (ref 0.65–1.3)
LYMPHOCYTES # BLD AUTO: 0.78 K/UL — LOW (ref 1.2–3.4)
LYMPHOCYTES # BLD AUTO: 10.4 % — LOW (ref 20.5–51.1)
MAGNESIUM SERPL-MCNC: 2.1 MG/DL — SIGNIFICANT CHANGE UP (ref 1.8–2.4)
MCHC RBC-ENTMCNC: 28.4 PG — SIGNIFICANT CHANGE UP (ref 27–31)
MCHC RBC-ENTMCNC: 32.9 G/DL — SIGNIFICANT CHANGE UP (ref 32–37)
MCV RBC AUTO: 86.1 FL — SIGNIFICANT CHANGE UP (ref 80–94)
MONOCYTES # BLD AUTO: 0.64 K/UL — HIGH (ref 0.1–0.6)
MONOCYTES NFR BLD AUTO: 8.5 % — SIGNIFICANT CHANGE UP (ref 1.7–9.3)
NEUTROPHILS # BLD AUTO: 5.95 K/UL — SIGNIFICANT CHANGE UP (ref 1.4–6.5)
NEUTROPHILS NFR BLD AUTO: 79.1 % — HIGH (ref 42.2–75.2)
NRBC # BLD: 0 /100 WBCS — SIGNIFICANT CHANGE UP (ref 0–0)
OSMOLALITY UR: 478 MOS/KG — SIGNIFICANT CHANGE UP (ref 50–1400)
PLATELET # BLD AUTO: 155 K/UL — SIGNIFICANT CHANGE UP (ref 130–400)
POTASSIUM SERPL-MCNC: 3.8 MMOL/L — SIGNIFICANT CHANGE UP (ref 3.5–5)
POTASSIUM SERPL-SCNC: 3.8 MMOL/L — SIGNIFICANT CHANGE UP (ref 3.5–5)
PROT SERPL-MCNC: 6.1 G/DL — SIGNIFICANT CHANGE UP (ref 6–8)
PROTHROM AB SERPL-ACNC: 32.9 SEC — HIGH (ref 9.95–12.87)
RBC # BLD: 4.69 M/UL — LOW (ref 4.7–6.1)
RBC # FLD: 13.7 % — SIGNIFICANT CHANGE UP (ref 11.5–14.5)
SODIUM SERPL-SCNC: 137 MMOL/L — SIGNIFICANT CHANGE UP (ref 135–146)
SODIUM UR-SCNC: 34 MMOL/L — SIGNIFICANT CHANGE UP
WBC # BLD: 7.52 K/UL — SIGNIFICANT CHANGE UP (ref 4.8–10.8)
WBC # FLD AUTO: 7.52 K/UL — SIGNIFICANT CHANGE UP (ref 4.8–10.8)

## 2019-03-31 RX ORDER — WARFARIN SODIUM 2.5 MG/1
2.5 TABLET ORAL ONCE
Qty: 0 | Refills: 0 | Status: COMPLETED | OUTPATIENT
Start: 2019-03-31 | End: 2019-03-31

## 2019-03-31 RX ORDER — AMIODARONE HYDROCHLORIDE 400 MG/1
150 TABLET ORAL ONCE
Qty: 0 | Refills: 0 | Status: COMPLETED | OUTPATIENT
Start: 2019-03-31 | End: 2019-03-31

## 2019-03-31 RX ORDER — BACITRACIN ZINC 500 UNIT/G
1 OINTMENT IN PACKET (EA) TOPICAL
Qty: 0 | Refills: 0 | Status: DISCONTINUED | OUTPATIENT
Start: 2019-03-31 | End: 2019-04-04

## 2019-03-31 RX ADMIN — MEXILETINE HYDROCHLORIDE 200 MILLIGRAM(S): 150 CAPSULE ORAL at 18:27

## 2019-03-31 RX ADMIN — AMIODARONE HYDROCHLORIDE 16.67 MG/MIN: 400 TABLET ORAL at 00:00

## 2019-03-31 RX ADMIN — Medication 1 APPLICATION(S): at 07:36

## 2019-03-31 RX ADMIN — WARFARIN SODIUM 2.5 MILLIGRAM(S): 2.5 TABLET ORAL at 21:24

## 2019-03-31 RX ADMIN — MEXILETINE HYDROCHLORIDE 200 MILLIGRAM(S): 150 CAPSULE ORAL at 10:24

## 2019-03-31 RX ADMIN — Medication 25 MILLIGRAM(S): at 06:59

## 2019-03-31 RX ADMIN — Medication 1 APPLICATION(S): at 18:27

## 2019-03-31 RX ADMIN — ATORVASTATIN CALCIUM 40 MILLIGRAM(S): 80 TABLET, FILM COATED ORAL at 21:24

## 2019-03-31 RX ADMIN — Medication 20 MILLIGRAM(S): at 19:31

## 2019-03-31 NOTE — CONSULT NOTE ADULT - ASSESSMENT
Sustained Monomorphic VT s/p ATP x 4, 20 J x1  Severe Ischemic Cardiomyopathy s/p AICD  Old IWMI 1995   CAD s/p CABG SVG to RCA 1995  S/P TAVR at [facility] for AS  Mechanical MVR    CCU monitoring  Correct electrolytes  start IV amiodarone with bolus and infusion  c/w BB and mexiletine  Pt needs to have BiV upgarde   and possible VT ablation  will d/w attedning

## 2019-03-31 NOTE — CONSULT NOTE ADULT - SUBJECTIVE AND OBJECTIVE BOX
Chief complaint:  AICD firing    HPI:  78 year old with PMH of MI in 1995, systolic CHF s/p AICD, COPD on PRN home oxygen, (rheumatic heart disease), TAVR, MVR, melanoma and squamous cell carcinoma s/p resections presents to the ED after an episode AICD firing while patient was using ipratropium nebulizer on the day of presentation. Pt denies any chest pain, palpitation. Patient was discharged 2 days ago from hospital after evaluation of two episode of AICD firing found to be Vtach episodes. Pt was sent with oral Amiodarone and Mexiletine and BiV ICD was recommended but pt wanted to postpone it to later at the time. Pt was also noted to be high risk for anesthesia and ablative procedure.     In the ED patient was seen by the cardiology fellow. AICD was interrogated. Pt had vtech. ATP and shock was delivered.   In the ED patients vitals were stable. (30 Mar 2019 19:11)      ROS:  Constitutional: No fever, chill, sweats  Eye: No recent visual problem  ENMT: No ear pain, nasal congestion, throat pain  Respiratoty: No SOB, cough  Cardiovascular: No chest pain, palpitaion, syncope  Gastrointestinal: No nausea, vomitting, diarhea  Genitourinary: No dysuria, hematuria  Heam/Lymp: No brusing tendency, no swollen glands  Endocrine: Negative for excessive hunger, thirst  Musculoskeletal: No neck pain, back pain, joint pain  Intergumentory: No rash, skin lesions  Neurologic: alert and oriented    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  Right inguinal hernia: 2006  H/O surgery to major organs, presenting hazards to health: Cholecystectomy 2017  H/O surgery to heart and great vessels, presenting hazards to health: AVR (2015 / MVR (1995)  H/O surgery to major organs, presenting hazards to health: ICD Implant 2012      FAMILY HISTORY:  FAMILY HISTORY:  Cancer (Sibling): Brother: Prostate cancer  Family history of heart disease (Sibling): Brother      SOCIAL HISTORY:  Denies smoking, alcohol    ALLERGIES:  No Known Allergies      MEDICATIONS:  MEDICATIONS  (STANDING):  amiodarone Infusion 1 mG/Min (33.333 mL/Hr) IV Continuous <Continuous>  amiodarone Infusion 0.5 mG/Min (16.667 mL/Hr) IV Continuous <Continuous>  atorvastatin 40 milliGRAM(s) Oral at bedtime  BACItracin   Ointment 1 Application(s) Topical two times a day  metoprolol succinate ER 25 milliGRAM(s) Oral daily  mexiletine 200 milliGRAM(s) Oral two times a day  spironolactone Oral Tab/Cap - Peds 12.5 milliGRAM(s) Oral daily  torsemide 20 milliGRAM(s) Oral two times a day    MEDICATIONS  (PRN):      HOME MEDICATIONS:  Home Medications:  dicyclomine 10 mg oral capsule: 1 cap(s) orally once a day (30 Mar 2019 21:43)  Incruse Ellipta 62.5 mcg/inh inhalation powder: 1 puff(s) inhaled once a day (30 Mar 2019 21:43)  spironolactone 25 mg oral tablet: 12.5 milligram(s) orally once a day (30 Mar 2019 21:43)  torsemide 20 mg oral tablet: 1 tab(s) orally 2 times a day (30 Mar 2019 21:43)  warfarin 2.5 mg oral tablet: 1 tab(s) orally once a day for three days and 1.25 mg on fourth day.  (30 Mar 2019 21:43)      VITALS:   T(F): 98.4 (03-30 @ 23:34), Max: 98.4 (03-30 @ 23:34)  HR: 80 (03-31 @ 06:49) (79 - 86)  BP: 95/57 (03-31 @ 06:49) (72/52 - 108/79)  BP(mean): --  RR: 18 (03-31 @ 05:17) (18 - 19)  SpO2: 95% (03-31 @ 05:17) (94% - 100%)    I&O's Summary      PHYSICAL EXAM:  GEN: Alert and oriented X 3, No acute distress  NECK: Supple, non tender, NO JVD, No carotid bruit,   LUNGS: Clear to auscultation bilaterally, non labored respiration  CARDIOVASCULAR: S1/S2 present, RRR , mild JOSE ANGEL  ABD: Soft, non-tender, non-distended,   EXT: No Lower extremity edema, no tenderness  NEURO: Non focal  SKIN: Intact    LABS:                        14.8   7.61  )-----------( 171      ( 30 Mar 2019 17:40 )             46.6     03-30    141  |  98  |  27<H>  ----------------------------<  112<H>  4.1   |  26  |  1.4    Ca    9.1      30 Mar 2019 17:40  Mg     1.7     03-30    TPro  7.0  /  Alb  4.0  /  TBili  0.4  /  DBili  x   /  AST  37  /  ALT  49<H>  /  AlkPhos  93  03-30    PT/INR - ( 30 Mar 2019 17:40 )   PT: 40.00 sec;   INR: 3.52 ratio         PTT - ( 30 Mar 2019 17:40 )  PTT:51.8 sec  Troponin T, Serum: <0.01 ng/mL (03-30-19 @ 17:40)    CARDIAC MARKERS ( 30 Mar 2019 17:40 )  x     / <0.01 ng/mL / x     / x     / x          ECG:  < from: 12 Lead ECG (03.30.19 @ 18:18) >  Diagnosis Line AV dual-paced rhythm with prolonged AV conduction  Abnormal ECG    < end of copied text >      < from: Transthoracic Echocardiogram (03.26.19 @ 09:17) >  Summary:   1. Left ventricular ejection fraction, by visual estimation, is <20%.   2. Severely decreased global left ventricular systolic function.   3. Mean Gradient across MV is 7.   4. Mild tricuspid regurgitation.   5. Bioprosthesis in the aortic position.   6. Mild to moderate aortic regurgitation.   7. Peak gradient acros AV is 20 with a mean of 12.   8. Pulmonic valve regurgitation.   9. Dilatation of the ascending aorta and aortic root.    < end of copied text >    TELEMETRY EVENTS:  Paced

## 2019-03-31 NOTE — CONSULT NOTE ADULT - ATTENDING COMMENTS
VT - continue BB  - stop amiodarone  - pt will require a nclear stress test.  - We have discussed on previous admission upgrade to BiV-ICD.       Patient is a excellent candidate for primary/secondary prevention ICD implant based on the MADIT II and SCD-HeFT trial. Patient  has ICM/NICM with EF <35% with LBBB and QRS duration >120 msec and is at risk for sudden cardiac death. Paitetn is pacing 100%. I have explained the procedure to the patient.  I have explained the risks and benefits of the procedure to the patient.  There is approximately 1% chance of any major cardiovascular complication to occur. Complications include, but are not limited to infection, bleeding, damage to the vessels, pneumothorax, stroke, death and heart attack.  The patient understands the risk and would like to proceed with the procedure. Patient indicated that all of his questions were answered to his satisfaction and verbalized understanding.

## 2019-04-01 LAB
ALBUMIN SERPL ELPH-MCNC: 3.4 G/DL — LOW (ref 3.5–5.2)
ALP SERPL-CCNC: 86 U/L — SIGNIFICANT CHANGE UP (ref 30–115)
ALT FLD-CCNC: 35 U/L — SIGNIFICANT CHANGE UP (ref 0–41)
ANION GAP SERPL CALC-SCNC: 15 MMOL/L — HIGH (ref 7–14)
AST SERPL-CCNC: 24 U/L — SIGNIFICANT CHANGE UP (ref 0–41)
BILIRUB DIRECT SERPL-MCNC: <0.2 MG/DL — SIGNIFICANT CHANGE UP (ref 0–0.2)
BILIRUB INDIRECT FLD-MCNC: >0.2 MG/DL — SIGNIFICANT CHANGE UP (ref 0.2–1.2)
BILIRUB SERPL-MCNC: 0.4 MG/DL — SIGNIFICANT CHANGE UP (ref 0.2–1.2)
BUN SERPL-MCNC: 23 MG/DL — HIGH (ref 10–20)
CALCIUM SERPL-MCNC: 8.6 MG/DL — SIGNIFICANT CHANGE UP (ref 8.5–10.1)
CHLORIDE SERPL-SCNC: 102 MMOL/L — SIGNIFICANT CHANGE UP (ref 98–110)
CO2 SERPL-SCNC: 23 MMOL/L — SIGNIFICANT CHANGE UP (ref 17–32)
CREAT SERPL-MCNC: 1.3 MG/DL — SIGNIFICANT CHANGE UP (ref 0.7–1.5)
GLUCOSE SERPL-MCNC: 110 MG/DL — HIGH (ref 70–99)
HCT VFR BLD CALC: 41.8 % — LOW (ref 42–52)
HGB BLD-MCNC: 13.5 G/DL — LOW (ref 14–18)
INR BLD: 2.16 RATIO — HIGH (ref 0.65–1.3)
MAGNESIUM SERPL-MCNC: 1.9 MG/DL — SIGNIFICANT CHANGE UP (ref 1.8–2.4)
MCHC RBC-ENTMCNC: 28.3 PG — SIGNIFICANT CHANGE UP (ref 27–31)
MCHC RBC-ENTMCNC: 32.3 G/DL — SIGNIFICANT CHANGE UP (ref 32–37)
MCV RBC AUTO: 87.6 FL — SIGNIFICANT CHANGE UP (ref 80–94)
NRBC # BLD: 0 /100 WBCS — SIGNIFICANT CHANGE UP (ref 0–0)
PLATELET # BLD AUTO: 160 K/UL — SIGNIFICANT CHANGE UP (ref 130–400)
POTASSIUM SERPL-MCNC: 3.7 MMOL/L — SIGNIFICANT CHANGE UP (ref 3.5–5)
POTASSIUM SERPL-SCNC: 3.7 MMOL/L — SIGNIFICANT CHANGE UP (ref 3.5–5)
PROT SERPL-MCNC: 6 G/DL — SIGNIFICANT CHANGE UP (ref 6–8)
PROTHROM AB SERPL-ACNC: 24.7 SEC — HIGH (ref 9.95–12.87)
RBC # BLD: 4.77 M/UL — SIGNIFICANT CHANGE UP (ref 4.7–6.1)
RBC # FLD: 13.7 % — SIGNIFICANT CHANGE UP (ref 11.5–14.5)
SODIUM SERPL-SCNC: 140 MMOL/L — SIGNIFICANT CHANGE UP (ref 135–146)
WBC # BLD: 7.73 K/UL — SIGNIFICANT CHANGE UP (ref 4.8–10.8)
WBC # FLD AUTO: 7.73 K/UL — SIGNIFICANT CHANGE UP (ref 4.8–10.8)

## 2019-04-01 PROCEDURE — 71045 X-RAY EXAM CHEST 1 VIEW: CPT | Mod: 26

## 2019-04-01 PROCEDURE — 93016 CV STRESS TEST SUPVJ ONLY: CPT

## 2019-04-01 PROCEDURE — 93018 CV STRESS TEST I&R ONLY: CPT

## 2019-04-01 PROCEDURE — 93010 ELECTROCARDIOGRAM REPORT: CPT | Mod: 59

## 2019-04-01 PROCEDURE — 78452 HT MUSCLE IMAGE SPECT MULT: CPT | Mod: 26

## 2019-04-01 RX ORDER — WARFARIN SODIUM 2.5 MG/1
3.5 TABLET ORAL ONCE
Qty: 0 | Refills: 0 | Status: DISCONTINUED | OUTPATIENT
Start: 2019-04-01 | End: 2019-04-01

## 2019-04-01 RX ORDER — HEPARIN SODIUM 5000 [USP'U]/ML
1200 INJECTION INTRAVENOUS; SUBCUTANEOUS
Qty: 25000 | Refills: 0 | Status: DISCONTINUED | OUTPATIENT
Start: 2019-04-01 | End: 2019-04-02

## 2019-04-01 RX ORDER — ADENOSINE 3 MG/ML
60 INJECTION INTRAVENOUS ONCE
Qty: 0 | Refills: 0 | Status: COMPLETED | OUTPATIENT
Start: 2019-04-01 | End: 2019-04-01

## 2019-04-01 RX ORDER — AMIODARONE HYDROCHLORIDE 400 MG/1
400 TABLET ORAL DAILY
Qty: 0 | Refills: 0 | Status: DISCONTINUED | OUTPATIENT
Start: 2019-04-01 | End: 2019-04-04

## 2019-04-01 RX ORDER — CHLORHEXIDINE GLUCONATE 213 G/1000ML
1 SOLUTION TOPICAL
Qty: 0 | Refills: 0 | Status: DISCONTINUED | OUTPATIENT
Start: 2019-04-01 | End: 2019-04-04

## 2019-04-01 RX ORDER — WARFARIN SODIUM 2.5 MG/1
3.5 TABLET ORAL ONCE
Qty: 0 | Refills: 0 | Status: COMPLETED | OUTPATIENT
Start: 2019-04-01 | End: 2019-04-01

## 2019-04-01 RX ADMIN — Medication 1 APPLICATION(S): at 17:49

## 2019-04-01 RX ADMIN — WARFARIN SODIUM 3.5 MILLIGRAM(S): 2.5 TABLET ORAL at 21:26

## 2019-04-01 RX ADMIN — SPIRONOLACTONE 12.5 MILLIGRAM(S): 25 TABLET, FILM COATED ORAL at 05:56

## 2019-04-01 RX ADMIN — Medication 1 APPLICATION(S): at 05:56

## 2019-04-01 RX ADMIN — Medication 20 MILLIGRAM(S): at 06:18

## 2019-04-01 RX ADMIN — ADENOSINE 600 MILLIGRAM(S): 3 INJECTION INTRAVENOUS at 17:05

## 2019-04-01 RX ADMIN — Medication 25 MILLIGRAM(S): at 05:56

## 2019-04-01 RX ADMIN — ATORVASTATIN CALCIUM 40 MILLIGRAM(S): 80 TABLET, FILM COATED ORAL at 21:24

## 2019-04-01 RX ADMIN — Medication 20 MILLIGRAM(S): at 17:49

## 2019-04-01 RX ADMIN — MEXILETINE HYDROCHLORIDE 200 MILLIGRAM(S): 150 CAPSULE ORAL at 17:50

## 2019-04-01 RX ADMIN — MEXILETINE HYDROCHLORIDE 200 MILLIGRAM(S): 150 CAPSULE ORAL at 05:56

## 2019-04-01 RX ADMIN — AMIODARONE HYDROCHLORIDE 400 MILLIGRAM(S): 400 TABLET ORAL at 11:48

## 2019-04-01 RX ADMIN — HEPARIN SODIUM 12 UNIT(S)/HR: 5000 INJECTION INTRAVENOUS; SUBCUTANEOUS at 11:48

## 2019-04-01 NOTE — PROGRESS NOTE ADULT - ASSESSMENT
·	Mono-morphic VTACH, recurrent; s/p AICD activation  ·	CAD, h/o MI in the past  ·	Heart Failure with Reduced Ejection Fraction s/p AICD  ·	COPD on home O2  ·	Thoracic aortic aneurysm   ·	HLD on therapy     P L A N   ·	Amiodarone therapy as per EP evaluation  ·	Diuretics as scheduled  ·	CCU monitoring  ·	R/O occlusive CAD as per Cardio/EP (planned NIT)  ·	If no signs of active ischemia on non-invasive testing, EP to proceed with upgrading the AICD  ·	Remains at high risk for recurrent cardiovascular events and complications despite all care. Remains in CCU for the time being.     Case discussed with resident assigned. ·	Mono-morphic VTACH, recurrent; s/p AICD activation  ·	CAD, h/o MI in the past  ·	Heart Failure with Reduced Ejection Fraction s/p AICD  ·	COPD on home O2  ·	Thoracic aortic aneurysm   ·	HLD on therapy     P L A N   ·	Amiodarone therapy as per EP evaluation  ·	Diuretics as scheduled  ·	CCU monitoring  ·	R/O occlusive CAD as per Cardio/EP (planned NIT)  ·	If no signs of active ischemia on non-invasive testing, EP to proceed with upgrading the AICD  ·	Remains at high risk for recurrent cardiovascular events and complications despite all care. Remains in CCU for the time being.     Case discussed with resident assigned.     Progress Note Handoff  Pending:  Cardio workup (NIT and upgrade of AICD by EP)  Patient/Family discussion: Patient OK with plan above and fully in agreement  Disposition: Likely home with home care.

## 2019-04-01 NOTE — PROGRESS NOTE ADULT - SUBJECTIVE AND OBJECTIVE BOX
YONATHAN FAGAN 78y Male  MRN#: [id number]     SUBJECTIVE  Patient is a 78y old Male who presents with a chief complaint of AICD s/p firing (01 Apr 2019 16:09)  Currently admitted to medicine with the primary diagnosis of Ventricular tachycardia.    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  Right inguinal hernia: 2006  H/O surgery to major organs, presenting hazards to health: Cholecystectomy 2017  H/O surgery to heart and great vessels, presenting hazards to health: AVR (2015 / MVR (1995)  H/O surgery to major organs, presenting hazards to health: ICD Implant 2012    ALLERGIES:  No Known Allergies    MEDICATIONS:  STANDING MEDICATIONS  adenosine Injectable (ADENOSCAN) 60 milliGRAM(s) IV Bolus once  amiodarone    Tablet 400 milliGRAM(s) Oral daily  atorvastatin 40 milliGRAM(s) Oral at bedtime  BACItracin   Ointment 1 Application(s) Topical two times a day  chlorhexidine 4% Liquid 1 Application(s) Topical <User Schedule>  heparin  Infusion 1200 Unit(s)/Hr IV Continuous <Continuous>  metoprolol succinate ER 25 milliGRAM(s) Oral daily  mexiletine 200 milliGRAM(s) Oral two times a day  spironolactone Oral Tab/Cap - Peds 12.5 milliGRAM(s) Oral daily  torsemide 20 milliGRAM(s) Oral two times a day  warfarin 3.5 milliGRAM(s) Oral once    PRN MEDICATIONS      VITAL SIGNS: Last 24 Hours  T(C): 36.2 (01 Apr 2019 08:00), Max: 36.8 (31 Mar 2019 20:00)  T(F): 97.1 (01 Apr 2019 08:00), Max: 98.2 (31 Mar 2019 20:00)  HR: 78 (01 Apr 2019 14:00) (78 - 83)  BP: 84/58 (01 Apr 2019 14:00) (83/53 - 135/78)  BP(mean): 63 (01 Apr 2019 14:00) (63 - 104)  RR: 22 (01 Apr 2019 14:00) (18 - 28)  SpO2: 97% (01 Apr 2019 14:00) (93% - 98%)    LABS:                        13.5   7.73  )-----------( 160      ( 01 Apr 2019 04:30 )             41.8     04-01    140  |  102  |  23<H>  ----------------------------<  110<H>  3.7   |  23  |  1.3    Ca    8.6      01 Apr 2019 04:30  Mg     1.9     04-01    TPro  6.0  /  Alb  3.4<L>  /  TBili  0.4  /  DBili  <0.2  /  AST  24  /  ALT  35  /  AlkPhos  86  04-01    PT/INR - ( 01 Apr 2019 04:30 )   PT: 24.70 sec;   INR: 2.16 ratio         PTT - ( 30 Mar 2019 17:40 )  PTT:51.8 sec    CARDIAC MARKERS ( 30 Mar 2019 17:40 )  x     / <0.01 ng/mL / x     / x     / x          PHYSICAL EXAM:  GENERAL: NAD, well-developed  	HEAD:  Atraumatic, Normocephalic  	EYES: EOMI, PERRLA, conjunctiva and sclera clear  	NECK: Supple, No JVD  	CHEST/LUNG: Clear to auscultation bilaterally; No wheeze  	HEART: Regular rate and rhythm; No murmurs, rubs, or gallops  	ABDOMEN: Soft, Nontender, Nondistended; Bowel sounds present  	EXTREMITIES:  2+ Peripheral Pulses, No clubbing, cyanosis, or edema  	PSYCH: AAOx3  	NEUROLOGY: non-focal  SKIN: No rashes or lesions    ASSESSMENT & PLAN    78 year old with PMH of MI in 1995, systolic CHF s/p AICD, COPD on home oxygen, (rheumatic heart disease), bioprosthetic aortic valve, melanoma and squamous cell carcinoma s/p resections presents to the ED after being shocked twice by AICD , AICD interrogation showed 2 episodes of monomorphic V-tach for which patient was appropriately shocked.    # Recurrent Ventricular Tachycardia s/p AICD firing   - S/p Amiodarone bolus and currently on amiodarone 400mg oral daily.  - EP evaluation done by Dr. Vallecillo,  Very good candidate for biventricular AICD,  as LBBB morphology and very high QRS on EKG,  -at very high risk for sudden cardiac death.  -Got a nuclear stress test today.  -Follow up with the results.  -Possible venogram today of upper extremity by EP.  -BiV placement tomorrow.  -NPO after midnight.	  -Continue heparin and Coumadin.  -No need to stop coumadin as patient very high risk for clots.  - continue metoprolol/ mexilitine, AMiodarone   - Keep Mg >2 and K>4    # HFrEF/ CAD   - Continue with home medications  - Torsemide 20mg BID and Spironolactone 12.5mg once daily.  - Strict I's and O's    # COPD on home O2 - stable   -Patient takes Spiriva and Incruse together and b blocker  - Hold b blocker, dicyclomine and Spiriva   - Albuterol PRN     # YULIANA over CKD  - Follow up BMP     # History of rheumatic heart disease s/p mechanical Mitral valve and bioprosthetic aortic valve.  - On coumadin to target INR of 2.5-3.5   - Repeat INR and dose coumadin accordingly   -So, will not stop coumadin despite him getting procedure tomorrow.     # Dyslipidemia  -Continue with Statin     # DVT prophylaxis  -On coumadin.    Dispo - Home

## 2019-04-01 NOTE — PROGRESS NOTE ADULT - ASSESSMENT
79yo Male with extensive cardiac history including CAD, MI, CABG, MVR, TAVR, CHF, VT, AICD  Admitted with recurrent VT and ICD shocks    Plan:  Con't PO Amio, no need for IV load, pt was loaded last week  Con't Metoprolol and Mexiletine  Please Maintain electrolytes K>4.0 Mg >2.0  Nuclear Persantine stress test today to r/o ischemic etiology of persistent VT  For BiV ICD upgrade tomorrow  possible venogram today  Con't Coumadin despite the procedure

## 2019-04-01 NOTE — PROGRESS NOTE ADULT - SUBJECTIVE AND OBJECTIVE BOX
YONATHAN FAGAN  78y, Male  Allergy: No Known Allergies    Hospital Day: 2d    Patient seen and examined earlier today.   Remains in CCU    LAST 24-Hr EVENTS:  NO complaints.  Previous Cardiac events on monitor noted     VITALS:  T(F): 97.1 (04-01-19 @ 08:00), Max: 98.2 (03-31-19 @ 20:00)  HR: 78 (04-01-19 @ 14:00)  BP: 84/58 (04-01-19 @ 14:00) (83/53 - 135/78)  RR: 22 (04-01-19 @ 14:00)  SpO2: 97% (04-01-19 @ 14:00)    TESTS & MEASUREMENTS:  Weight (Kg): 60 (03-31-19 @ 17:30)    BMI (kg/m2): 22 (03-31)    03-30-19 @ 07:01  -  03-31-19 @ 07:00  --------------------------------------------------------  IN: 16.7 mL / OUT: 0 mL / NET: 16.7 mL    03-31-19 @ 07:01  -  04-01-19 @ 07:00  --------------------------------------------------------  IN: 973.6 mL / OUT: 900 mL / NET: 73.6 mL    04-01-19 @ 07:01  -  04-01-19 @ 14:39  --------------------------------------------------------  IN: 150 mL / OUT: 0 mL / NET: 150 mL                        13.5   7.73  )-----------( 160      ( 01 Apr 2019 04:30 )             41.8     PT/INR - ( 01 Apr 2019 04:30 )   PT: 24.70 sec;   INR: 2.16 ratio         PTT - ( 30 Mar 2019 17:40 )  PTT:51.8 sec  04-01    140  |  102  |  23<H>  ----------------------------<  110<H>  3.7   |  23  |  1.3    BUN Trend:  23 mg/dL (04-01-19 @ 04:30)  23 mg/dL (03-31-19 @ 09:33)  27 mg/dL (03-30-19 @ 17:40)    Creatinine Trend:  1.3 (04-01 @ 04:30)    1.4 (03-31 @ 09:33)    1.4 (03-30 @ 17:40)        Ca    8.6      01 Apr 2019 04:30  Mg     1.9     04-01    TPro  6.0  /  Alb  3.4<L>  /  TBili  0.4  /  DBili  <0.2  /  AST  24  /  ALT  35  /  AlkPhos  86  04-01    LIVER FUNCTIONS - ( 01 Apr 2019 04:30 )  < from: 12 Lead ECG (04.01.19 @ 07:17) >  AV dual-paced rhythm with prolonged AV conduction  Alb: 3.4 g/dL / Pro: 6.0 g/dL / ALK PHOS: 86 U/L / ALT: 35 U/L / AST: 24 U/L / GGT: x           CARDIAC MARKERS ( 30 Mar 2019 17:40 )  x     / <0.01 ng/mL / x     / x     / x        RADIOLOGY & ADDITIONAL TESTS:  < from: Xray Chest 1 View- PORTABLE-Routine (04.01.19 @ 05:25) >    No radiographic evidence of acute cardiopulmonary disease.      MEDICATIONS:  MEDICATIONS  (STANDING):  adenosine Injectable (ADENOSCAN) 60 milliGRAM(s) IV Bolus once  amiodarone    Tablet 400 milliGRAM(s) Oral daily  atorvastatin 40 milliGRAM(s) Oral at bedtime  BACItracin   Ointment 1 Application(s) Topical two times a day  chlorhexidine 4% Liquid 1 Application(s) Topical <User Schedule>  heparin  Infusion 1200 Unit(s)/Hr (12 mL/Hr) IV Continuous <Continuous>  metoprolol succinate ER 25 milliGRAM(s) Oral daily  mexiletine 200 milliGRAM(s) Oral two times a day  spironolactone Oral Tab/Cap - Peds 12.5 milliGRAM(s) Oral daily  torsemide 20 milliGRAM(s) Oral two times a day  warfarin 3.5 milliGRAM(s) Oral once      HOME MEDICATIONS:  dicyclomine 10 mg oral capsule (03-30)  Incruse Ellipta 62.5 mcg/inh inhalation powder (03-30)  spironolactone 25 mg oral tablet (03-30)  torsemide 20 mg oral tablet (03-30)  warfarin 2.5 mg oral tablet (03-30)      PHYSICAL EXAM:  GENERAL: A&O x3,  in NAD/P, sitting in chair.  NECK: No Swelling  CHEST/LUNG: Good air entry, No wheezing  HEART: RRR, No murmurs  ABDOMEN: Soft, Bowel sounds present  EXTREMITIES:  No clubbing, YONATHAN FAGAN  78y, Male  Allergy: No Known Allergies    Hospital Day: 2d    Patient seen and examined earlier today.   Remains in CCU    LAST 24-Hr EVENTS:  NO complaints.  Previous Cardiac events on monitor noted     VITALS:  T(F): 97.1 (04-01-19 @ 08:00), Max: 98.2 (03-31-19 @ 20:00)  HR: 78 (04-01-19 @ 14:00)  BP: 84/58 (04-01-19 @ 14:00) (83/53 - 135/78)  RR: 22 (04-01-19 @ 14:00)  SpO2: 97% (04-01-19 @ 14:00)    TESTS & MEASUREMENTS:  Weight (Kg): 60 (03-31-19 @ 17:30)    BMI (kg/m2): 22 (03-31)    03-30-19 @ 07:01  -  03-31-19 @ 07:00  --------------------------------------------------------  IN: 16.7 mL / OUT: 0 mL / NET: 16.7 mL    03-31-19 @ 07:01  -  04-01-19 @ 07:00  --------------------------------------------------------  IN: 973.6 mL / OUT: 900 mL / NET: 73.6 mL    04-01-19 @ 07:01  -  04-01-19 @ 14:39  --------------------------------------------------------  IN: 150 mL / OUT: 0 mL / NET: 150 mL                        13.5   7.73  )-----------( 160      ( 01 Apr 2019 04:30 )             41.8     PT/INR - ( 01 Apr 2019 04:30 )   PT: 24.70 sec;   INR: 2.16 ratio         PTT - ( 30 Mar 2019 17:40 )  PTT:51.8 sec  04-01    140  |  102  |  23<H>  ----------------------------<  110<H>  3.7   |  23  |  1.3    BUN Trend:  23 mg/dL (04-01-19 @ 04:30)  23 mg/dL (03-31-19 @ 09:33)  27 mg/dL (03-30-19 @ 17:40)    Creatinine Trend:  1.3 (04-01 @ 04:30)    1.4 (03-31 @ 09:33)    1.4 (03-30 @ 17:40)      Ca    8.6      01 Apr 2019 04:30  Mg     1.9     04-01    TPro  6.0  /  Alb  3.4<L>  /  TBili  0.4  /  DBili  <0.2  /  AST  24  /  ALT  35  /  AlkPhos  86  04-01    LIVER FUNCTIONS - ( 01 Apr 2019 04:30 )  < from: 12 Lead ECG (04.01.19 @ 07:17) >  AV dual-paced rhythm with prolonged AV conduction  Alb: 3.4 g/dL / Pro: 6.0 g/dL / ALK PHOS: 86 U/L / ALT: 35 U/L / AST: 24 U/L / GGT: x           CARDIAC MARKERS ( 30 Mar 2019 17:40 )  x     / <0.01 ng/mL / x     / x     / x        RADIOLOGY & ADDITIONAL TESTS:  < from: Xray Chest 1 View- PORTABLE-Routine (04.01.19 @ 05:25) >    No radiographic evidence of acute cardiopulmonary disease.    MEDICATIONS:  MEDICATIONS  (STANDING):  adenosine Injectable (ADENOSCAN) 60 milliGRAM(s) IV Bolus once  amiodarone    Tablet 400 milliGRAM(s) Oral daily  atorvastatin 40 milliGRAM(s) Oral at bedtime  BACItracin   Ointment 1 Application(s) Topical two times a day  chlorhexidine 4% Liquid 1 Application(s) Topical <User Schedule>  heparin  Infusion 1200 Unit(s)/Hr (12 mL/Hr) IV Continuous <Continuous>  metoprolol succinate ER 25 milliGRAM(s) Oral daily  mexiletine 200 milliGRAM(s) Oral two times a day  spironolactone Oral Tab/Cap - Peds 12.5 milliGRAM(s) Oral daily  torsemide 20 milliGRAM(s) Oral two times a day  warfarin 3.5 milliGRAM(s) Oral once      HOME MEDICATIONS:  dicyclomine 10 mg oral capsule (03-30)  Incruse Ellipta 62.5 mcg/inh inhalation powder (03-30)  spironolactone 25 mg oral tablet (03-30)  torsemide 20 mg oral tablet (03-30)  warfarin 2.5 mg oral tablet (03-30)      PHYSICAL EXAM:  GENERAL: A&O x3,  in NAD/P, sitting in chair.  NECK: No Swelling  CHEST/LUNG: Good air entry, No wheezing  HEART: RRR, No murmurs  ABDOMEN: Soft, Bowel sounds present  EXTREMITIES:  No clubbing,

## 2019-04-01 NOTE — PROGRESS NOTE ADULT - SUBJECTIVE AND OBJECTIVE BOX
INTERVAL HPI/OVERNIGHT EVENTS:  asymptomastic  short run 5bts NSVT overnight      MEDICATIONS  (STANDING):  atorvastatin 40 milliGRAM(s) Oral at bedtime  BACItracin   Ointment 1 Application(s) Topical two times a day  chlorhexidine 4% Liquid 1 Application(s) Topical <User Schedule>  metoprolol succinate ER 25 milliGRAM(s) Oral daily  mexiletine 200 milliGRAM(s) Oral two times a day  spironolactone Oral Tab/Cap - Peds 12.5 milliGRAM(s) Oral daily  torsemide 20 milliGRAM(s) Oral two times a day  warfarin 3.5 milliGRAM(s) Oral once    MEDICATIONS  (PRN):      Allergies    No Known Allergies    Intolerances        REVIEW OF SYSTEMS    [x ] A ten-point review of systems was otherwise negative except as noted.      Vital Signs Last 24 Hrs  T(C): 36.2 (01 Apr 2019 08:00), Max: 37.2 (31 Mar 2019 12:00)  T(F): 97.1 (01 Apr 2019 08:00), Max: 98.9 (31 Mar 2019 12:00)  HR: 80 (01 Apr 2019 10:00) (66 - 83)  BP: 135/78 (01 Apr 2019 10:00) (83/53 - 135/78)  BP(mean): 65 (01 Apr 2019 08:00) (65 - 104)  RR: 18 (01 Apr 2019 10:00) (17 - 28)  SpO2: 95% (01 Apr 2019 10:00) (93% - 98%)    03-31-19 @ 07:01 - 04-01-19 @ 07:00  --------------------------------------------------------  IN: 973.6 mL / OUT: 900 mL / NET: 73.6 mL    04-01-19 @ 07:01 - 04-01-19 @ 10:12  --------------------------------------------------------  IN: 150 mL / OUT: 0 mL / NET: 150 mL        Physical Exam    General/Neuro:  alert & oriented x 3, no focal deficits, PERRLA, EOMI    Lungs:      clear to auscultation, Normal expansion/effort. no wheezes/rhonchi/rales    Cardiovascular : S1, S2.  Regular rate and rhythm. 2/6 SM at R+LSB,+ click    GI: Abdomen soft, Non-tender, Non-distended.  +BS    Extremities: Extremities warm, pink, well-perfused. no edema, Pulses: 1+    Derm: Good skin turgor, no skin breakdown.      LABS:                        13.5   7.73  )-----------( 160      ( 01 Apr 2019 04:30 )             41.8     04-01    140  |  102  |  23<H>  ----------------------------<  110<H>  3.7   |  23  |  1.3    Ca    8.6      01 Apr 2019 04:30  Mg     1.9     04-01    TPro  6.0  /  Alb  3.4<L>  /  TBili  0.4  /  DBili  <0.2  /  AST  24  /  ALT  35  /  AlkPhos  86  04-01    PT/INR - ( 01 Apr 2019 04:30 )   PT: 24.70 sec;   INR: 2.16 ratio         PTT - ( 30 Mar 2019 17:40 )  PTT:51.8 sec      RADIOLOGY & ADDITIONAL TESTS:

## 2019-04-02 LAB
ANION GAP SERPL CALC-SCNC: 23 MMOL/L — HIGH (ref 7–14)
APTT BLD: 141.6 SEC — CRITICAL HIGH (ref 27–39.2)
APTT BLD: 156.1 SEC — CRITICAL HIGH (ref 27–39.2)
APTT BLD: 182 SEC — CRITICAL HIGH (ref 27–39.2)
APTT BLD: 32.1 SEC — SIGNIFICANT CHANGE UP (ref 27–39.2)
BLD GP AB SCN SERPL QL: SIGNIFICANT CHANGE UP
BUN SERPL-MCNC: 29 MG/DL — HIGH (ref 10–20)
CALCIUM SERPL-MCNC: 9 MG/DL — SIGNIFICANT CHANGE UP (ref 8.5–10.1)
CHLORIDE SERPL-SCNC: 102 MMOL/L — SIGNIFICANT CHANGE UP (ref 98–110)
CO2 SERPL-SCNC: 19 MMOL/L — SIGNIFICANT CHANGE UP (ref 17–32)
CREAT SERPL-MCNC: 1.4 MG/DL — SIGNIFICANT CHANGE UP (ref 0.7–1.5)
GLUCOSE SERPL-MCNC: 110 MG/DL — HIGH (ref 70–99)
HCT VFR BLD CALC: 42.2 % — SIGNIFICANT CHANGE UP (ref 42–52)
HGB BLD-MCNC: 13.6 G/DL — LOW (ref 14–18)
INR BLD: 2.09 RATIO — HIGH (ref 0.65–1.3)
INR BLD: 2.15 RATIO — HIGH (ref 0.65–1.3)
MAGNESIUM SERPL-MCNC: 2 MG/DL — SIGNIFICANT CHANGE UP (ref 1.8–2.4)
MCHC RBC-ENTMCNC: 28 PG — SIGNIFICANT CHANGE UP (ref 27–31)
MCHC RBC-ENTMCNC: 32.2 G/DL — SIGNIFICANT CHANGE UP (ref 32–37)
MCV RBC AUTO: 86.8 FL — SIGNIFICANT CHANGE UP (ref 80–94)
NRBC # BLD: 0 /100 WBCS — SIGNIFICANT CHANGE UP (ref 0–0)
PLATELET # BLD AUTO: 173 K/UL — SIGNIFICANT CHANGE UP (ref 130–400)
POTASSIUM SERPL-MCNC: 3.8 MMOL/L — SIGNIFICANT CHANGE UP (ref 3.5–5)
POTASSIUM SERPL-SCNC: 3.8 MMOL/L — SIGNIFICANT CHANGE UP (ref 3.5–5)
PROTHROM AB SERPL-ACNC: 23.9 SEC — HIGH (ref 9.95–12.87)
PROTHROM AB SERPL-ACNC: 24.5 SEC — HIGH (ref 9.95–12.87)
RBC # BLD: 4.86 M/UL — SIGNIFICANT CHANGE UP (ref 4.7–6.1)
RBC # FLD: 13.6 % — SIGNIFICANT CHANGE UP (ref 11.5–14.5)
SODIUM SERPL-SCNC: 144 MMOL/L — SIGNIFICANT CHANGE UP (ref 135–146)
TYPE + AB SCN PNL BLD: SIGNIFICANT CHANGE UP
WBC # BLD: 7.34 K/UL — SIGNIFICANT CHANGE UP (ref 4.8–10.8)
WBC # FLD AUTO: 7.34 K/UL — SIGNIFICANT CHANGE UP (ref 4.8–10.8)

## 2019-04-02 PROCEDURE — 71045 X-RAY EXAM CHEST 1 VIEW: CPT | Mod: 26

## 2019-04-02 RX ORDER — WARFARIN SODIUM 2.5 MG/1
4 TABLET ORAL ONCE
Qty: 0 | Refills: 0 | Status: COMPLETED | OUTPATIENT
Start: 2019-04-02 | End: 2019-04-02

## 2019-04-02 RX ORDER — ONDANSETRON 8 MG/1
4 TABLET, FILM COATED ORAL ONCE
Qty: 0 | Refills: 0 | Status: COMPLETED | OUTPATIENT
Start: 2019-04-02 | End: 2019-04-02

## 2019-04-02 RX ORDER — ACETAMINOPHEN 500 MG
650 TABLET ORAL EVERY 6 HOURS
Qty: 0 | Refills: 0 | Status: DISCONTINUED | OUTPATIENT
Start: 2019-04-02 | End: 2019-04-04

## 2019-04-02 RX ORDER — HEPARIN SODIUM 5000 [USP'U]/ML
1000 INJECTION INTRAVENOUS; SUBCUTANEOUS
Qty: 25000 | Refills: 0 | Status: DISCONTINUED | OUTPATIENT
Start: 2019-04-02 | End: 2019-04-02

## 2019-04-02 RX ORDER — VANCOMYCIN HCL 1 G
1000 VIAL (EA) INTRAVENOUS ONCE
Qty: 0 | Refills: 0 | Status: COMPLETED | OUTPATIENT
Start: 2019-04-02 | End: 2019-04-02

## 2019-04-02 RX ORDER — DOPAMINE HYDROCHLORIDE 40 MG/ML
5 INJECTION, SOLUTION, CONCENTRATE INTRAVENOUS
Qty: 400 | Refills: 0 | Status: DISCONTINUED | OUTPATIENT
Start: 2019-04-02 | End: 2019-04-03

## 2019-04-02 RX ORDER — LANOLIN ALCOHOL/MO/W.PET/CERES
3 CREAM (GRAM) TOPICAL ONCE
Qty: 0 | Refills: 0 | Status: COMPLETED | OUTPATIENT
Start: 2019-04-02 | End: 2019-04-02

## 2019-04-02 RX ORDER — TRAMADOL HYDROCHLORIDE 50 MG/1
50 TABLET ORAL ONCE
Qty: 0 | Refills: 0 | Status: DISCONTINUED | OUTPATIENT
Start: 2019-04-02 | End: 2019-04-02

## 2019-04-02 RX ORDER — VANCOMYCIN HCL 1 G
1000 VIAL (EA) INTRAVENOUS EVERY 12 HOURS
Qty: 0 | Refills: 0 | Status: COMPLETED | OUTPATIENT
Start: 2019-04-02 | End: 2019-04-03

## 2019-04-02 RX ADMIN — TRAMADOL HYDROCHLORIDE 50 MILLIGRAM(S): 50 TABLET ORAL at 23:35

## 2019-04-02 RX ADMIN — ATORVASTATIN CALCIUM 40 MILLIGRAM(S): 80 TABLET, FILM COATED ORAL at 21:24

## 2019-04-02 RX ADMIN — DOPAMINE HYDROCHLORIDE 11.25 MICROGRAM(S)/KG/MIN: 40 INJECTION, SOLUTION, CONCENTRATE INTRAVENOUS at 19:18

## 2019-04-02 RX ADMIN — AMIODARONE HYDROCHLORIDE 400 MILLIGRAM(S): 400 TABLET ORAL at 05:55

## 2019-04-02 RX ADMIN — CHLORHEXIDINE GLUCONATE 1 APPLICATION(S): 213 SOLUTION TOPICAL at 05:55

## 2019-04-02 RX ADMIN — Medication 1 APPLICATION(S): at 05:56

## 2019-04-02 RX ADMIN — Medication 20 MILLIGRAM(S): at 05:57

## 2019-04-02 RX ADMIN — Medication 3 MILLIGRAM(S): at 23:35

## 2019-04-02 RX ADMIN — Medication 250 MILLIGRAM(S): at 13:30

## 2019-04-02 RX ADMIN — ONDANSETRON 4 MILLIGRAM(S): 8 TABLET, FILM COATED ORAL at 20:44

## 2019-04-02 RX ADMIN — WARFARIN SODIUM 4 MILLIGRAM(S): 2.5 TABLET ORAL at 21:24

## 2019-04-02 RX ADMIN — Medication 25 MILLIGRAM(S): at 05:56

## 2019-04-02 RX ADMIN — Medication 650 MILLIGRAM(S): at 21:24

## 2019-04-02 RX ADMIN — SPIRONOLACTONE 12.5 MILLIGRAM(S): 25 TABLET, FILM COATED ORAL at 05:56

## 2019-04-02 RX ADMIN — MEXILETINE HYDROCHLORIDE 200 MILLIGRAM(S): 150 CAPSULE ORAL at 05:56

## 2019-04-02 RX ADMIN — Medication 250 MILLIGRAM(S): at 13:28

## 2019-04-02 NOTE — PRE-ANESTHESIA EVALUATION ADULT - NSANTHPMHFT_GEN_ALL_CORE
OA, COPD on home O2, HTN, HLD, CAD c/b MI in 1995 s/p CABG, CHF (EF <20%) s/p AICD, recent episodes of VT s/p AICD shocks, OA, COPD on PRN home O2 (uses 3L/min when necessary), HLD, CAD c/b MI in 1995 s/p CABG/mMVR, CHF (EF <20%) s/p AICD, recent episodes of VT s/p AICD shocks, ex-smoker

## 2019-04-02 NOTE — PRE-ANESTHESIA EVALUATION ADULT - NSANTHPEFT_GEN_ALL_CORE
CV: normal S1/S2  Chest: CTA CV: normal S1/S2  Chest: CTA  Extremities: palpable radial pulses bilaterally

## 2019-04-02 NOTE — PROGRESS NOTE ADULT - SUBJECTIVE AND OBJECTIVE BOX
YONATHAN FAGAN 78y Male  MRN#: [id number]     SUBJECTIVE  Patient is a 78y old Male who presents with a chief complaint of AICD s/p firing (01 Apr 2019 16:09)  Currently admitted to medicine with the primary diagnosis of Ventricular tachycardia    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  Right inguinal hernia: 2006  H/O surgery to major organs, presenting hazards to health: Cholecystectomy 2017  H/O surgery to heart and great vessels, presenting hazards to health: AVR (2015 / MVR (1995)  H/O surgery to major organs, presenting hazards to health: ICD Implant 2012    ALLERGIES:  No Known Allergies    MEDICATIONS:  STANDING MEDICATIONS  amiodarone    Tablet 400 milliGRAM(s) Oral daily  atorvastatin 40 milliGRAM(s) Oral at bedtime  BACItracin   Ointment 1 Application(s) Topical two times a day  chlorhexidine 4% Liquid 1 Application(s) Topical <User Schedule>  heparin  Infusion 1000 Unit(s)/Hr IV Continuous <Continuous>  metoprolol succinate ER 25 milliGRAM(s) Oral daily  mexiletine 200 milliGRAM(s) Oral two times a day  spironolactone Oral Tab/Cap - Peds 12.5 milliGRAM(s) Oral daily  torsemide 20 milliGRAM(s) Oral two times a day    PRN MEDICATIONS      VITAL SIGNS: Last 24 Hours  T(C): 36.4 (02 Apr 2019 07:45), Max: 36.4 (01 Apr 2019 20:00)  T(F): 97.6 (02 Apr 2019 07:45), Max: 97.6 (01 Apr 2019 20:00)  HR: 78 (02 Apr 2019 10:41) (52 - 83)  BP: 98/59 (02 Apr 2019 10:41) (82/55 - 103/65)  BP(mean): 73 (02 Apr 2019 10:41) (63 - 81)  RR: 18 (02 Apr 2019 10:41) (18 - 22)  SpO2: 98% (02 Apr 2019 10:41) (96% - 98%)    LABS:                        13.6   7.34  )-----------( 173      ( 02 Apr 2019 04:58 )             42.2     04-02    144  |  102  |  29<H>  ----------------------------<  110<H>  3.8   |  19  |  1.4    Ca    9.0      02 Apr 2019 04:58  Mg     2.0     04-02    TPro  6.0  /  Alb  3.4<L>  /  TBili  0.4  /  DBili  <0.2  /  AST  24  /  ALT  35  /  AlkPhos  86  04-01    PT/INR - ( 02 Apr 2019 04:58 )   PT: 23.90 sec;   INR: 2.09 ratio         PTT - ( 02 Apr 2019 04:58 )  PTT:141.6 sec      PHYSICAL EXAM:    ASSESSMENT & PLAN YONATHAN FAGAN 78y Male  MRN#: [id number]     SUBJECTIVE  Patient is a 78y old Male who presents with a chief complaint of AICD s/p firing (01 Apr 2019 16:09)  Currently admitted to medicine with the primary diagnosis of Ventricular tachycardia    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  Right inguinal hernia: 2006  H/O surgery to major organs, presenting hazards to health: Cholecystectomy 2017  H/O surgery to heart and great vessels, presenting hazards to health: AVR (2015 / MVR (1995)  H/O surgery to major organs, presenting hazards to health: ICD Implant 2012    ALLERGIES:  No Known Allergies    MEDICATIONS:  STANDING MEDICATIONS  amiodarone    Tablet 400 milliGRAM(s) Oral daily  atorvastatin 40 milliGRAM(s) Oral at bedtime  BACItracin   Ointment 1 Application(s) Topical two times a day  chlorhexidine 4% Liquid 1 Application(s) Topical <User Schedule>  heparin  Infusion 1000 Unit(s)/Hr IV Continuous <Continuous>  metoprolol succinate ER 25 milliGRAM(s) Oral daily  mexiletine 200 milliGRAM(s) Oral two times a day  spironolactone Oral Tab/Cap - Peds 12.5 milliGRAM(s) Oral daily  torsemide 20 milliGRAM(s) Oral two times a day    PRN MEDICATIONS      VITAL SIGNS: Last 24 Hours  T(C): 36.4 (02 Apr 2019 07:45), Max: 36.4 (01 Apr 2019 20:00)  T(F): 97.6 (02 Apr 2019 07:45), Max: 97.6 (01 Apr 2019 20:00)  HR: 78 (02 Apr 2019 10:41) (52 - 83)  BP: 98/59 (02 Apr 2019 10:41) (82/55 - 103/65)  BP(mean): 73 (02 Apr 2019 10:41) (63 - 81)  RR: 18 (02 Apr 2019 10:41) (18 - 22)  SpO2: 98% (02 Apr 2019 10:41) (96% - 98%)    LABS:                        13.6   7.34  )-----------( 173      ( 02 Apr 2019 04:58 )             42.2     04-02    144  |  102  |  29<H>  ----------------------------<  110<H>  3.8   |  19  |  1.4    Ca    9.0      02 Apr 2019 04:58  Mg     2.0     04-02    TPro  6.0  /  Alb  3.4<L>  /  TBili  0.4  /  DBili  <0.2  /  AST  24  /  ALT  35  /  AlkPhos  86  04-01    PT/INR - ( 02 Apr 2019 04:58 )   PT: 23.90 sec;   INR: 2.09 ratio         PTT - ( 02 Apr 2019 04:58 )  PTT:141.6 sec      PHYSICAL EXAM:  GENERAL: NAD, well-developed  	HEAD:  Atraumatic, Normocephalic  	EYES: EOMI, PERRLA, conjunctiva and sclera clear  	NECK: Supple, No JVD  	CHEST/LUNG: Clear to auscultation bilaterally; No wheeze  	HEART: Regular rate and rhythm; No murmurs, rubs, or gallops  	ABDOMEN: Soft, Nontender, Nondistended; Bowel sounds present  	EXTREMITIES:  2+ Peripheral Pulses, No clubbing, cyanosis, or edema  	PSYCH: AAOx3  	NEUROLOGY: non-focal  SKIN: No rashes or lesions.    ASSESSMENT & PLAN    78 year old with PMH of MI in 1995, systolic CHF s/p AICD, COPD on home oxygen, (rheumatic heart disease), bioprosthetic aortic valve, melanoma and squamous cell carcinoma s/p resections presents to the ED after being shocked twice by AICD , AICD interrogation showed 2 episodes of monomorphic V-tach for which patient was appropriately shocked.    # Recurrent Ventricular Tachycardia s/p AICD firing   - S/p Amiodarone bolus and currently on amiodarone 400mg oral daily.  - EP evaluation done by Dr. Vallecillo,  Very good candidate for biventricular AICD,  as LBBB morphology and very high QRS on EKG,  -at very high risk for sudden cardiac death.  -Got a nuclear stress test yesterday, old scar on the inferior wall due to previous MI.  -No new ischemia.  -Got a DEVICE UPGRADE TODAY TO BIVENTRICULAR. LV Lead added.  -Device interrogation tomorrow in AM.  -Continue Coumadin tonight with the target INR goal of 2.5-3.5  -No heparin products or Lovenox.    # HFrEF/ CAD   - Continue with home medications  - Torsemide 20mg BID and Spironolactone 12.5mg once daily.  - Strict I's and O's    # COPD on home O2 - stable   -Patient takes Spiriva and Incruse together and b blocker  - Hold b blocker, dicyclomine and Spiriva   - Albuterol PRN     # History of rheumatic heart disease s/p mechanical Mitral valve and bioprosthetic aortic valve.  - On coumadin to target INR of 2.5-3.5   - Repeat INR and dose coumadin accordingly      # DVT prophylaxis  -On coumadin.    Dispo - Home

## 2019-04-02 NOTE — PROGRESS NOTE ADULT - SUBJECTIVE AND OBJECTIVE BOX
Electrophysiology Brief Post-Op Note    I have personally seen and examined the patient.  I agree with the history and physical which I have reviewed and noted any changes below.      PRE-OP DIAGNOSIS: ICM/LBBB    POST-OP DIAGNOSIS: ICM/LBBB    PROCEDURE: BiV ICD Upgrade    Physician: Antonia Guevara MD  Assistant: None    ESTIMATED BLOOD LOSS:     50  mL    ANESTHESIA TYPE:  [  ] General Anesthesia  [ x ] Sedation  [ x ] Local/Regional    CONDITION  [  ] Critical  [  ] Serious  [  ] Fair  [ x ] Good      SPECIMENS REMOVED (IF APPLICABLE): DC ICD generator    IMPLANTS (IF APPLICABLE): BiV ICD Upgrade (added LV lead)    PLAN OF CARE  - Vancomycin 1g IV q12 h  - Chest X-ray PA now and PA/Lat tomorrow am  - Device interrogation tomorrow in am  - NO HEPARIN PRODUCTS OR LOVENOX   - Continue coumadin for mechanical MV with goal INR closer to 2.5    Antonia Guevara MD   Electrophysiology Attending

## 2019-04-02 NOTE — CHART NOTE - NSCHARTNOTEFT_GEN_A_CORE
Cardiac Electrophysiology Procedure Report    Date of procedure	19  EP Attending	Antonia Guevara MD  Assistant               Stephen Vallecillo MD  Anesthesiologist	Jostin Gonzales MD  Referring Physician	Mario Levin MD  Procedure	Venogram, Venoplasty, and BiV ICD Upgrade       Indication: 79 yo M with history of CAD with MI s/p CABG & mechanical MVR on Coumadin (), HL, chronic systolic HF s/p DC ICD () for secondary prevention after EPS induced VT. Patient is also s/p TAVR () and was recently admitted for two ICD shocks for slow VT. Pt also has history of LBBB with QRS > 120 msec and Class II heart failure. He is now referred for BiV ICD upgrade.      A thorough discussion was had with the patient and his family concerning all aspects of ICD therapy. We reviewed the data supporting ICD therapy and how it applies individually.  We discussed the procedures, risks and outcomes of ICD implantation and living with an ICD. We discussed management of ICD therapy throughout life, including deactivation of the ICD. After all questions were answered, it was a shared decision to proceed with BiV ICD upgrade.    EQUIPMENT REMOVED  Pulse Generator   :   Medtronic	   Model Number:[id number]   Serial Number: [id number]  Implanted:         2018      EQUIPMENT IMPLANTED AND BASELINE PARAMETERS  Pulse Generator   :    Medtronic 	   Model Number: [id number]	   Serial Number:  [id number]	     Atrial Lead  :    Medtronic  Model Number:  [id number]	  Serial Number:   [id number]	  Location: 	Right atrial appendage  Sensin.1  mV  Impedance:          475 Ohms  Threshold: 	1.25 V at 0.4 ms  Implanted:           2012    Right Ventricular Lead  :       Medtronic  Model Number:     [id number]	  Serial Number:      [id number]	  Location	   RV apical septum  Sensin.5 mV  Impedance:     	   399 Ohms / HVB 47 Ohms/ HVX Imp 59 Ohms  Threshold: 	   1.75 V at 0.4 ms  Implanted:              2012    Left Ventricular Lead  :       Medtronic  Model Number:	 [id number]   Serial Number:	[id number]  Location: 	Lateral branch of the CS  Sensin.6 mV  Impedance:	741 Ohms  Threshold: 	 1.6 V at 0.5 ms    DESCRIPTION OF PROCEDURE  The patient was brought to the procedure room in a nonsedated and fasting state, having received preoperative antibiotics. Informed, written consent was obtained prior to the procedure. Conscious sedation was administered by the anesthesiologist. Blood pressure, oxygenation and level of comfort were stable throughout.  Using 12 cc of IV contrast, a left upper extremity venogram was performed showing patent cephalic vein, but partial occlusion near the proximal portion of the subclavian vein as well as by the SVC at the site of the proximal coil of the RV lead. The left shoulder region was cleaned and prepped with serial applications of Chlorhexidine solution. Patient was then covered from head to toe with sterile drapes in the usual manner.    The prior incision site was anesthetized with 20 cc of premixed 50/50 lidocaine/sensorcaine solution was administered. A 3.5 cm incision was made along the previous surgical scar and extended down to the previously implanted pacemaker pulse generator. The device was removed from the pockets. The caudal and medial aspects of the pocket were incised, and using blunt dissection along the anterior pectoralis fascia, the pocket was enlarged to accommodate the new pulse generator.     In the groove, the cephalic vein was identified, isolated, and controlled with proximal and distal 2.0 TiCron ties. Between the ties, a venotomy incision was made; through the venotomy, one guidewire was advanced through an 18G angiocath to the inferior vena cava under fluoroscopic guidance.      Over the first guidewire, a 6 Fr sheath was advanced into the cephalic vein. A glidewire was advanced down to the RA and was exchanged for a Cook wire. Venoplasty was performed with 10 zita of pressure using a balloon at the two sites of stenosis (proximal subclavian vein and at the junction of the SVC). A 9 Fr introducer sheath was exchanged for the 6 Fr short sheath. A USEREADY guiding sheath was then advanced over the wire into the RA. A Supra CS catheter was then inserted and the CS was cannulated with some difficulty. The CS was noted to be anterior and have a sharp takeoff at the os.  The sheath was advanced into the CS and the catheter was removed. Next, an DB and MARTELL balloon venogram was performed that revealed a CS os with a moderate size but only one suitable contributory lateral vein noted to have a tortuous takeoff at the os.     Using an inner catheter, angioplasty wire and lead as support, we were able to engage the lateral branch. A LV lead was then advanced the branch in a stable lateral position. Good separation from the RV lead was confirmed in both the MARTELL and DB projections. The lead was connected and adequate pacing and sensing threshold parameters were obtained. There was no evidence of diaphragmatic pacing at maximal output. Subsequently the inner catheter and out catheters were split with no movement of the LV lead. The introducer sheath was split and removed. The LV lead was secured to the pre-pectoral fascia with three 0 silk sutures tied around the sewing sleeve.    Next, the pocket was inspected for bleeding, and electrocautery applied where appropriate. The wound was irrigated with copious amounts of antibiotic solution. Rakan 4mg was used for hemostasis. The leads were wiped clean and connected to the pulse generator. The leads and pulse generator were coiled into the pocket.      The wound margins were approximated well, without any tension or overlap. The wound was closed using an interrupted layer of 2-0 absorbable Vicryl suture for the deep fascial layer. This was followed by a continuous layer of 3-0 absorbable suture. The skin layer was approximated with Dermabond. Steri-strips were applied over this and a dry, sterile dressing was placed over this. A pressure dressing was placed over this site. The patient was returned to a hospital room in stable condition. Needle count was performed and found to be consistent at the end of the procedure       COMPLICATIONS:  The patient tolerated the procedure well. There were no immediate complications.     CONCLUSIONS:  Successful venoplasty of left subclavian vein and BiV Implantable Cardioverter Defibrillator upgrade.    EBL:50 cc    FLUORO: 30 min 31 sec; 246 mGy      _______________________________  Antonia Guevara MD  Cardiac Electrophysiology

## 2019-04-03 LAB
ANION GAP SERPL CALC-SCNC: 14 MMOL/L — SIGNIFICANT CHANGE UP (ref 7–14)
APTT BLD: 41.4 SEC — HIGH (ref 27–39.2)
APTT BLD: 41.5 SEC — HIGH (ref 27–39.2)
BUN SERPL-MCNC: 27 MG/DL — HIGH (ref 10–20)
CALCIUM SERPL-MCNC: 9.1 MG/DL — SIGNIFICANT CHANGE UP (ref 8.5–10.1)
CHLORIDE SERPL-SCNC: 99 MMOL/L — SIGNIFICANT CHANGE UP (ref 98–110)
CO2 SERPL-SCNC: 24 MMOL/L — SIGNIFICANT CHANGE UP (ref 17–32)
CREAT SERPL-MCNC: 1.4 MG/DL — SIGNIFICANT CHANGE UP (ref 0.7–1.5)
GLUCOSE SERPL-MCNC: 129 MG/DL — HIGH (ref 70–99)
HCT VFR BLD CALC: 42.9 % — SIGNIFICANT CHANGE UP (ref 42–52)
HGB BLD-MCNC: 13.8 G/DL — LOW (ref 14–18)
INR BLD: 2.5 RATIO — HIGH (ref 0.65–1.3)
MCHC RBC-ENTMCNC: 28.1 PG — SIGNIFICANT CHANGE UP (ref 27–31)
MCHC RBC-ENTMCNC: 32.2 G/DL — SIGNIFICANT CHANGE UP (ref 32–37)
MCV RBC AUTO: 87.4 FL — SIGNIFICANT CHANGE UP (ref 80–94)
NRBC # BLD: 0 /100 WBCS — SIGNIFICANT CHANGE UP (ref 0–0)
PLATELET # BLD AUTO: 164 K/UL — SIGNIFICANT CHANGE UP (ref 130–400)
POTASSIUM SERPL-MCNC: 4.5 MMOL/L — SIGNIFICANT CHANGE UP (ref 3.5–5)
POTASSIUM SERPL-SCNC: 4.5 MMOL/L — SIGNIFICANT CHANGE UP (ref 3.5–5)
PROTHROM AB SERPL-ACNC: 28.5 SEC — HIGH (ref 9.95–12.87)
RBC # BLD: 4.91 M/UL — SIGNIFICANT CHANGE UP (ref 4.7–6.1)
RBC # FLD: 13.6 % — SIGNIFICANT CHANGE UP (ref 11.5–14.5)
SODIUM SERPL-SCNC: 137 MMOL/L — SIGNIFICANT CHANGE UP (ref 135–146)
WBC # BLD: 10.2 K/UL — SIGNIFICANT CHANGE UP (ref 4.8–10.8)
WBC # FLD AUTO: 10.2 K/UL — SIGNIFICANT CHANGE UP (ref 4.8–10.8)

## 2019-04-03 PROCEDURE — 71046 X-RAY EXAM CHEST 2 VIEWS: CPT | Mod: 26,77

## 2019-04-03 PROCEDURE — 93010 ELECTROCARDIOGRAM REPORT: CPT

## 2019-04-03 PROCEDURE — 93284 PRGRMG EVAL IMPLANTABLE DFB: CPT | Mod: 26

## 2019-04-03 PROCEDURE — 71046 X-RAY EXAM CHEST 2 VIEWS: CPT | Mod: 26

## 2019-04-03 RX ORDER — WARFARIN SODIUM 2.5 MG/1
4 TABLET ORAL ONCE
Qty: 0 | Refills: 0 | Status: COMPLETED | OUTPATIENT
Start: 2019-04-03 | End: 2019-04-03

## 2019-04-03 RX ORDER — SODIUM CHLORIDE 9 MG/ML
250 INJECTION INTRAMUSCULAR; INTRAVENOUS; SUBCUTANEOUS ONCE
Qty: 0 | Refills: 0 | Status: COMPLETED | OUTPATIENT
Start: 2019-04-03 | End: 2019-04-03

## 2019-04-03 RX ORDER — METOCLOPRAMIDE HCL 10 MG
10 TABLET ORAL ONCE
Qty: 0 | Refills: 0 | Status: COMPLETED | OUTPATIENT
Start: 2019-04-03 | End: 2019-04-03

## 2019-04-03 RX ORDER — MORPHINE SULFATE 50 MG/1
2 CAPSULE, EXTENDED RELEASE ORAL ONCE
Qty: 0 | Refills: 0 | Status: DISCONTINUED | OUTPATIENT
Start: 2019-04-03 | End: 2019-04-03

## 2019-04-03 RX ADMIN — Medication 1 APPLICATION(S): at 17:36

## 2019-04-03 RX ADMIN — MEXILETINE HYDROCHLORIDE 200 MILLIGRAM(S): 150 CAPSULE ORAL at 17:36

## 2019-04-03 RX ADMIN — Medication 20 MILLIGRAM(S): at 06:22

## 2019-04-03 RX ADMIN — Medication 250 MILLIGRAM(S): at 00:56

## 2019-04-03 RX ADMIN — ATORVASTATIN CALCIUM 40 MILLIGRAM(S): 80 TABLET, FILM COATED ORAL at 21:38

## 2019-04-03 RX ADMIN — AMIODARONE HYDROCHLORIDE 400 MILLIGRAM(S): 400 TABLET ORAL at 06:17

## 2019-04-03 RX ADMIN — Medication 1 APPLICATION(S): at 06:22

## 2019-04-03 RX ADMIN — Medication 10 MILLIGRAM(S): at 07:48

## 2019-04-03 RX ADMIN — MORPHINE SULFATE 2 MILLIGRAM(S): 50 CAPSULE, EXTENDED RELEASE ORAL at 01:17

## 2019-04-03 RX ADMIN — Medication 250 MILLIGRAM(S): at 13:00

## 2019-04-03 RX ADMIN — WARFARIN SODIUM 4 MILLIGRAM(S): 2.5 TABLET ORAL at 21:38

## 2019-04-03 RX ADMIN — Medication 650 MILLIGRAM(S): at 06:16

## 2019-04-03 RX ADMIN — SODIUM CHLORIDE 500 MILLILITER(S): 9 INJECTION INTRAMUSCULAR; INTRAVENOUS; SUBCUTANEOUS at 11:58

## 2019-04-03 RX ADMIN — MEXILETINE HYDROCHLORIDE 200 MILLIGRAM(S): 150 CAPSULE ORAL at 06:17

## 2019-04-03 RX ADMIN — SPIRONOLACTONE 12.5 MILLIGRAM(S): 25 TABLET, FILM COATED ORAL at 06:22

## 2019-04-03 NOTE — PROGRESS NOTE ADULT - SUBJECTIVE AND OBJECTIVE BOX
YONATHAN FAGAN 78y Male  MRN#: [id number]   SUBJECTIVE  Patient is a 78y old Male who presents with a chief complaint of AICD s/p firing (03 Apr 2019 09:18)  Currently admitted to medicine with the primary diagnosis of Ventricular tachycardia    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  Right inguinal hernia: 2006  H/O surgery to major organs, presenting hazards to health: Cholecystectomy 2017  H/O surgery to heart and great vessels, presenting hazards to health: AVR (2015 / MVR (1995)  H/O surgery to major organs, presenting hazards to health: ICD Implant 2012    ALLERGIES:  No Known Allergies    MEDICATIONS:  STANDING MEDICATIONS  amiodarone    Tablet 400 milliGRAM(s) Oral daily  atorvastatin 40 milliGRAM(s) Oral at bedtime  BACItracin   Ointment 1 Application(s) Topical two times a day  chlorhexidine 4% Liquid 1 Application(s) Topical <User Schedule>  DOPamine Infusion 5 MICROgram(s)/kG/Min IV Continuous <Continuous>  mexiletine 200 milliGRAM(s) Oral two times a day  vancomycin  IVPB 1000 milliGRAM(s) IV Intermittent every 12 hours  warfarin 4 milliGRAM(s) Oral once    PRN MEDICATIONS  acetaminophen   Tablet .. 650 milliGRAM(s) Oral every 6 hours PRN      VITAL SIGNS: Last 24 Hours  T(C): 36.6 (03 Apr 2019 12:04), Max: 37.8 (03 Apr 2019 04:05)  T(F): 97.8 (03 Apr 2019 12:04), Max: 100 (03 Apr 2019 04:05)  HR: 90 (03 Apr 2019 12:04) (78 - 92)  BP: 130/60 (03 Apr 2019 12:04) (84/50 - 130/60)  BP(mean): 80 (03 Apr 2019 12:04) (58 - 90)  RR: 17 (03 Apr 2019 12:04) (17 - 31)  SpO2: 99% (03 Apr 2019 12:04) (91% - 99%)    LABS:                        13.8   10.20 )-----------( 164      ( 03 Apr 2019 04:49 )             42.9     04-03    137  |  99  |  27<H>  ----------------------------<  129<H>  4.5   |  24  |  1.4    Ca    9.1      03 Apr 2019 04:49  Mg     2.0     04-02      PT/INR - ( 03 Apr 2019 04:49 )   PT: 28.50 sec;   INR: 2.50 ratio         PTT - ( 03 Apr 2019 04:49 )  PTT:41.5 sec      PHYSICAL EXAM:    ASSESSMENT & PLAN YONATHAN FAGAN 78y Male  MRN#: [id number]     SUBJECTIVE  Patient is a 78y old Male who presents with a chief complaint of AICD s/p firing (03 Apr 2019 09:18)  Currently admitted to medicine with the primary diagnosis of Ventricular tachycardia.    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  Right inguinal hernia: 2006  H/O surgery to major organs, presenting hazards to health: Cholecystectomy 2017  H/O surgery to heart and great vessels, presenting hazards to health: AVR (2015 / MVR (1995)  H/O surgery to major organs, presenting hazards to health: ICD Implant 2012    ALLERGIES:  No Known Allergies    MEDICATIONS:  STANDING MEDICATIONS  amiodarone    Tablet 400 milliGRAM(s) Oral daily  atorvastatin 40 milliGRAM(s) Oral at bedtime  BACItracin   Ointment 1 Application(s) Topical two times a day  chlorhexidine 4% Liquid 1 Application(s) Topical <User Schedule>  DOPamine Infusion 5 MICROgram(s)/kG/Min IV Continuous <Continuous>  mexiletine 200 milliGRAM(s) Oral two times a day  vancomycin  IVPB 1000 milliGRAM(s) IV Intermittent every 12 hours  warfarin 4 milliGRAM(s) Oral once    PRN MEDICATIONS  acetaminophen   Tablet .. 650 milliGRAM(s) Oral every 6 hours PRN      VITAL SIGNS: Last 24 Hours  T(C): 36.6 (03 Apr 2019 12:04), Max: 37.8 (03 Apr 2019 04:05)  T(F): 97.8 (03 Apr 2019 12:04), Max: 100 (03 Apr 2019 04:05)  HR: 90 (03 Apr 2019 12:04) (78 - 92)  BP: 130/60 (03 Apr 2019 12:04) (84/50 - 130/60)  BP(mean): 80 (03 Apr 2019 12:04) (58 - 90)  RR: 17 (03 Apr 2019 12:04) (17 - 31)  SpO2: 99% (03 Apr 2019 12:04) (91% - 99%)    LABS:                        13.8   10.20 )-----------( 164      ( 03 Apr 2019 04:49 )             42.9     04-03    137  |  99  |  27<H>  ----------------------------<  129<H>  4.5   |  24  |  1.4    Ca    9.1      03 Apr 2019 04:49  Mg     2.0     04-02      PT/INR - ( 03 Apr 2019 04:49 )   PT: 28.50 sec;   INR: 2.50 ratio         PTT - ( 03 Apr 2019 04:49 )  PTT:41.5 sec      PHYSICAL EXAM:  GENERAL: NAD, well-developed  	HEAD:  Atraumatic, Normocephalic  	EYES: EOMI, PERRLA, conjunctiva and sclera clear  	NECK: Supple, No JVD  	CHEST/LUNG: Clear to auscultation bilaterally; No wheeze  	HEART: Regular rate and rhythm; No murmurs, rubs, or gallops  	ABDOMEN: Soft, Nontender, Nondistended; Bowel sounds present  	EXTREMITIES:  2+ Peripheral Pulses, No clubbing, cyanosis, or edema  	PSYCH: AAOx3  	NEUROLOGY: non-focal  SKIN: No rashes or lesions.    ASSESSMENT & PLAN  78 year old with PMH of MI in 1995, systolic CHF s/p AICD, COPD on home oxygen, (rheumatic heart disease), bioprosthetic aortic valve, melanoma and squamous cell carcinoma s/p resections presents to the ED after being shocked twice by AICD , AICD interrogation showed 2 episodes of monomorphic V-tach for which patient was appropriately shocked.    # Recurrent Ventricular Tachycardia s/p AICD firing   - S/p Amiodarone bolus and currently on amiodarone 400mg oral daily.  -Got a nuclear stress test , old scar on the inferior wall due to previous MI.  -No new ischemia.  -Got a DEVICE UPGRADE YESTERDAY TO BIVENTRICULAR. LV Lead added.  -Device interrogation done this morning.  -Continue Coumadin tonight   -No heparin products or Lovenox.  -Will monitor one more day in CCU.  - Tomorrow possible downgrade/discharge.    # HFrEF/ CAD   - Torsemide 20mg BID and Spironolactone 12.5mg once daily.    # COPD on home O2 - stable   -Patient takes Spiriva and Incruse together and b blocker  - Hold b blocker, dicyclomine and Spiriva   - Albuterol PRN     # History of rheumatic heart disease s/p mechanical Mitral valve and bioprosthetic aortic valve.  - On coumadin to target INR of 2.5-3.5     Dispo - Home YONATHAN FAGAN 78y Male  MRN#: [id number]     SUBJECTIVE  Patient is a 78y old Male who presents with a chief complaint of AICD s/p firing (03 Apr 2019 09:18)  Currently admitted to medicine with the primary diagnosis of Ventricular tachycardia.    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  Right inguinal hernia: 2006  H/O surgery to major organs, presenting hazards to health: Cholecystectomy 2017  H/O surgery to heart and great vessels, presenting hazards to health: AVR (2015 / MVR (1995)  H/O surgery to major organs, presenting hazards to health: ICD Implant 2012    ALLERGIES:  No Known Allergies    MEDICATIONS:  STANDING MEDICATIONS  amiodarone    Tablet 400 milliGRAM(s) Oral daily  atorvastatin 40 milliGRAM(s) Oral at bedtime  BACItracin   Ointment 1 Application(s) Topical two times a day  chlorhexidine 4% Liquid 1 Application(s) Topical <User Schedule>  DOPamine Infusion 5 MICROgram(s)/kG/Min IV Continuous <Continuous>  mexiletine 200 milliGRAM(s) Oral two times a day  vancomycin  IVPB 1000 milliGRAM(s) IV Intermittent every 12 hours  warfarin 4 milliGRAM(s) Oral once    PRN MEDICATIONS  acetaminophen   Tablet .. 650 milliGRAM(s) Oral every 6 hours PRN      VITAL SIGNS: Last 24 Hours  T(C): 36.6 (03 Apr 2019 12:04), Max: 37.8 (03 Apr 2019 04:05)  T(F): 97.8 (03 Apr 2019 12:04), Max: 100 (03 Apr 2019 04:05)  HR: 90 (03 Apr 2019 12:04) (78 - 92)  BP: 130/60 (03 Apr 2019 12:04) (84/50 - 130/60)  BP(mean): 80 (03 Apr 2019 12:04) (58 - 90)  RR: 17 (03 Apr 2019 12:04) (17 - 31)  SpO2: 99% (03 Apr 2019 12:04) (91% - 99%)    LABS:                        13.8   10.20 )-----------( 164      ( 03 Apr 2019 04:49 )             42.9     04-03    137  |  99  |  27<H>  ----------------------------<  129<H>  4.5   |  24  |  1.4    Ca    9.1      03 Apr 2019 04:49  Mg     2.0     04-02      PT/INR - ( 03 Apr 2019 04:49 )   PT: 28.50 sec;   INR: 2.50 ratio         PTT - ( 03 Apr 2019 04:49 )  PTT:41.5 sec      PHYSICAL EXAM:  GENERAL: NAD, well-developed  	HEAD:  Atraumatic, Normocephalic  	EYES: EOMI, PERRLA, conjunctiva and sclera clear  	NECK: Supple, No JVD  	CHEST/LUNG: Clear to auscultation bilaterally; No wheeze  	HEART: Regular rate and rhythm; No murmurs, rubs, or gallops  	ABDOMEN: Soft, Nontender, Nondistended; Bowel sounds present  	EXTREMITIES:  2+ Peripheral Pulses, No clubbing, cyanosis, or edema  	PSYCH: AAOx3  	NEUROLOGY: non-focal  SKIN: No rashes or lesions.    ASSESSMENT & PLAN  78 year old with PMH of MI in 1995, systolic CHF s/p AICD, COPD on home oxygen, (rheumatic heart disease), bioprosthetic aortic valve, melanoma and squamous cell carcinoma s/p resections presents to the ED after being shocked twice by AICD , AICD interrogation showed 2 episodes of monomorphic V-tach for which patient was appropriately shocked.    # Recurrent Ventricular Tachycardia s/p AICD firing   - S/p Amiodarone bolus and currently on amiodarone 400mg oral daily.  -Got a nuclear stress test , old scar on the inferior wall due to previous MI.  -No new ischemia.  -Got a DEVICE UPGRADE YESTERDAY TO BIVENTRICULAR. LV Lead added.  -Device interrogation done this morning.  -Continue Coumadin tonight   -No heparin products or Lovenox.  -Will monitor one more day in CCU.  - Tomorrow possible downgrade/discharge.    # HFrEF/ CAD   - Torsemide 20mg BID and Spironolactone 12.5mg once daily.  -Became hypotensive after the procedure yesterday and today morning.  -Was on Dopamine infusion, d/cd today.  -Keep monitoring blood pressure    # COPD on home O2 - stable   -Patient takes Spiriva and Incruse together and b blocker  - Hold b blocker, dicyclomine and Spiriva   - Albuterol PRN     # History of rheumatic heart disease s/p mechanical Mitral valve and bioprosthetic aortic valve.  - On coumadin to target INR of 2.5-3.5     Dispo - Home YONATHAN FAGAN 78y Male  MRN#: [id number]     SUBJECTIVE  Patient is a 78y old Male who presents with a chief complaint of AICD s/p firing (03 Apr 2019 09:18)  Currently admitted to medicine with the primary diagnosis of Ventricular tachycardia.    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  Right inguinal hernia: 2006  H/O surgery to major organs, presenting hazards to health: Cholecystectomy 2017  H/O surgery to heart and great vessels, presenting hazards to health: AVR (2015 / MVR (1995)  H/O surgery to major organs, presenting hazards to health: ICD Implant 2012    ALLERGIES:  No Known Allergies    MEDICATIONS:  STANDING MEDICATIONS  amiodarone    Tablet 400 milliGRAM(s) Oral daily  atorvastatin 40 milliGRAM(s) Oral at bedtime  BACItracin   Ointment 1 Application(s) Topical two times a day  chlorhexidine 4% Liquid 1 Application(s) Topical <User Schedule>  DOPamine Infusion 5 MICROgram(s)/kG/Min IV Continuous <Continuous>  mexiletine 200 milliGRAM(s) Oral two times a day  vancomycin  IVPB 1000 milliGRAM(s) IV Intermittent every 12 hours  warfarin 4 milliGRAM(s) Oral once    PRN MEDICATIONS  acetaminophen   Tablet .. 650 milliGRAM(s) Oral every 6 hours PRN      VITAL SIGNS: Last 24 Hours  T(C): 36.6 (03 Apr 2019 12:04), Max: 37.8 (03 Apr 2019 04:05)  T(F): 97.8 (03 Apr 2019 12:04), Max: 100 (03 Apr 2019 04:05)  HR: 90 (03 Apr 2019 12:04) (78 - 92)  BP: 130/60 (03 Apr 2019 12:04) (84/50 - 130/60)  BP(mean): 80 (03 Apr 2019 12:04) (58 - 90)  RR: 17 (03 Apr 2019 12:04) (17 - 31)  SpO2: 99% (03 Apr 2019 12:04) (91% - 99%)    LABS:                        13.8   10.20 )-----------( 164      ( 03 Apr 2019 04:49 )             42.9     04-03    137  |  99  |  27<H>  ----------------------------<  129<H>  4.5   |  24  |  1.4    Ca    9.1      03 Apr 2019 04:49  Mg     2.0     04-02      PT/INR - ( 03 Apr 2019 04:49 )   PT: 28.50 sec;   INR: 2.50 ratio         PTT - ( 03 Apr 2019 04:49 )  PTT:41.5 sec      PHYSICAL EXAM:  GENERAL: NAD, well-developed  	HEAD:  Atraumatic, Normocephalic  	EYES: EOMI, PERRLA, conjunctiva and sclera clear  	NECK: Supple, No JVD  	CHEST/LUNG: Clear to auscultation bilaterally; No wheeze  	HEART: Regular rate and rhythm; No murmurs, rubs, or gallops  	ABDOMEN: Soft, Nontender, Nondistended; Bowel sounds present  	EXTREMITIES:  2+ Peripheral Pulses, No clubbing, cyanosis, or edema  	PSYCH: AAOx3  	NEUROLOGY: non-focal  SKIN: No rashes or lesions.    ASSESSMENT & PLAN  78 year old with PMH of MI in 1995, systolic CHF s/p AICD, COPD on home oxygen, (rheumatic heart disease), bioprosthetic aortic valve, melanoma and squamous cell carcinoma s/p resections presents to the ED after being shocked twice by AICD , AICD interrogation showed 2 episodes of monomorphic V-tach for which patient was appropriately shocked.    # Recurrent Ventricular Tachycardia s/p AICD firing   - S/p Amiodarone bolus and currently on amiodarone 400mg oral daily.  -Got a nuclear stress test , old scar on the inferior wall due to previous MI.  -No new ischemia.  -Got a DEVICE UPGRADE YESTERDAY TO BIVENTRICULAR. LV Lead added.  -Device interrogation done this morning.  -Continue Coumadin tonight   -No heparin products or Lovenox.  -Will monitor one more day in CCU.  - Tomorrow possible downgrade/discharge.    # chronic HFrEF/ CAD   - Torsemide 20mg BID and Spironolactone 12.5mg once daily.  -Became hypotensive after the procedure yesterday and today morning.  -Was on Dopamine infusion, d/cd today.  -Keep monitoring blood pressure    # COPD on home O2 - stable   -Patient takes Spiriva and Incruse together and b blocker  - Hold b blocker, dicyclomine and Spiriva   - Albuterol PRN     # History of rheumatic heart disease s/p mechanical Mitral valve and bioprosthetic aortic valve.  - On coumadin to target INR of 2.5-3.5     Dispo - Home

## 2019-04-03 NOTE — PROGRESS NOTE ADULT - ATTENDING COMMENTS
Patient is currently not available for medical evaluation during clinical rounds (undergoing surgical procedure)
Patient is seen and examined independently. I agree with resident note above and plan of care -edited and corrected where applicable.

## 2019-04-03 NOTE — PROGRESS NOTE ADULT - SUBJECTIVE AND OBJECTIVE BOX
INTERVAL HPI/OVERNIGHT EVENTS: No acute tele events overnight. Pt hypotensive after procedure and started on Dopamine drip. Maintaining stable BP now systolic in 100s and pt feeling much better, sitting in chair and eating, mentating well.    MEDICATIONS  (STANDING):  amiodarone    Tablet 400 milliGRAM(s) Oral daily  atorvastatin 40 milliGRAM(s) Oral at bedtime  BACItracin   Ointment 1 Application(s) Topical two times a day  chlorhexidine 4% Liquid 1 Application(s) Topical <User Schedule>  DOPamine Infusion 5 MICROgram(s)/kG/Min (11.25 mL/Hr) IV Continuous <Continuous>  mexiletine 200 milliGRAM(s) Oral two times a day  vancomycin  IVPB 1000 milliGRAM(s) IV Intermittent every 12 hours    MEDICATIONS  (PRN):  acetaminophen   Tablet .. 650 milliGRAM(s) Oral every 6 hours PRN Mild Pain (1 - 3)      Allergies    No Known Allergies    REVIEW OF SYSTEMS: Nausea resolved; Denies dizziness, CP or SOB.    Vital Signs Last 24 Hrs  T(C): 37.6 (03 Apr 2019 08:14), Max: 37.8 (03 Apr 2019 04:05)  T(F): 99.7 (03 Apr 2019 08:14), Max: 100 (03 Apr 2019 04:05)  HR: 92 (03 Apr 2019 08:14) (78 - 92)  BP: 104/55 (03 Apr 2019 08:14) (84/50 - 119/70)  BP(mean): 71 (03 Apr 2019 08:14) (58 - 96)  RR: 20 (03 Apr 2019 08:14) (17 - 31)  SpO2: 97% (03 Apr 2019 08:14) (91% - 99%)    04-02-19 @ 07:01  -  04-03-19 @ 07:00  --------------------------------------------------------  IN: 84.3 mL / OUT: 650 mL / NET: -565.7 mL    Physical Exam  GENERAL: In no apparent distress, well nourished, and hydrated.  HEART: Regular rate and rhythm; No murmurs, rubs, or gallops.  PULMONARY: Clear to auscultation and perfusion.  No rales, wheezing, or rhonchi bilaterally.  ABDOMEN: Soft, Nontender, Nondistended; Bowel sounds present  EXTREMITIES:  2+ Peripheral Pulses, No clubbing, cyanosis, or edema    LABS:                        13.8   10.20 )-----------( 164      ( 03 Apr 2019 04:49 )             42.9     04-03    137  |  99  |  27<H>  ----------------------------<  129<H>  4.5   |  24  |  1.4    Ca    9.1      03 Apr 2019 04:49  Mg     2.0     04-02      PT/INR - ( 03 Apr 2019 04:49 )   PT: 28.50 sec;   INR: 2.50 ratio         PTT - ( 03 Apr 2019 04:49 )  PTT:41.5 sec    TELE: V-Paced rhythm 72 bpm    RADIOLOGY & ADDITIONAL TESTS:  12 Lead ECG (04.03.19 @ 07:40)    Ventricular Rate 94 BPM    Atrial Rate 94 BPM    QRS Duration 148 ms    Q-T Interval 398 ms    QTC Calculation(Bezet) 497 ms    P Axis 64 degrees    R Axis 248 degrees    T Axis 47 degrees    Diagnosis Line Ventricular-paced rhythm  Abnormal ECG    Confirmed by Agusto Carrion ([id number]) on 4/3/2019 9:17:41 AM      Xray Chest 2 Views PA/Lat (04.03.19 @ 08:17)  EXAM:  XR CHEST PA LAT 2V            PROCEDURE DATE:  04/03/2019      INTERPRETATION:  Clinical History / Reason for exam: Postoperative,   coronary artery disease.    Comparison : Chest radiograph prior day.    Technique/Positioning: Frontaland lateral.    Findings:    Support devices: Unchanged.    Cardiac/mediastinum/hilum: Patient status post median sternotomy.    Lung parenchyma/Pleura: Minimal atelectasis.    Skeleton/soft tissues: Unremarkable.    Impression:      Minimal atelectasis. Support devices as described.    GARRISON WOOD M.D., ATTENDING RADIOLOGIST  This document has been electronically signed. Apr  3 2019  9:06AM

## 2019-04-03 NOTE — PROGRESS NOTE ADULT - ASSESSMENT
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 Pt found to have low EF 25%. Pt s/p Bi-V upgrade x 1 day. He became hypotensive after procedure and required dopamine drip, maintaining BP with systolic 100's at this time and feeling much better.     Plan:  S/P BiV Upgrade  - Decrease dopamine drip to 2.5 mcg at this time and if pt can tolerate wean off pressors today  - Maintain BP with systolic > 80  - Cont CCU monitoring  - Cont Coumadin  - Maintain electrolytes WNL  - Will cont to follow closely today Assessment: 78 year old with PMH of MI in 1995, systolic CHF s/p AICD, COPD on home oxygen, (rheumatic heart disease), bioprosthetic aortic valve, melanoma and squamous cell carcinoma s/p resections presents to the ED after being shocked twice by AICD , AICD interrogation showed 2 episodes of monomorphic V-tach for which patient was appropriately shocked. Pt found to have low EF 25%. Pt s/p Bi-V upgrade x 1 day. He became hypotensive after procedure and required dopamine drip, maintaining BP with systolic 100's at this time and feeling much better. He was c/o "twitching" near the diaphragm this AM, no SOB.     Plan:  S/P BiV Upgrade  - Decrease dopamine drip to 2.5 mcg at this time and if pt can tolerate wean off pressors today  - Maintain BP with systolic > 80  - Cont CCU monitoring  - Cont Coumadin  - Maintain electrolytes WNL  - Will cont to follow closely today  - Parameters changed on ICD as pt c/o diaphragmatic stimulation, now improved.

## 2019-04-04 ENCOUNTER — TRANSCRIPTION ENCOUNTER (OUTPATIENT)
Age: 79
End: 2019-04-04

## 2019-04-04 VITALS — DIASTOLIC BLOOD PRESSURE: 52 MMHG | SYSTOLIC BLOOD PRESSURE: 102 MMHG | HEART RATE: 84 BPM

## 2019-04-04 LAB
ANION GAP SERPL CALC-SCNC: 16 MMOL/L — HIGH (ref 7–14)
BUN SERPL-MCNC: 29 MG/DL — HIGH (ref 10–20)
CALCIUM SERPL-MCNC: 9.2 MG/DL — SIGNIFICANT CHANGE UP (ref 8.5–10.1)
CHLORIDE SERPL-SCNC: 97 MMOL/L — LOW (ref 98–110)
CO2 SERPL-SCNC: 21 MMOL/L — SIGNIFICANT CHANGE UP (ref 17–32)
CREAT SERPL-MCNC: 1.5 MG/DL — SIGNIFICANT CHANGE UP (ref 0.7–1.5)
GLUCOSE SERPL-MCNC: 114 MG/DL — HIGH (ref 70–99)
HCT VFR BLD CALC: 41 % — LOW (ref 42–52)
HGB BLD-MCNC: 13.3 G/DL — LOW (ref 14–18)
MCHC RBC-ENTMCNC: 28.3 PG — SIGNIFICANT CHANGE UP (ref 27–31)
MCHC RBC-ENTMCNC: 32.4 G/DL — SIGNIFICANT CHANGE UP (ref 32–37)
MCV RBC AUTO: 87.2 FL — SIGNIFICANT CHANGE UP (ref 80–94)
NRBC # BLD: 0 /100 WBCS — SIGNIFICANT CHANGE UP (ref 0–0)
PLATELET # BLD AUTO: 185 K/UL — SIGNIFICANT CHANGE UP (ref 130–400)
POTASSIUM SERPL-MCNC: 4.1 MMOL/L — SIGNIFICANT CHANGE UP (ref 3.5–5)
POTASSIUM SERPL-SCNC: 4.1 MMOL/L — SIGNIFICANT CHANGE UP (ref 3.5–5)
RBC # BLD: 4.7 M/UL — SIGNIFICANT CHANGE UP (ref 4.7–6.1)
RBC # FLD: 13.5 % — SIGNIFICANT CHANGE UP (ref 11.5–14.5)
SODIUM SERPL-SCNC: 134 MMOL/L — LOW (ref 135–146)
WBC # BLD: 13.63 K/UL — HIGH (ref 4.8–10.8)
WBC # FLD AUTO: 13.63 K/UL — HIGH (ref 4.8–10.8)

## 2019-04-04 PROCEDURE — 93010 ELECTROCARDIOGRAM REPORT: CPT

## 2019-04-04 RX ORDER — UMECLIDINIUM 62.5 UG/1
1 AEROSOL, POWDER ORAL
Qty: 0 | Refills: 0 | COMMUNITY

## 2019-04-04 RX ORDER — AMIODARONE HYDROCHLORIDE 400 MG/1
2 TABLET ORAL
Qty: 6 | Refills: 0
Start: 2019-04-04 | End: 2019-04-06

## 2019-04-04 RX ADMIN — Medication 1 APPLICATION(S): at 05:52

## 2019-04-04 RX ADMIN — MEXILETINE HYDROCHLORIDE 200 MILLIGRAM(S): 150 CAPSULE ORAL at 05:52

## 2019-04-04 RX ADMIN — AMIODARONE HYDROCHLORIDE 400 MILLIGRAM(S): 400 TABLET ORAL at 12:25

## 2019-04-04 RX ADMIN — CHLORHEXIDINE GLUCONATE 1 APPLICATION(S): 213 SOLUTION TOPICAL at 05:52

## 2019-04-04 NOTE — DISCHARGE NOTE PROVIDER - NSDCFUADDINST_GEN_ALL_CORE_FT
You have to take amiodarone 400mg for 3 days. Last day of taking it is on 4/7/19.    After that starting from 4/8/19 you have to take 300mg of amiodarone (1.5 tab of 200mg) daily until you meet Dr. Daily 2 weeks later

## 2019-04-04 NOTE — DISCHARGE NOTE PROVIDER - NSDCCPCAREPLAN_GEN_ALL_CORE_FT
PRINCIPAL DISCHARGE DIAGNOSIS  Diagnosis: Ventricular tachycardia  Assessment and Plan of Treatment: You came here with the firing of the AICD. You got a nuclear stress test which was negative for any new ischemia. Your device got upgraded to Biventicular. Left ventricular lead was added.      SECONDARY DISCHARGE DIAGNOSES  Diagnosis: H/O mitral valve replacement  Assessment and Plan of Treatment: You have a h/o mitral valve replacement. You have to keep taking coumadin to keep your blood thinner so that it does not clot around your valve. Please keep going to coumadin clinic to get your INR checked.    Diagnosis: COPD, mild  Assessment and Plan of Treatment: There are certain changes made to your COPD medications. Please keep taking spiriva and do not take any other inhaler.

## 2019-04-04 NOTE — DISCHARGE NOTE NURSING/CASE MANAGEMENT/SOCIAL WORK - NSDCDPATPORTLINK_GEN_ALL_CORE
You can access the [facility] Patient Portal, offered by [facility], by registering with the following website: [email or web address]

## 2019-04-04 NOTE — PROGRESS NOTE ADULT - SUBJECTIVE AND OBJECTIVE BOX
POST UPGRADE OF ICD DDD TO BIV  PT C/O  DIAPHRAGMATIC    STIMULATION SINCE UPGRADE  ICD INTERROGATED TO CHANGE THE VECTOR  REPROGRAMMED  TO STIMULATE FROM ELECTRODE 2/3 THRESOLD 1.75 @ 0.5 msec  with  no evidence of diaphragmaic stimulaion  programmmed  low rate to 85/min POST UPGRADE OF ICD DDD TO BIV  PT C/O  DIAPHRAGMATIC    STIMULATION SINCE UPGRADE  ICD INTERROGATED TO CHANGE THE VECTOR  REPROGRAMMED  TO STIMULATE FROM ELECTRODE 2/3 THRESOLD 1.75 @ 0.5 msec  with  no evidence of diaphragmaic stimulaion  programmmed  low rate to 85/min     EXAM   PULSE REGULAR AT 84/MIN /57 MAP-78  HT S S1 S2  DISTANT  LUNGS CLEAR    RECOMMEND   CONTINUE AMIODARONE 400 MG/D  TILL 4/7/19  THEN 300 MG/D  SPIRIVA  AS BRONCHODILATOR  MG OXIDE 500 MG/D    FU IN 4 WEEKSI

## 2019-04-04 NOTE — DISCHARGE NOTE PROVIDER - CARE PROVIDER_API CALL
Kamryn Fraser)  Cardiology; Internal Medicine  [address] [place] [address]  Phone: [phone]  Fax: [phone]  Follow Up Time: 2 weeks

## 2019-04-04 NOTE — PROGRESS NOTE ADULT - REASON FOR ADMISSION
AICD s/p firing

## 2019-04-04 NOTE — DISCHARGE NOTE PROVIDER - HOSPITAL COURSE
78 year old with PMH of MI in 1995, systolic CHF s/p AICD, COPD on home oxygen, (rheumatic heart disease), bioprosthetic aortic valve, melanoma and squamous cell carcinoma s/p resections presents to the ED after being shocked twice by AICD , AICD interrogation showed 2 episodes of monomorphic V-tach for which patient was appropriately shocked.        # Recurrent Ventricular Tachycardia s/p AICD firing     -Got a nuclear stress test , old scar on the inferior wall due to previous MI.    -No new ischemia.    -Got a DEVICE UPGRADE TO BIVENTRICULAR. LV Lead added.    -Device interrogation done.    -was kept on coumadin and heparin for anticoagulation.        # chronic HFrEF/ CAD     - Torsemide 20mg BID and Spironolactone 12.5mg once daily.    -Was kept on dopamine effusion because of hypotension which was d/cd later as the blood pressure got stabilized.        # COPD on home O2 - stable     -D/C on spiriva.    -All other inhalers d/cd        # History of rheumatic heart disease s/p mechanical Mitral valve and bioprosthetic aortic valve.    - On coumadin to target INR of 2.5-3.5         Sending home today.

## 2019-04-09 ENCOUNTER — OUTPATIENT (OUTPATIENT)
Dept: OUTPATIENT SERVICES | Facility: HOSPITAL | Age: 79
LOS: 1 days | Discharge: HOME | End: 2019-04-09

## 2019-04-09 DIAGNOSIS — Z95.1 PRESENCE OF AORTOCORONARY BYPASS GRAFT: Chronic | ICD-10-CM

## 2019-04-09 DIAGNOSIS — S68.119A COMPLETE TRAUMATIC METACARPOPHALANGEAL AMPUTATION OF UNSPECIFIED FINGER, INITIAL ENCOUNTER: Chronic | ICD-10-CM

## 2019-04-09 DIAGNOSIS — Z98.890 OTHER SPECIFIED POSTPROCEDURAL STATES: Chronic | ICD-10-CM

## 2019-04-09 DIAGNOSIS — Z79.01 LONG TERM (CURRENT) USE OF ANTICOAGULANTS: ICD-10-CM

## 2019-04-09 DIAGNOSIS — K40.90 UNILATERAL INGUINAL HERNIA, WITHOUT OBSTRUCTION OR GANGRENE, NOT SPECIFIED AS RECURRENT: Chronic | ICD-10-CM

## 2019-04-09 DIAGNOSIS — Z95.2 PRESENCE OF PROSTHETIC HEART VALVE: ICD-10-CM

## 2019-04-09 LAB
POCT INR: 2.2 RATIO — HIGH (ref 0.9–1.2)
POCT PT: 26.9 SEC — HIGH (ref 10–13.4)

## 2019-04-12 ENCOUNTER — APPOINTMENT (OUTPATIENT)
Dept: CARDIOLOGY | Facility: CLINIC | Age: 79
End: 2019-04-12
Payer: COMMERCIAL

## 2019-04-12 VITALS — DIASTOLIC BLOOD PRESSURE: 60 MMHG | SYSTOLIC BLOOD PRESSURE: 100 MMHG

## 2019-04-12 VITALS — HEIGHT: 65 IN | WEIGHT: 160 LBS | HEART RATE: 84 BPM | BODY MASS INDEX: 26.66 KG/M2

## 2019-04-12 DIAGNOSIS — I50.22 CHRONIC SYSTOLIC (CONGESTIVE) HEART FAILURE: ICD-10-CM

## 2019-04-12 DIAGNOSIS — I47.2 VENTRICULAR TACHYCARDIA: ICD-10-CM

## 2019-04-12 DIAGNOSIS — I48.1 PERSISTENT ATRIAL FIBRILLATION: ICD-10-CM

## 2019-04-12 PROCEDURE — 93284 PRGRMG EVAL IMPLANTABLE DFB: CPT

## 2019-04-12 PROCEDURE — 99213 OFFICE O/P EST LOW 20 MIN: CPT | Mod: 25

## 2019-04-13 ENCOUNTER — TRANSCRIPTION ENCOUNTER (OUTPATIENT)
Age: 79
End: 2019-04-13

## 2019-04-13 DIAGNOSIS — N18.2 CHRONIC KIDNEY DISEASE, STAGE 2 (MILD): ICD-10-CM

## 2019-04-13 DIAGNOSIS — Z95.2 PRESENCE OF PROSTHETIC HEART VALVE: ICD-10-CM

## 2019-04-13 DIAGNOSIS — I42.9 CARDIOMYOPATHY, UNSPECIFIED: ICD-10-CM

## 2019-04-13 DIAGNOSIS — I71.2 THORACIC AORTIC ANEURYSM, WITHOUT RUPTURE: ICD-10-CM

## 2019-04-13 DIAGNOSIS — Z99.81 DEPENDENCE ON SUPPLEMENTAL OXYGEN: ICD-10-CM

## 2019-04-13 DIAGNOSIS — Z95.1 PRESENCE OF AORTOCORONARY BYPASS GRAFT: ICD-10-CM

## 2019-04-13 DIAGNOSIS — I50.22 CHRONIC SYSTOLIC (CONGESTIVE) HEART FAILURE: ICD-10-CM

## 2019-04-13 DIAGNOSIS — Z95.3 PRESENCE OF XENOGENIC HEART VALVE: ICD-10-CM

## 2019-04-13 DIAGNOSIS — Z89.022 ACQUIRED ABSENCE OF LEFT FINGER(S): ICD-10-CM

## 2019-04-13 DIAGNOSIS — Z85.820 PERSONAL HISTORY OF MALIGNANT MELANOMA OF SKIN: ICD-10-CM

## 2019-04-13 DIAGNOSIS — J44.9 CHRONIC OBSTRUCTIVE PULMONARY DISEASE, UNSPECIFIED: ICD-10-CM

## 2019-04-13 DIAGNOSIS — E83.42 HYPOMAGNESEMIA: ICD-10-CM

## 2019-04-13 DIAGNOSIS — Z95.810 PRESENCE OF AUTOMATIC (IMPLANTABLE) CARDIAC DEFIBRILLATOR: ICD-10-CM

## 2019-04-13 DIAGNOSIS — I13.0 HYPERTENSIVE HEART AND CHRONIC KIDNEY DISEASE WITH HEART FAILURE AND STAGE 1 THROUGH STAGE 4 CHRONIC KIDNEY DISEASE, OR UNSPECIFIED CHRONIC KIDNEY DISEASE: ICD-10-CM

## 2019-04-13 DIAGNOSIS — I47.2 VENTRICULAR TACHYCARDIA: ICD-10-CM

## 2019-04-13 DIAGNOSIS — I25.2 OLD MYOCARDIAL INFARCTION: ICD-10-CM

## 2019-04-13 DIAGNOSIS — I48.91 UNSPECIFIED ATRIAL FIBRILLATION: ICD-10-CM

## 2019-04-13 DIAGNOSIS — I25.5 ISCHEMIC CARDIOMYOPATHY: ICD-10-CM

## 2019-04-13 DIAGNOSIS — Z79.01 LONG TERM (CURRENT) USE OF ANTICOAGULANTS: ICD-10-CM

## 2019-04-13 DIAGNOSIS — I82.B12 ACUTE EMBOLISM AND THROMBOSIS OF LEFT SUBCLAVIAN VEIN: ICD-10-CM

## 2019-04-13 DIAGNOSIS — I82.210 ACUTE EMBOLISM AND THROMBOSIS OF SUPERIOR VENA CAVA: ICD-10-CM

## 2019-04-13 DIAGNOSIS — I44.7 LEFT BUNDLE-BRANCH BLOCK, UNSPECIFIED: ICD-10-CM

## 2019-04-13 DIAGNOSIS — Z87.891 PERSONAL HISTORY OF NICOTINE DEPENDENCE: ICD-10-CM

## 2019-04-13 DIAGNOSIS — I95.81 POSTPROCEDURAL HYPOTENSION: ICD-10-CM

## 2019-04-13 DIAGNOSIS — M19.90 UNSPECIFIED OSTEOARTHRITIS, UNSPECIFIED SITE: ICD-10-CM

## 2019-04-13 DIAGNOSIS — I25.10 ATHEROSCLEROTIC HEART DISEASE OF NATIVE CORONARY ARTERY WITHOUT ANGINA PECTORIS: ICD-10-CM

## 2019-04-13 DIAGNOSIS — E78.5 HYPERLIPIDEMIA, UNSPECIFIED: ICD-10-CM

## 2019-04-25 ENCOUNTER — OUTPATIENT (OUTPATIENT)
Dept: OUTPATIENT SERVICES | Facility: HOSPITAL | Age: 79
LOS: 1 days | Discharge: HOME | End: 2019-04-25

## 2019-04-25 DIAGNOSIS — K40.90 UNILATERAL INGUINAL HERNIA, WITHOUT OBSTRUCTION OR GANGRENE, NOT SPECIFIED AS RECURRENT: Chronic | ICD-10-CM

## 2019-04-25 DIAGNOSIS — Z98.890 OTHER SPECIFIED POSTPROCEDURAL STATES: Chronic | ICD-10-CM

## 2019-04-25 DIAGNOSIS — S68.119A COMPLETE TRAUMATIC METACARPOPHALANGEAL AMPUTATION OF UNSPECIFIED FINGER, INITIAL ENCOUNTER: Chronic | ICD-10-CM

## 2019-04-25 DIAGNOSIS — Z95.2 PRESENCE OF PROSTHETIC HEART VALVE: ICD-10-CM

## 2019-04-25 DIAGNOSIS — Z95.1 PRESENCE OF AORTOCORONARY BYPASS GRAFT: Chronic | ICD-10-CM

## 2019-04-25 DIAGNOSIS — Z79.01 LONG TERM (CURRENT) USE OF ANTICOAGULANTS: ICD-10-CM

## 2019-04-25 LAB
POCT INR: 1.5 RATIO — HIGH (ref 0.9–1.2)
POCT PT: 18.3 SEC — HIGH (ref 10–13.4)

## 2019-04-27 ENCOUNTER — OUTPATIENT (OUTPATIENT)
Dept: OUTPATIENT SERVICES | Facility: HOSPITAL | Age: 79
LOS: 1 days | Discharge: HOME | End: 2019-04-27

## 2019-04-27 DIAGNOSIS — S68.119A COMPLETE TRAUMATIC METACARPOPHALANGEAL AMPUTATION OF UNSPECIFIED FINGER, INITIAL ENCOUNTER: Chronic | ICD-10-CM

## 2019-04-27 DIAGNOSIS — Z95.2 PRESENCE OF PROSTHETIC HEART VALVE: ICD-10-CM

## 2019-04-27 DIAGNOSIS — Z79.01 LONG TERM (CURRENT) USE OF ANTICOAGULANTS: ICD-10-CM

## 2019-04-27 DIAGNOSIS — Z98.890 OTHER SPECIFIED POSTPROCEDURAL STATES: Chronic | ICD-10-CM

## 2019-04-27 DIAGNOSIS — Z95.1 PRESENCE OF AORTOCORONARY BYPASS GRAFT: Chronic | ICD-10-CM

## 2019-04-27 DIAGNOSIS — K40.90 UNILATERAL INGUINAL HERNIA, WITHOUT OBSTRUCTION OR GANGRENE, NOT SPECIFIED AS RECURRENT: Chronic | ICD-10-CM

## 2019-04-27 LAB
POCT INR: 1.5 RATIO — HIGH (ref 0.9–1.2)
POCT PT: 18.5 SEC — HIGH (ref 10–13.4)

## 2019-04-29 ENCOUNTER — OUTPATIENT (OUTPATIENT)
Dept: OUTPATIENT SERVICES | Facility: HOSPITAL | Age: 79
LOS: 1 days | Discharge: HOME | End: 2019-04-29

## 2019-04-29 DIAGNOSIS — Z98.890 OTHER SPECIFIED POSTPROCEDURAL STATES: Chronic | ICD-10-CM

## 2019-04-29 DIAGNOSIS — K40.90 UNILATERAL INGUINAL HERNIA, WITHOUT OBSTRUCTION OR GANGRENE, NOT SPECIFIED AS RECURRENT: Chronic | ICD-10-CM

## 2019-04-29 DIAGNOSIS — Z95.1 PRESENCE OF AORTOCORONARY BYPASS GRAFT: Chronic | ICD-10-CM

## 2019-04-29 DIAGNOSIS — Z79.01 LONG TERM (CURRENT) USE OF ANTICOAGULANTS: ICD-10-CM

## 2019-04-29 DIAGNOSIS — Z95.2 PRESENCE OF PROSTHETIC HEART VALVE: ICD-10-CM

## 2019-04-29 DIAGNOSIS — S68.119A COMPLETE TRAUMATIC METACARPOPHALANGEAL AMPUTATION OF UNSPECIFIED FINGER, INITIAL ENCOUNTER: Chronic | ICD-10-CM

## 2019-05-02 ENCOUNTER — APPOINTMENT (OUTPATIENT)
Dept: CARDIOLOGY | Facility: CLINIC | Age: 79
End: 2019-05-02

## 2019-05-02 ENCOUNTER — OUTPATIENT (OUTPATIENT)
Dept: OUTPATIENT SERVICES | Facility: HOSPITAL | Age: 79
LOS: 1 days | Discharge: HOME | End: 2019-05-02

## 2019-05-02 DIAGNOSIS — Z98.890 OTHER SPECIFIED POSTPROCEDURAL STATES: Chronic | ICD-10-CM

## 2019-05-02 DIAGNOSIS — Z79.01 LONG TERM (CURRENT) USE OF ANTICOAGULANTS: ICD-10-CM

## 2019-05-02 DIAGNOSIS — S68.119A COMPLETE TRAUMATIC METACARPOPHALANGEAL AMPUTATION OF UNSPECIFIED FINGER, INITIAL ENCOUNTER: Chronic | ICD-10-CM

## 2019-05-02 DIAGNOSIS — Z95.1 PRESENCE OF AORTOCORONARY BYPASS GRAFT: Chronic | ICD-10-CM

## 2019-05-02 DIAGNOSIS — Z95.2 PRESENCE OF PROSTHETIC HEART VALVE: ICD-10-CM

## 2019-05-02 DIAGNOSIS — K40.90 UNILATERAL INGUINAL HERNIA, WITHOUT OBSTRUCTION OR GANGRENE, NOT SPECIFIED AS RECURRENT: Chronic | ICD-10-CM

## 2019-05-02 LAB
POCT INR: 2.1 RATIO — HIGH (ref 0.9–1.2)
POCT PT: 25.2 SEC — HIGH (ref 10–13.4)

## 2019-05-09 ENCOUNTER — OUTPATIENT (OUTPATIENT)
Dept: OUTPATIENT SERVICES | Facility: HOSPITAL | Age: 79
LOS: 1 days | Discharge: HOME | End: 2019-05-09

## 2019-05-09 DIAGNOSIS — Z98.890 OTHER SPECIFIED POSTPROCEDURAL STATES: Chronic | ICD-10-CM

## 2019-05-09 DIAGNOSIS — Z79.01 LONG TERM (CURRENT) USE OF ANTICOAGULANTS: ICD-10-CM

## 2019-05-09 DIAGNOSIS — Z95.2 PRESENCE OF PROSTHETIC HEART VALVE: ICD-10-CM

## 2019-05-09 DIAGNOSIS — S68.119A COMPLETE TRAUMATIC METACARPOPHALANGEAL AMPUTATION OF UNSPECIFIED FINGER, INITIAL ENCOUNTER: Chronic | ICD-10-CM

## 2019-05-09 DIAGNOSIS — Z95.1 PRESENCE OF AORTOCORONARY BYPASS GRAFT: Chronic | ICD-10-CM

## 2019-05-09 DIAGNOSIS — K40.90 UNILATERAL INGUINAL HERNIA, WITHOUT OBSTRUCTION OR GANGRENE, NOT SPECIFIED AS RECURRENT: Chronic | ICD-10-CM

## 2019-05-09 LAB
POCT INR: 1.7 RATIO — HIGH (ref 0.9–1.2)
POCT PT: 20 SEC — HIGH (ref 10–13.4)

## 2019-05-14 ENCOUNTER — OUTPATIENT (OUTPATIENT)
Dept: OUTPATIENT SERVICES | Facility: HOSPITAL | Age: 79
LOS: 1 days | Discharge: HOME | End: 2019-05-14

## 2019-05-14 DIAGNOSIS — Z98.890 OTHER SPECIFIED POSTPROCEDURAL STATES: Chronic | ICD-10-CM

## 2019-05-14 DIAGNOSIS — K40.90 UNILATERAL INGUINAL HERNIA, WITHOUT OBSTRUCTION OR GANGRENE, NOT SPECIFIED AS RECURRENT: Chronic | ICD-10-CM

## 2019-05-14 DIAGNOSIS — S68.119A COMPLETE TRAUMATIC METACARPOPHALANGEAL AMPUTATION OF UNSPECIFIED FINGER, INITIAL ENCOUNTER: Chronic | ICD-10-CM

## 2019-05-14 DIAGNOSIS — Z95.2 PRESENCE OF PROSTHETIC HEART VALVE: ICD-10-CM

## 2019-05-14 DIAGNOSIS — Z79.01 LONG TERM (CURRENT) USE OF ANTICOAGULANTS: ICD-10-CM

## 2019-05-14 DIAGNOSIS — Z95.1 PRESENCE OF AORTOCORONARY BYPASS GRAFT: Chronic | ICD-10-CM

## 2019-05-14 LAB
POCT INR: 2 RATIO — HIGH (ref 0.9–1.2)
POCT PT: 23.8 SEC — HIGH (ref 10–13.4)

## 2019-05-17 ENCOUNTER — TRANSCRIPTION ENCOUNTER (OUTPATIENT)
Age: 79
End: 2019-05-17

## 2019-05-20 ENCOUNTER — OUTPATIENT (OUTPATIENT)
Dept: OUTPATIENT SERVICES | Facility: HOSPITAL | Age: 79
LOS: 1 days | Discharge: HOME | End: 2019-05-20

## 2019-05-20 DIAGNOSIS — Z98.890 OTHER SPECIFIED POSTPROCEDURAL STATES: Chronic | ICD-10-CM

## 2019-05-20 DIAGNOSIS — S68.119A COMPLETE TRAUMATIC METACARPOPHALANGEAL AMPUTATION OF UNSPECIFIED FINGER, INITIAL ENCOUNTER: Chronic | ICD-10-CM

## 2019-05-20 DIAGNOSIS — Z79.01 LONG TERM (CURRENT) USE OF ANTICOAGULANTS: ICD-10-CM

## 2019-05-20 DIAGNOSIS — K40.90 UNILATERAL INGUINAL HERNIA, WITHOUT OBSTRUCTION OR GANGRENE, NOT SPECIFIED AS RECURRENT: Chronic | ICD-10-CM

## 2019-05-20 DIAGNOSIS — Z95.2 PRESENCE OF PROSTHETIC HEART VALVE: ICD-10-CM

## 2019-05-20 DIAGNOSIS — Z95.1 PRESENCE OF AORTOCORONARY BYPASS GRAFT: Chronic | ICD-10-CM

## 2019-05-20 LAB
POCT INR: 4 RATIO — HIGH (ref 0.9–1.2)
POCT PT: 48 SEC — HIGH (ref 10–13.4)

## 2019-05-23 ENCOUNTER — OUTPATIENT (OUTPATIENT)
Dept: OUTPATIENT SERVICES | Facility: HOSPITAL | Age: 79
LOS: 1 days | Discharge: HOME | End: 2019-05-23

## 2019-05-23 DIAGNOSIS — Z79.01 LONG TERM (CURRENT) USE OF ANTICOAGULANTS: ICD-10-CM

## 2019-05-23 DIAGNOSIS — S68.119A COMPLETE TRAUMATIC METACARPOPHALANGEAL AMPUTATION OF UNSPECIFIED FINGER, INITIAL ENCOUNTER: Chronic | ICD-10-CM

## 2019-05-23 DIAGNOSIS — Z95.1 PRESENCE OF AORTOCORONARY BYPASS GRAFT: Chronic | ICD-10-CM

## 2019-05-23 DIAGNOSIS — Z95.2 PRESENCE OF PROSTHETIC HEART VALVE: ICD-10-CM

## 2019-05-23 DIAGNOSIS — Z98.890 OTHER SPECIFIED POSTPROCEDURAL STATES: Chronic | ICD-10-CM

## 2019-05-23 DIAGNOSIS — K40.90 UNILATERAL INGUINAL HERNIA, WITHOUT OBSTRUCTION OR GANGRENE, NOT SPECIFIED AS RECURRENT: Chronic | ICD-10-CM

## 2019-05-23 LAB
POCT INR: 2.8 RATIO — HIGH (ref 0.9–1.2)
POCT PT: 33.4 SEC — HIGH (ref 10–13.4)

## 2019-05-27 PROBLEM — I47.2 VENTRICULAR TACHYCARDIA: Status: ACTIVE | Noted: 2019-05-27

## 2019-05-27 PROBLEM — I48.1 PERSISTENT ATRIAL FIBRILLATION: Status: ACTIVE | Noted: 2019-05-27

## 2019-05-27 PROBLEM — I50.22 CHRONIC SYSTOLIC CONGESTIVE HEART FAILURE: Status: ACTIVE | Noted: 2019-05-27

## 2019-05-27 RX ORDER — WARFARIN SODIUM 6 MG/1
TABLET ORAL
Refills: 0 | Status: ACTIVE | COMMUNITY

## 2019-05-27 RX ORDER — AMIODARONE HYDROCHLORIDE 100 MG/1
100 TABLET ORAL
Refills: 0 | Status: ACTIVE | COMMUNITY

## 2019-05-27 NOTE — PHYSICAL EXAM
[Normal Appearance] : normal appearance [General Appearance - Well Developed] : well developed [Well Groomed] : well groomed [General Appearance - Well Nourished] : well nourished [Heart Rate And Rhythm] : heart rate and rhythm were normal [No Deformities] : no deformities [General Appearance - In No Acute Distress] : no acute distress [Respiration, Rhythm And Depth] : normal respiratory rhythm and effort [Heart Sounds] : normal S1 and S2 [Murmurs] : no murmurs present [Exaggerated Use Of Accessory Muscles For Inspiration] : no accessory muscle use [Auscultation Breath Sounds / Voice Sounds] : lungs were clear to auscultation bilaterally [Clean] : clean [Abdomen Tenderness] : non-tender [Dry] : dry [Abdomen Soft] : soft [Well-Healed] : well-healed [Abdomen Mass (___ Cm)] : no abdominal mass palpated [Nail Clubbing] : no clubbing of the fingernails [] : no ischemic changes [Cyanosis, Localized] : no localized cyanosis [Petechial Hemorrhages (___cm)] : no petechial hemorrhages

## 2019-05-27 NOTE — PROCEDURE
[No] : not [NSR] : normal sinus rhythm [CRT-D] : Cardiac resynchronization therapy defibrillator [Voltage: ___ volts] : Voltage was [unfilled] volts [DDD] : DDD [Longevity: ___ months] : The estimated remaining battery life is [unfilled] months [Threshold Testing Performed] : Threshold testing was performed [Atrial] : Atrial [Ventricular] : Ventricular [Sensing Amplitude ___mv] : sensing amplitude was [unfilled] mv [Lead Imp:  ___ohms] : lead impedance was [unfilled] ohms [___V @] : [unfilled] V [___ ms] : [unfilled] ms [None] : none [Programmed for Longevity] : output reprogrammed for improved battery longevity [Asense-Vsense ___ %] : Asense-Vsense [unfilled]% [Apace-Vsense ___ %] : Apace-Vsense [unfilled]% [Asense-Vpace ___ %] : Asense-Vpace [unfilled]% [Apace-Vpace ___ %] : Apace-Vpace [unfilled]% [de-identified] : Ailyn PATEL Quad [de-identified] : 80 BPM [de-identified] : DAVID [de-identified] : 3/22/2019 [de-identified] : [id number] [de-identified] :  [de-identified] : No events

## 2019-05-27 NOTE — HISTORY OF PRESENT ILLNESS
[None] : The patient complains of no symptoms [Palpitations] : no palpitations [SOB] : no dyspnea [Syncope] : no syncope [Dizziness] : no dizziness [Chest Pain] : no chest pain or discomfort [ICD Shocks] : no recent ICD shocks [Erythema at Site] : no erythema at device site [Swelling at Site] : no swelling at device site [de-identified] : Patient with history of AF, Vt, ICD implant MVR.

## 2019-05-28 ENCOUNTER — OUTPATIENT (OUTPATIENT)
Dept: OUTPATIENT SERVICES | Facility: HOSPITAL | Age: 79
LOS: 1 days | Discharge: HOME | End: 2019-05-28

## 2019-05-28 DIAGNOSIS — Z79.01 LONG TERM (CURRENT) USE OF ANTICOAGULANTS: ICD-10-CM

## 2019-05-28 DIAGNOSIS — Z95.1 PRESENCE OF AORTOCORONARY BYPASS GRAFT: Chronic | ICD-10-CM

## 2019-05-28 DIAGNOSIS — Z98.890 OTHER SPECIFIED POSTPROCEDURAL STATES: Chronic | ICD-10-CM

## 2019-05-28 DIAGNOSIS — S68.119A COMPLETE TRAUMATIC METACARPOPHALANGEAL AMPUTATION OF UNSPECIFIED FINGER, INITIAL ENCOUNTER: Chronic | ICD-10-CM

## 2019-05-28 DIAGNOSIS — K40.90 UNILATERAL INGUINAL HERNIA, WITHOUT OBSTRUCTION OR GANGRENE, NOT SPECIFIED AS RECURRENT: Chronic | ICD-10-CM

## 2019-05-28 DIAGNOSIS — Z95.2 PRESENCE OF PROSTHETIC HEART VALVE: ICD-10-CM

## 2019-05-28 LAB
POCT INR: 3 RATIO — HIGH (ref 0.9–1.2)
POCT PT: 36.4 SEC — HIGH (ref 10–13.4)

## 2019-05-29 ENCOUNTER — TRANSCRIPTION ENCOUNTER (OUTPATIENT)
Age: 79
End: 2019-05-29

## 2019-05-29 ENCOUNTER — EMERGENCY (EMERGENCY)
Facility: HOSPITAL | Age: 79
LOS: 0 days | Discharge: HOME | End: 2019-05-29
Attending: STUDENT IN AN ORGANIZED HEALTH CARE EDUCATION/TRAINING PROGRAM | Admitting: STUDENT IN AN ORGANIZED HEALTH CARE EDUCATION/TRAINING PROGRAM
Payer: COMMERCIAL

## 2019-05-29 VITALS
HEART RATE: 87 BPM | OXYGEN SATURATION: 100 % | DIASTOLIC BLOOD PRESSURE: 63 MMHG | SYSTOLIC BLOOD PRESSURE: 101 MMHG | RESPIRATION RATE: 18 BRPM | TEMPERATURE: 97 F

## 2019-05-29 VITALS — HEART RATE: 83 BPM | SYSTOLIC BLOOD PRESSURE: 107 MMHG | DIASTOLIC BLOOD PRESSURE: 63 MMHG

## 2019-05-29 DIAGNOSIS — Z87.891 PERSONAL HISTORY OF NICOTINE DEPENDENCE: ICD-10-CM

## 2019-05-29 DIAGNOSIS — E78.5 HYPERLIPIDEMIA, UNSPECIFIED: ICD-10-CM

## 2019-05-29 DIAGNOSIS — Z98.890 OTHER SPECIFIED POSTPROCEDURAL STATES: Chronic | ICD-10-CM

## 2019-05-29 DIAGNOSIS — Z95.1 PRESENCE OF AORTOCORONARY BYPASS GRAFT: Chronic | ICD-10-CM

## 2019-05-29 DIAGNOSIS — I10 ESSENTIAL (PRIMARY) HYPERTENSION: ICD-10-CM

## 2019-05-29 DIAGNOSIS — I25.10 ATHEROSCLEROTIC HEART DISEASE OF NATIVE CORONARY ARTERY WITHOUT ANGINA PECTORIS: ICD-10-CM

## 2019-05-29 DIAGNOSIS — I95.9 HYPOTENSION, UNSPECIFIED: ICD-10-CM

## 2019-05-29 DIAGNOSIS — Z79.899 OTHER LONG TERM (CURRENT) DRUG THERAPY: ICD-10-CM

## 2019-05-29 DIAGNOSIS — S68.119A COMPLETE TRAUMATIC METACARPOPHALANGEAL AMPUTATION OF UNSPECIFIED FINGER, INITIAL ENCOUNTER: Chronic | ICD-10-CM

## 2019-05-29 DIAGNOSIS — R42 DIZZINESS AND GIDDINESS: ICD-10-CM

## 2019-05-29 DIAGNOSIS — R11.0 NAUSEA: ICD-10-CM

## 2019-05-29 DIAGNOSIS — K40.90 UNILATERAL INGUINAL HERNIA, WITHOUT OBSTRUCTION OR GANGRENE, NOT SPECIFIED AS RECURRENT: Chronic | ICD-10-CM

## 2019-05-29 LAB
ALBUMIN SERPL ELPH-MCNC: 4.1 G/DL — SIGNIFICANT CHANGE UP (ref 3.5–5.2)
ALP SERPL-CCNC: 75 U/L — SIGNIFICANT CHANGE UP (ref 30–115)
ALT FLD-CCNC: 32 U/L — SIGNIFICANT CHANGE UP (ref 0–41)
ANION GAP SERPL CALC-SCNC: 11 MMOL/L — SIGNIFICANT CHANGE UP (ref 7–14)
APPEARANCE UR: ABNORMAL
APTT BLD: 43.2 SEC — HIGH (ref 27–39.2)
AST SERPL-CCNC: 28 U/L — SIGNIFICANT CHANGE UP (ref 0–41)
BASE EXCESS BLDV CALC-SCNC: 2.8 MMOL/L — HIGH (ref -2–2)
BASOPHILS # BLD AUTO: 0.04 K/UL — SIGNIFICANT CHANGE UP (ref 0–0.2)
BASOPHILS NFR BLD AUTO: 0.5 % — SIGNIFICANT CHANGE UP (ref 0–1)
BILIRUB SERPL-MCNC: 0.6 MG/DL — SIGNIFICANT CHANGE UP (ref 0.2–1.2)
BILIRUB UR-MCNC: NEGATIVE — SIGNIFICANT CHANGE UP
BUN SERPL-MCNC: 24 MG/DL — HIGH (ref 10–20)
CA-I SERPL-SCNC: 1.27 MMOL/L — SIGNIFICANT CHANGE UP (ref 1.12–1.3)
CALCIUM SERPL-MCNC: 9.7 MG/DL — SIGNIFICANT CHANGE UP (ref 8.5–10.1)
CHLORIDE SERPL-SCNC: 100 MMOL/L — SIGNIFICANT CHANGE UP (ref 98–110)
CO2 SERPL-SCNC: 26 MMOL/L — SIGNIFICANT CHANGE UP (ref 17–32)
COLOR SPEC: YELLOW — SIGNIFICANT CHANGE UP
CREAT SERPL-MCNC: 1.4 MG/DL — SIGNIFICANT CHANGE UP (ref 0.7–1.5)
DIFF PNL FLD: NEGATIVE — SIGNIFICANT CHANGE UP
EOSINOPHIL # BLD AUTO: 0.03 K/UL — SIGNIFICANT CHANGE UP (ref 0–0.7)
EOSINOPHIL NFR BLD AUTO: 0.4 % — SIGNIFICANT CHANGE UP (ref 0–8)
EPI CELLS # UR: ABNORMAL /HPF
GAS PNL BLDV: 137 MMOL/L — SIGNIFICANT CHANGE UP (ref 136–145)
GAS PNL BLDV: SIGNIFICANT CHANGE UP
GLUCOSE SERPL-MCNC: 108 MG/DL — HIGH (ref 70–99)
GLUCOSE UR QL: NEGATIVE MG/DL — SIGNIFICANT CHANGE UP
HCO3 BLDV-SCNC: 30 MMOL/L — HIGH (ref 22–29)
HCT VFR BLD CALC: 42.3 % — SIGNIFICANT CHANGE UP (ref 42–52)
HCT VFR BLDA CALC: 44.8 % — HIGH (ref 34–44)
HGB BLD CALC-MCNC: 14.6 G/DL — SIGNIFICANT CHANGE UP (ref 14–18)
HGB BLD-MCNC: 13.9 G/DL — LOW (ref 14–18)
IMM GRANULOCYTES NFR BLD AUTO: 0.7 % — HIGH (ref 0.1–0.3)
INR BLD: 2.59 RATIO — HIGH (ref 0.65–1.3)
KETONES UR-MCNC: NEGATIVE — SIGNIFICANT CHANGE UP
LACTATE BLDV-MCNC: 0.8 MMOL/L — SIGNIFICANT CHANGE UP (ref 0.5–1.6)
LEUKOCYTE ESTERASE UR-ACNC: NEGATIVE — SIGNIFICANT CHANGE UP
LYMPHOCYTES # BLD AUTO: 1.18 K/UL — LOW (ref 1.2–3.4)
LYMPHOCYTES # BLD AUTO: 14.3 % — LOW (ref 20.5–51.1)
MAGNESIUM SERPL-MCNC: 2.3 MG/DL — SIGNIFICANT CHANGE UP (ref 1.8–2.4)
MCHC RBC-ENTMCNC: 30.2 PG — SIGNIFICANT CHANGE UP (ref 27–31)
MCHC RBC-ENTMCNC: 32.9 G/DL — SIGNIFICANT CHANGE UP (ref 32–37)
MCV RBC AUTO: 92 FL — SIGNIFICANT CHANGE UP (ref 80–94)
MONOCYTES # BLD AUTO: 0.81 K/UL — HIGH (ref 0.1–0.6)
MONOCYTES NFR BLD AUTO: 9.8 % — HIGH (ref 1.7–9.3)
NEUTROPHILS # BLD AUTO: 6.13 K/UL — SIGNIFICANT CHANGE UP (ref 1.4–6.5)
NEUTROPHILS NFR BLD AUTO: 74.3 % — SIGNIFICANT CHANGE UP (ref 42.2–75.2)
NITRITE UR-MCNC: NEGATIVE — SIGNIFICANT CHANGE UP
NRBC # BLD: 0 /100 WBCS — SIGNIFICANT CHANGE UP (ref 0–0)
PCO2 BLDV: 56 MMHG — HIGH (ref 41–51)
PH BLDV: 7.34 — SIGNIFICANT CHANGE UP (ref 7.26–7.43)
PH UR: 7 — SIGNIFICANT CHANGE UP (ref 5–8)
PLATELET # BLD AUTO: 210 K/UL — SIGNIFICANT CHANGE UP (ref 130–400)
PO2 BLDV: 14 MMHG — LOW (ref 20–40)
POTASSIUM BLDV-SCNC: 4.6 MMOL/L — SIGNIFICANT CHANGE UP (ref 3.3–5.6)
POTASSIUM SERPL-MCNC: 5.5 MMOL/L — HIGH (ref 3.5–5)
POTASSIUM SERPL-SCNC: 5.5 MMOL/L — HIGH (ref 3.5–5)
PROT SERPL-MCNC: 6.7 G/DL — SIGNIFICANT CHANGE UP (ref 6–8)
PROT UR-MCNC: NEGATIVE MG/DL — SIGNIFICANT CHANGE UP
PROTHROM AB SERPL-ACNC: 29.5 SEC — HIGH (ref 9.95–12.87)
RBC # BLD: 4.6 M/UL — LOW (ref 4.7–6.1)
RBC # FLD: 16.3 % — HIGH (ref 11.5–14.5)
SAO2 % BLDV: 15 % — SIGNIFICANT CHANGE UP
SODIUM SERPL-SCNC: 137 MMOL/L — SIGNIFICANT CHANGE UP (ref 135–146)
SP GR SPEC: 1.02 — SIGNIFICANT CHANGE UP (ref 1.01–1.03)
TROPONIN T SERPL-MCNC: <0.01 NG/ML — SIGNIFICANT CHANGE UP
UROBILINOGEN FLD QL: 1 MG/DL (ref 0.2–0.2)
WBC # BLD: 8.25 K/UL — SIGNIFICANT CHANGE UP (ref 4.8–10.8)
WBC # FLD AUTO: 8.25 K/UL — SIGNIFICANT CHANGE UP (ref 4.8–10.8)

## 2019-05-29 PROCEDURE — 99285 EMERGENCY DEPT VISIT HI MDM: CPT

## 2019-05-29 PROCEDURE — 93010 ELECTROCARDIOGRAM REPORT: CPT

## 2019-05-29 PROCEDURE — 71046 X-RAY EXAM CHEST 2 VIEWS: CPT | Mod: 26

## 2019-05-29 NOTE — ED PROVIDER NOTE - PHYSICAL EXAMINATION
GENERAL:  well appearing, non-toxic male in no acute distress  SKIN: skin warm, pink and dry. MMM. no pallor, diaphoresis or rash.   HEAD: NC, AT  EYE: Normal lids, conjunctiva and sclera, PERRL, EOMI  ENT:  Airway intact. Patent oropharynx  NECK: Neck supple. No meningismus, or JVD. Trachea midline  PULM: CTAB. Normal respiratory effort. No respiratory distress. No wheezes, stridor, rales or rhonchi. No retractions  CV: RRR, no M/R/G.   ABD: Soft, non-tender, non-distended  MSK: Moving all extremities. No edema, erythema, cyanosis. radial pulses equal and intact bilaterally.   NEURO: A+Ox3, no sensory/motor deficits, CN II-XII intact. No speech slurring, pronator drift, facial asymmetry. Normal finger-to-nose  b/l. 5/5 strength throughout. Normal gait. Negative Romberg.  PSYCH: appropriate behavior, cooperative.

## 2019-05-29 NOTE — ED PROVIDER NOTE - ATTENDING CONTRIBUTION TO CARE
78 year old with PMH of MI in 1995, systolic CHF s/p AICD, COPD on PRN home oxygen, (rheumatic heart disease), bioprosthetic aortic valve, melanoma and squamous cell carcinoma s/p resection, admission april for AICD firing (new wire placed) here for weakness  pt endorsing 1 week nausea, lightheaded, weak  family checked BP and range  SBP. pcp d/c lasix but bp still low (86/58 at )  no cp, cough, ap, vomiting, diarrhea, fever, dysuria    vs borderline BP  gen- nontoxic appearing  card-rrr  lungs-ctab, no wheezing or rhonchi  abd-sntnd, no guarding or rebound  neuro- full str/sensation, cn ii-xii grossly intact, normal coordination    r/o anemia, lyte abnormality, r/o acs, r/o occult infection  possible med side effect 78 year old with PMH of MI in 1995, systolic CHF s/p AICD, COPD on PRN home oxygen, (rheumatic heart disease), bioprosthetic aortic valve, melanoma and squamous cell carcinoma s/p resection, admission april for AICD firing (new wire placed) here for weakness  pt endorsing 1 week nausea, lightheaded, weak  family checked BP and range  SBP. pcp d/c lasix but bp still low (86/58 at )  no cp, cough, ap, vomiting, diarrhea, fever, dysuria  pt endorses several medication changes since discharge which he believes is the culprit for his symptoms. symptoms seem to be worse after taking his medications and improve with time.     vs normotensive  gen- well appearing, aaox3, mentating well, energetic  card-rrr  lungs-ctab, no wheezing or rhonchi  abd-sntnd, no guarding or rebound  neuro- full str/sensation, cn ii-xii grossly intact, normal coordination and gait    r/o anemia, lyte abnormality, r/o acs, r/o occult infection  more likely med side effect  ed observation period  PO challenge

## 2019-05-29 NOTE — ED PROVIDER NOTE - CLINICAL SUMMARY MEDICAL DECISION MAKING FREE TEXT BOX
pt here for weakness, nausea, low BP 1 week after d/c from hospital w/ multiple medication changes. labs normal, no leukocytosis or lactate. cxr/ua negative. ekg w/o evidence of acute ischaemia. trop negative. clinically pt appears well, feeling baseline and without complaints. pt motivated to be discharged, family on board with plan. strict 1-2 day follow up stressed with patient and family. strict return precautions given.

## 2019-05-29 NOTE — ED PROVIDER NOTE - OBJECTIVE STATEMENT
77 yo male with h/o AICD, HTN, HLD, CAD, MI, CHF, AVR, A fib on coumadin sent to ED from  for hypotension. BP at  was 86/58. Patient c/o intermittent nausea and lightheadedness/dizziness x 1 week. Patient was recently admitted to hospital beginning of April for AICD firing (change AICD changed at that time), following dc patient had shingles, which now resolved. Patient wife has been keeping track of his blood pressures which have been ranging from 80s to low 100s systolic. Patient followed up with his pmd 2 weeks ago, Dr. Lee, who dced his water pill. Denies fever, chills, headache, chest pain, sob, abd pain, vomiting, diarrhea, urinary sxs, flank pain. No fall or trauma. no head injury.

## 2019-05-29 NOTE — ED PROVIDER NOTE - PROGRESS NOTE DETAILS
Patient feeling better, vitals stable. Results discussed with patient, copy given. Recommend follow up with pmd and cardiologist. Patient understands return precautions.

## 2019-05-29 NOTE — ED ADULT NURSE NOTE - OBJECTIVE STATEMENT
pt presents with hypotension and lightheadedness that started this morning. pt states he has hx of hypotension and has had recent change in medication. denies chest pain, sob. no signs of distress. denies fever

## 2019-05-29 NOTE — ED PROVIDER NOTE - CARE PROVIDER_API CALL
Mario Levin (MD)  Cardiovascular Disease; Internal Medicine; Interventional Cardiology  [address] [facility], [address]  [place] [address]  Phone: [phone]  Fax: [phone]  Follow Up Time:     Misha Chapman (DO)  Internal Medicine  [address] [place] [address]  Phone: [phone]  Fax: [phone]  Follow Up Time:

## 2019-05-29 NOTE — ED PROVIDER NOTE - NS ED ROS FT
Constitutional: no fever, chills or generalized weakness  Eyes: no visual changes  ENT: no URI sxs, no throat pain, no change in voice, no excessive drooling, no ear pain/discharge, no hearing changes.   Cardiovascular: no chest pain, no sob, no syncope , no palpitations, no peripheral edema  Respiratory: no cough, no shortness of breath  Gastrointestinal: + nausea. no vomiting or diarrhea. no abdominal pain.   :  no urinary sxs, no flank pain.  Musculoskeletal: no msk pain or injury. no fall or trauma.    Integumentary: + recent shingles, now resolved. no rash or skin changes. no edema  Neurological: +lightheadedness/dizziness. no headache, no visual changes, no UE/LE weakness or paresthesias. no change in mental status. no neck pain or stiffness.   Psychiatric: no suicidal or homicidal ideation or attempt. no hallucinations.   Allergic/Immunologic: no lymphadenopathy, no pruritus

## 2019-05-30 LAB
CULTURE RESULTS: NO GROWTH — SIGNIFICANT CHANGE UP
SPECIMEN SOURCE: SIGNIFICANT CHANGE UP

## 2019-06-06 ENCOUNTER — OUTPATIENT (OUTPATIENT)
Dept: OUTPATIENT SERVICES | Facility: HOSPITAL | Age: 79
LOS: 1 days | Discharge: HOME | End: 2019-06-06

## 2019-06-06 DIAGNOSIS — Z98.890 OTHER SPECIFIED POSTPROCEDURAL STATES: Chronic | ICD-10-CM

## 2019-06-06 DIAGNOSIS — Z79.01 LONG TERM (CURRENT) USE OF ANTICOAGULANTS: ICD-10-CM

## 2019-06-06 DIAGNOSIS — Z95.2 PRESENCE OF PROSTHETIC HEART VALVE: ICD-10-CM

## 2019-06-06 DIAGNOSIS — S68.119A COMPLETE TRAUMATIC METACARPOPHALANGEAL AMPUTATION OF UNSPECIFIED FINGER, INITIAL ENCOUNTER: Chronic | ICD-10-CM

## 2019-06-06 DIAGNOSIS — Z95.1 PRESENCE OF AORTOCORONARY BYPASS GRAFT: Chronic | ICD-10-CM

## 2019-06-06 DIAGNOSIS — K40.90 UNILATERAL INGUINAL HERNIA, WITHOUT OBSTRUCTION OR GANGRENE, NOT SPECIFIED AS RECURRENT: Chronic | ICD-10-CM

## 2019-06-06 LAB
POCT INR: 3 RATIO — HIGH (ref 0.9–1.2)
POCT PT: 36.1 SEC — HIGH (ref 10–13.4)

## 2019-06-17 ENCOUNTER — APPOINTMENT (OUTPATIENT)
Dept: CARDIOLOGY | Facility: CLINIC | Age: 79
End: 2019-06-17
Payer: COMMERCIAL

## 2019-06-17 PROCEDURE — 99213 OFFICE O/P EST LOW 20 MIN: CPT | Mod: 25

## 2019-06-17 PROCEDURE — 93284 PRGRMG EVAL IMPLANTABLE DFB: CPT | Mod: 59

## 2019-06-17 PROCEDURE — 93290 INTERROG DEV EVAL ICPMS IP: CPT | Mod: 59

## 2019-06-20 ENCOUNTER — APPOINTMENT (OUTPATIENT)
Dept: CARDIOLOGY | Facility: CLINIC | Age: 79
End: 2019-06-20
Payer: COMMERCIAL

## 2019-06-20 PROCEDURE — 99214 OFFICE O/P EST MOD 30 MIN: CPT

## 2019-06-20 PROCEDURE — 93000 ELECTROCARDIOGRAM COMPLETE: CPT

## 2019-06-21 ENCOUNTER — OUTPATIENT (OUTPATIENT)
Dept: OUTPATIENT SERVICES | Facility: HOSPITAL | Age: 79
LOS: 1 days | Discharge: HOME | End: 2019-06-21

## 2019-06-21 DIAGNOSIS — Z95.1 PRESENCE OF AORTOCORONARY BYPASS GRAFT: Chronic | ICD-10-CM

## 2019-06-21 DIAGNOSIS — K40.90 UNILATERAL INGUINAL HERNIA, WITHOUT OBSTRUCTION OR GANGRENE, NOT SPECIFIED AS RECURRENT: Chronic | ICD-10-CM

## 2019-06-21 DIAGNOSIS — S68.119A COMPLETE TRAUMATIC METACARPOPHALANGEAL AMPUTATION OF UNSPECIFIED FINGER, INITIAL ENCOUNTER: Chronic | ICD-10-CM

## 2019-06-21 DIAGNOSIS — Z98.890 OTHER SPECIFIED POSTPROCEDURAL STATES: Chronic | ICD-10-CM

## 2019-06-21 DIAGNOSIS — Z79.01 LONG TERM (CURRENT) USE OF ANTICOAGULANTS: ICD-10-CM

## 2019-06-21 DIAGNOSIS — Z95.2 PRESENCE OF PROSTHETIC HEART VALVE: ICD-10-CM

## 2019-06-21 LAB
POCT INR: 5.2 RATIO — CRITICAL HIGH (ref 0.9–1.2)
POCT PT: 63 SEC — HIGH (ref 10–13.4)

## 2019-06-26 ENCOUNTER — OUTPATIENT (OUTPATIENT)
Dept: OUTPATIENT SERVICES | Facility: HOSPITAL | Age: 79
LOS: 1 days | Discharge: HOME | End: 2019-06-26

## 2019-06-26 DIAGNOSIS — Z95.2 PRESENCE OF PROSTHETIC HEART VALVE: ICD-10-CM

## 2019-06-26 DIAGNOSIS — Z95.1 PRESENCE OF AORTOCORONARY BYPASS GRAFT: Chronic | ICD-10-CM

## 2019-06-26 DIAGNOSIS — Z98.890 OTHER SPECIFIED POSTPROCEDURAL STATES: Chronic | ICD-10-CM

## 2019-06-26 DIAGNOSIS — K40.90 UNILATERAL INGUINAL HERNIA, WITHOUT OBSTRUCTION OR GANGRENE, NOT SPECIFIED AS RECURRENT: Chronic | ICD-10-CM

## 2019-06-26 DIAGNOSIS — S68.119A COMPLETE TRAUMATIC METACARPOPHALANGEAL AMPUTATION OF UNSPECIFIED FINGER, INITIAL ENCOUNTER: Chronic | ICD-10-CM

## 2019-06-26 DIAGNOSIS — Z79.01 LONG TERM (CURRENT) USE OF ANTICOAGULANTS: ICD-10-CM

## 2019-06-26 LAB
POCT INR: 3.7 RATIO — HIGH (ref 0.9–1.2)
POCT PT: 44.9 SEC — HIGH (ref 10–13.4)

## 2019-06-28 ENCOUNTER — APPOINTMENT (OUTPATIENT)
Dept: SURGERY | Facility: CLINIC | Age: 79
End: 2019-06-28

## 2019-07-03 ENCOUNTER — OUTPATIENT (OUTPATIENT)
Dept: OUTPATIENT SERVICES | Facility: HOSPITAL | Age: 79
LOS: 1 days | Discharge: HOME | End: 2019-07-03

## 2019-07-03 ENCOUNTER — EMERGENCY (EMERGENCY)
Facility: HOSPITAL | Age: 79
LOS: 0 days | Discharge: HOME | End: 2019-07-03
Attending: EMERGENCY MEDICINE | Admitting: EMERGENCY MEDICINE
Payer: COMMERCIAL

## 2019-07-03 VITALS
DIASTOLIC BLOOD PRESSURE: 58 MMHG | TEMPERATURE: 98 F | SYSTOLIC BLOOD PRESSURE: 70 MMHG | OXYGEN SATURATION: 99 % | RESPIRATION RATE: 19 BRPM | HEART RATE: 68 BPM

## 2019-07-03 VITALS
SYSTOLIC BLOOD PRESSURE: 96 MMHG | OXYGEN SATURATION: 100 % | HEART RATE: 89 BPM | DIASTOLIC BLOOD PRESSURE: 57 MMHG | RESPIRATION RATE: 16 BRPM

## 2019-07-03 DIAGNOSIS — S68.119A COMPLETE TRAUMATIC METACARPOPHALANGEAL AMPUTATION OF UNSPECIFIED FINGER, INITIAL ENCOUNTER: Chronic | ICD-10-CM

## 2019-07-03 DIAGNOSIS — R42 DIZZINESS AND GIDDINESS: ICD-10-CM

## 2019-07-03 DIAGNOSIS — Z79.51 LONG TERM (CURRENT) USE OF INHALED STEROIDS: ICD-10-CM

## 2019-07-03 DIAGNOSIS — Z98.890 OTHER SPECIFIED POSTPROCEDURAL STATES: Chronic | ICD-10-CM

## 2019-07-03 DIAGNOSIS — K40.90 UNILATERAL INGUINAL HERNIA, WITHOUT OBSTRUCTION OR GANGRENE, NOT SPECIFIED AS RECURRENT: Chronic | ICD-10-CM

## 2019-07-03 DIAGNOSIS — J44.9 CHRONIC OBSTRUCTIVE PULMONARY DISEASE, UNSPECIFIED: ICD-10-CM

## 2019-07-03 DIAGNOSIS — Z79.1 LONG TERM (CURRENT) USE OF NON-STEROIDAL ANTI-INFLAMMATORIES (NSAID): ICD-10-CM

## 2019-07-03 DIAGNOSIS — Z95.1 PRESENCE OF AORTOCORONARY BYPASS GRAFT: Chronic | ICD-10-CM

## 2019-07-03 DIAGNOSIS — Z79.891 LONG TERM (CURRENT) USE OF OPIATE ANALGESIC: ICD-10-CM

## 2019-07-03 DIAGNOSIS — Z79.899 OTHER LONG TERM (CURRENT) DRUG THERAPY: ICD-10-CM

## 2019-07-03 DIAGNOSIS — I95.9 HYPOTENSION, UNSPECIFIED: ICD-10-CM

## 2019-07-03 DIAGNOSIS — Z79.01 LONG TERM (CURRENT) USE OF ANTICOAGULANTS: ICD-10-CM

## 2019-07-03 DIAGNOSIS — R79.1 ABNORMAL COAGULATION PROFILE: ICD-10-CM

## 2019-07-03 DIAGNOSIS — Z95.2 PRESENCE OF PROSTHETIC HEART VALVE: ICD-10-CM

## 2019-07-03 LAB
ALBUMIN SERPL ELPH-MCNC: 4 G/DL — SIGNIFICANT CHANGE UP (ref 3.5–5.2)
ALP SERPL-CCNC: 79 U/L — SIGNIFICANT CHANGE UP (ref 30–115)
ALT FLD-CCNC: 68 U/L — HIGH (ref 0–41)
ANION GAP SERPL CALC-SCNC: 11 MMOL/L — SIGNIFICANT CHANGE UP (ref 7–14)
AST SERPL-CCNC: 52 U/L — HIGH (ref 0–41)
BILIRUB DIRECT SERPL-MCNC: <0.2 MG/DL — SIGNIFICANT CHANGE UP (ref 0–0.2)
BILIRUB INDIRECT FLD-MCNC: >0.2 MG/DL — SIGNIFICANT CHANGE UP (ref 0.2–1.2)
BILIRUB SERPL-MCNC: 0.4 MG/DL — SIGNIFICANT CHANGE UP (ref 0.2–1.2)
BUN SERPL-MCNC: 28 MG/DL — HIGH (ref 10–20)
CALCIUM SERPL-MCNC: 9.6 MG/DL — SIGNIFICANT CHANGE UP (ref 8.5–10.1)
CHLORIDE SERPL-SCNC: 101 MMOL/L — SIGNIFICANT CHANGE UP (ref 98–110)
CO2 SERPL-SCNC: 29 MMOL/L — SIGNIFICANT CHANGE UP (ref 17–32)
CREAT SERPL-MCNC: 1.7 MG/DL — HIGH (ref 0.7–1.5)
GLUCOSE SERPL-MCNC: 93 MG/DL — SIGNIFICANT CHANGE UP (ref 70–99)
HCT VFR BLD CALC: 44.2 % — SIGNIFICANT CHANGE UP (ref 42–52)
HGB BLD-MCNC: 14.6 G/DL — SIGNIFICANT CHANGE UP (ref 14–18)
INR BLD: 4.47 RATIO — HIGH (ref 0.65–1.3)
MCHC RBC-ENTMCNC: 30.9 PG — SIGNIFICANT CHANGE UP (ref 27–31)
MCHC RBC-ENTMCNC: 33 G/DL — SIGNIFICANT CHANGE UP (ref 32–37)
MCV RBC AUTO: 93.4 FL — SIGNIFICANT CHANGE UP (ref 80–94)
NRBC # BLD: 0 /100 WBCS — SIGNIFICANT CHANGE UP (ref 0–0)
PLATELET # BLD AUTO: 164 K/UL — SIGNIFICANT CHANGE UP (ref 130–400)
POCT INR: 4.5 RATIO — HIGH (ref 0.9–1.2)
POCT PT: 54.5 SEC — HIGH (ref 10–13.4)
POTASSIUM SERPL-MCNC: 4.3 MMOL/L — SIGNIFICANT CHANGE UP (ref 3.5–5)
POTASSIUM SERPL-SCNC: 4.3 MMOL/L — SIGNIFICANT CHANGE UP (ref 3.5–5)
PROT SERPL-MCNC: 6.9 G/DL — SIGNIFICANT CHANGE UP (ref 6–8)
PROTHROM AB SERPL-ACNC: >40 SEC — HIGH (ref 9.95–12.87)
RBC # BLD: 4.73 M/UL — SIGNIFICANT CHANGE UP (ref 4.7–6.1)
RBC # FLD: 13.3 % — SIGNIFICANT CHANGE UP (ref 11.5–14.5)
SODIUM SERPL-SCNC: 141 MMOL/L — SIGNIFICANT CHANGE UP (ref 135–146)
TROPONIN T SERPL-MCNC: <0.01 NG/ML — SIGNIFICANT CHANGE UP
WBC # BLD: 6.64 K/UL — SIGNIFICANT CHANGE UP (ref 4.8–10.8)
WBC # FLD AUTO: 6.64 K/UL — SIGNIFICANT CHANGE UP (ref 4.8–10.8)

## 2019-07-03 PROCEDURE — 99292 CRITICAL CARE ADDL 30 MIN: CPT

## 2019-07-03 PROCEDURE — 71045 X-RAY EXAM CHEST 1 VIEW: CPT | Mod: 26

## 2019-07-03 PROCEDURE — 93010 ELECTROCARDIOGRAM REPORT: CPT

## 2019-07-03 PROCEDURE — 99291 CRITICAL CARE FIRST HOUR: CPT

## 2019-07-03 PROCEDURE — 93289 INTERROG DEVICE EVAL HEART: CPT | Mod: 26

## 2019-07-03 RX ORDER — SODIUM CHLORIDE 9 MG/ML
500 INJECTION INTRAMUSCULAR; INTRAVENOUS; SUBCUTANEOUS ONCE
Refills: 0 | Status: COMPLETED | OUTPATIENT
Start: 2019-07-03 | End: 2019-07-03

## 2019-07-03 RX ADMIN — SODIUM CHLORIDE 500 MILLILITER(S): 9 INJECTION INTRAMUSCULAR; INTRAVENOUS; SUBCUTANEOUS at 10:27

## 2019-07-03 RX ADMIN — SODIUM CHLORIDE 500 MILLILITER(S): 9 INJECTION INTRAMUSCULAR; INTRAVENOUS; SUBCUTANEOUS at 11:24

## 2019-07-03 NOTE — ED ADULT NURSE NOTE - OBJECTIVE STATEMENT
Patient is a 78 year old male presents to ED with complaints of dizziness, lightheadedness and hypotension; sent in from Coumadin Clinic for evaluation. Patient denies any syncopal episode, denies chest pain, SOB, recent fever or chills, nausea or vomiting. As per patient recently started on Mexiletine HCL 200mg and has been feeling dizziness since new medication.

## 2019-07-03 NOTE — ED ADULT NURSE REASSESSMENT NOTE - NS ED NURSE REASSESS COMMENT FT1
patient assessed, patient brought in for dizziness and hypotensive. 0.9% sodium chloride 500ml bolus given, labs sent. Patient placed on cardiac monitor, will continue to monitor

## 2019-07-03 NOTE — ED ADULT NURSE NOTE - CHPI ED NUR SYMPTOMS NEG
no decreased eating/drinking/no nausea/no fever/no tingling/no weakness/no pain/no chills/no vomiting

## 2019-07-03 NOTE — ED PROVIDER NOTE - CLINICAL SUMMARY MEDICAL DECISION MAKING FREE TEXT BOX
pt aware of all labs and imaging, seen by EP device interrogated and working, bp systolic 90's pt aware of plan for admission, possible medication adjustment, pt ambulatory in ed, tolerated po, reports no symptoms , no nausea, vomiting, LH, weakness, states asymptomatic, refusing admission, pt aoox3, competent, does not lack capacity, able to recite risks back, aware of signs and symptoms to return for, reports will follow up with pmd and cardio/EP, copy of results provided. pt aware of all labs and imaging, seen by EP device interrogated and working, bp systolic 90's pt aware of plan for admission, possible medication adjustment, pt ambulatory in ed, tolerated po, reports no symptoms , no nausea, vomiting, LH, weakness, states asymptomatic, refusing admission, pt aoox3, competent, does not lack capacity, able to recite risks back, aware of signs and symptoms to return for, reports will follow up with pmd and cardio/EP, copy of results provided.    The patient wishes to leave against medical advice.  I have discussed the risks, benefits and alternatives (including the possibility of worsening of disease, pain, permanent disability, and/or death) with the patient and his/her family (if available).  The patient voices understanding of these risks, benefits, and alternatives and still wishes to sign out against medical advice.  The patient is awake, alert, oriented  x 3 and has demonstrated capacity to refuse/direct care.  I have advised the patient that they can and should return immediately should they develop any worse/different/additional symptoms, or if they change their mind and want to continue their care.

## 2019-07-03 NOTE — ED ADULT NURSE NOTE - NSIMPLEMENTINTERV_GEN_ALL_ED
Implemented All Fall with Harm Risk Interventions:  [place] to call system. Call bell, personal items and telephone within reach. Instruct patient to call for assistance. Room bathroom lighting operational. Non-slip footwear when patient is off stretcher. Physically safe environment: no spills, clutter or unnecessary equipment. Stretcher in lowest position, wheels locked, appropriate side rails in place. Provide visual cue, wrist band, yellow gown, etc. Monitor gait and stability. Monitor for mental status changes and reorient to person, place, and time. Review medications for side effects contributing to fall risk. Reinforce activity limits and safety measures with patient and family. Provide visual clues: red socks.

## 2019-07-03 NOTE — ED PROVIDER NOTE - NSFOLLOWUPINSTRUCTIONS_ED_ALL_ED_FT
-Follow up with your Primary Care Provider in 1-3 days  -Return to ED for worsening symptoms or concerns.    Dizziness    Dizziness can manifest as a feeling of unsteadiness or light-headedness. You may feel like you are about to faint. This condition can be caused by a number of things, including medicines, dehydration, or illness. Drink enough fluid to keep your urine clear or pale yellow. Do not drink alcohol and limit your caffeine intake. Avoid quick or sudden movements.  Rise slowly from chairs and steady yourself until you feel okay. In the morning, first sit up on the side of the bed.    SEEK IMMEDIATE MEDICAL CARE IF YOU HAVE ANY OF THE FOLLOWING SYMPTOMS: vomiting, changes in your vision or speech, weakness in your arms or legs, trouble speaking or swallowing, chest pain, abdominal pain, shortness of breath, sweating, bleeding, headache, neck pain, or fever.

## 2019-07-03 NOTE — ED PROVIDER NOTE - CARE PLAN
Principal Discharge DX:	Hypotension  Secondary Diagnosis:	Dizziness Principal Discharge DX:	Hypotension  Secondary Diagnosis:	Supratherapeutic INR  Secondary Diagnosis:	Lightheadedness

## 2019-07-03 NOTE — ED PROVIDER NOTE - PHYSICAL EXAMINATION
CONST: Well appearing in NAD  EYES: PERRL, EOMI, Sclera and conjunctiva clear.   ENT: No nasal discharge.   NECK: Non-tender  CARD: Normal S1 S2; Normal rate and rhythm  RESP: Equal BS B/L, No wheezes, rhonchi or rales. No distress  GI: Soft, non-tender, non-distended.  MS: Normal ROM in all extremities. No midline spinal tenderness.  SKIN: Warm, dry, no acute rashes. Good turgor  NEURO: A&Ox3, No focal deficits. Strength 5/5 with no sensory deficits.

## 2019-07-03 NOTE — ED ADULT TRIAGE NOTE - CHIEF COMPLAINT QUOTE
Patient sent in from coumadin clinic this morning. BIBA for hypotention, dizziness and chest pain. States has been feeling dizzy since starting new medication Mexiletine 200 mg bid

## 2019-07-03 NOTE — ED PROVIDER NOTE - OBJECTIVE STATEMENT
79yo male with PMHx COPD, AVR, MVR, AICD/PPM recently upgraded in April to biventricular device, CABG, AFIB on coumadin, started on Mexiletine, presents sent from coumadin clinic for low BP. Systolic in 60s. Patient reports he has been feeling intermittently dizzy 77yo male with PMHx COPD, AVR, MVR, AICD/PPM recently upgraded in April to biventricular device, CABG, AFIB on coumadin, started on Mexiletine, presents sent from coumadin clinic for low BP. Systolic in 60s. Patient reports he has been feeling intermittently dizzy since started on Mexiletine mths prior. He states several days ago he became lightheaded/dizzy, lost his balance and grazed R hand on chair trying to stop himself from falling. No head injury. Patient also denies blood in stool or vomitus. No abdominal pain.

## 2019-07-03 NOTE — ED PROVIDER NOTE - NS ED ROS FT
CONST: No fever, chills or bodyaches  CARD: No chest pain, palpitations  RESP: No SOB, cough  GI: No abdominal pain  MS: No joint pain, back pain or extremity pain/injury  SKIN: No rashes  NEURO: (+) dizziness/lightheadedness

## 2019-07-03 NOTE — ED PROVIDER NOTE - PROGRESS NOTE DETAILS
Discussed with EP for interrogation. ATTENDING NOTE:   79 y/o M with PMH AICD, 2 valve replacements (1 PIG and 1 mechanical), on waterpill, HTN, afib, on Coumadin last INR was today and was 4.5, on Soma for stomach spasms, presents for eval of hypotension. Pt was at the Coumadin clinic when BP was measured to be 60 so was sent to ED. Notes feeling light headed and nausea today but reports that it could be due to the side effects of Mexiletine which pt has had these SX intermittently since Dr. Daily started pt on the medication 2 months ago.  Endorses he was dry heaving this morning due to nausea. Pt reports that he was able to maintain a low BP to 80s but has HTN, BP was never this low.  Pt felt light headed and fell on to a chair Sunday and grazed his hand, denies head trauma or pain anywhere. Denies fever, chills, v, cp, sob, pleuritic cp, palpitations, diaphoresis, cough, tinnitus, ear pain, hearing loss, neck pain/stiffness, back pain, photophobia/phonophobia, blurry vision/visual changes, abd pain, diarrhea, constipation, melena/brbpr, urinary symptoms, weakness, numbness/tingling, HA, syncope, sick contacts, recent travel or rash. ATTENDING NOTE: Part 2  on exam:  Constitutional: wdwn male sitting on stretcher in nad. Skin: no rash, no signs of trauma: HEENT: PERRL, EOMI, No nystagmus. MMM. NECK and BACK: neck supple, no spinous ttp to neck or back, FROM, no palpable shelves or step offs, no meningeal signs. CARDIO: regular rate, radial pulses 2/4 b/l, dp and pt pulses 2/4 b/l. Lungs: Ctabl w/ breath sounds present b/l, no wheezing or crackles, good air exchange, good respiratory effort, no accessory muscle use, no tachypnea, no stridor. ABD: BS present throughout all 4 quadrants, abd soft, NTND  no rebound or guarding, no cvat,; EXT: FROM of UE and LE, no drift, no calf pain/swelling/erythema. NEURO: AAOx3. Motor 5/5 and sensation intact throughout UE and LE. CN II-XII intact. No facial droop or slurring of speech. (-) Pronator (-) Romberg, no dysmetria w/ ftn or rapid alternating fine movements, heel to shin intact, ambulating with no ataxia or difficulty. NIH O. A/P: Will get labs, CXR, EKG, consult EP and reassess. in may 2018 bun/cre 24/1.4, today 28/1.7 Bedside sono shows no pericardial effusion. EP NP at bedside interrogation negative for arrythmia as per EP NP

## 2019-07-08 ENCOUNTER — EMERGENCY (EMERGENCY)
Facility: HOSPITAL | Age: 79
LOS: 0 days | Discharge: LEFT BEFORE DISCHARGE | End: 2019-07-08
Admitting: EMERGENCY MEDICINE
Payer: COMMERCIAL

## 2019-07-08 ENCOUNTER — OUTPATIENT (OUTPATIENT)
Dept: OUTPATIENT SERVICES | Facility: HOSPITAL | Age: 79
LOS: 1 days | Discharge: HOME | End: 2019-07-08

## 2019-07-08 VITALS
SYSTOLIC BLOOD PRESSURE: 102 MMHG | DIASTOLIC BLOOD PRESSURE: 61 MMHG | HEART RATE: 90 BPM | TEMPERATURE: 98 F | OXYGEN SATURATION: 98 % | RESPIRATION RATE: 18 BRPM

## 2019-07-08 DIAGNOSIS — Z95.1 PRESENCE OF AORTOCORONARY BYPASS GRAFT: Chronic | ICD-10-CM

## 2019-07-08 DIAGNOSIS — R07.9 CHEST PAIN, UNSPECIFIED: ICD-10-CM

## 2019-07-08 DIAGNOSIS — S68.119A COMPLETE TRAUMATIC METACARPOPHALANGEAL AMPUTATION OF UNSPECIFIED FINGER, INITIAL ENCOUNTER: Chronic | ICD-10-CM

## 2019-07-08 DIAGNOSIS — Z79.01 LONG TERM (CURRENT) USE OF ANTICOAGULANTS: ICD-10-CM

## 2019-07-08 DIAGNOSIS — Z95.2 PRESENCE OF PROSTHETIC HEART VALVE: ICD-10-CM

## 2019-07-08 DIAGNOSIS — K40.90 UNILATERAL INGUINAL HERNIA, WITHOUT OBSTRUCTION OR GANGRENE, NOT SPECIFIED AS RECURRENT: Chronic | ICD-10-CM

## 2019-07-08 DIAGNOSIS — Z98.890 OTHER SPECIFIED POSTPROCEDURAL STATES: Chronic | ICD-10-CM

## 2019-07-08 DIAGNOSIS — Z79.899 OTHER LONG TERM (CURRENT) DRUG THERAPY: ICD-10-CM

## 2019-07-08 LAB
POCT INR: 3.5 RATIO — HIGH (ref 0.9–1.2)
POCT PT: 41.6 SEC — HIGH (ref 10–13.4)

## 2019-07-08 PROCEDURE — 93010 ELECTROCARDIOGRAM REPORT: CPT

## 2019-07-08 NOTE — ED ADULT NURSE NOTE - EXPLANATION OF PATIENT'S REASON FOR LEAVING
states he was just here last Thursday and doesn't want to be poked with needles (EKG in triage completed)

## 2019-07-09 ENCOUNTER — APPOINTMENT (OUTPATIENT)
Dept: PLASTIC SURGERY | Facility: CLINIC | Age: 79
End: 2019-07-09

## 2019-07-15 ENCOUNTER — OUTPATIENT (OUTPATIENT)
Dept: OUTPATIENT SERVICES | Facility: HOSPITAL | Age: 79
LOS: 1 days | Discharge: HOME | End: 2019-07-15

## 2019-07-15 DIAGNOSIS — K40.90 UNILATERAL INGUINAL HERNIA, WITHOUT OBSTRUCTION OR GANGRENE, NOT SPECIFIED AS RECURRENT: Chronic | ICD-10-CM

## 2019-07-15 DIAGNOSIS — S68.119A COMPLETE TRAUMATIC METACARPOPHALANGEAL AMPUTATION OF UNSPECIFIED FINGER, INITIAL ENCOUNTER: Chronic | ICD-10-CM

## 2019-07-15 DIAGNOSIS — Z95.1 PRESENCE OF AORTOCORONARY BYPASS GRAFT: Chronic | ICD-10-CM

## 2019-07-15 DIAGNOSIS — Z98.890 OTHER SPECIFIED POSTPROCEDURAL STATES: Chronic | ICD-10-CM

## 2019-07-15 DIAGNOSIS — Z95.2 PRESENCE OF PROSTHETIC HEART VALVE: ICD-10-CM

## 2019-07-15 DIAGNOSIS — Z79.01 LONG TERM (CURRENT) USE OF ANTICOAGULANTS: ICD-10-CM

## 2019-07-20 NOTE — ED ADULT NURSE NOTE - NEURO WDL
Alert and oriented to person, place and time, memory intact, behavior appropriate to situation, PERRL. Slight

## 2019-07-22 ENCOUNTER — INPATIENT (INPATIENT)
Facility: HOSPITAL | Age: 79
LOS: 0 days | Discharge: HOME | End: 2019-07-23
Attending: INTERNAL MEDICINE | Admitting: INTERNAL MEDICINE
Payer: COMMERCIAL

## 2019-07-22 VITALS
SYSTOLIC BLOOD PRESSURE: 100 MMHG | RESPIRATION RATE: 16 BRPM | WEIGHT: 139.99 LBS | HEART RATE: 97 BPM | HEIGHT: 65 IN | DIASTOLIC BLOOD PRESSURE: 73 MMHG | TEMPERATURE: 98 F

## 2019-07-22 DIAGNOSIS — S68.119A COMPLETE TRAUMATIC METACARPOPHALANGEAL AMPUTATION OF UNSPECIFIED FINGER, INITIAL ENCOUNTER: Chronic | ICD-10-CM

## 2019-07-22 DIAGNOSIS — Z98.890 OTHER SPECIFIED POSTPROCEDURAL STATES: Chronic | ICD-10-CM

## 2019-07-22 DIAGNOSIS — K40.90 UNILATERAL INGUINAL HERNIA, WITHOUT OBSTRUCTION OR GANGRENE, NOT SPECIFIED AS RECURRENT: Chronic | ICD-10-CM

## 2019-07-22 DIAGNOSIS — Z95.1 PRESENCE OF AORTOCORONARY BYPASS GRAFT: Chronic | ICD-10-CM

## 2019-07-22 LAB
ALBUMIN SERPL ELPH-MCNC: 4 G/DL — SIGNIFICANT CHANGE UP (ref 3.5–5.2)
ALP SERPL-CCNC: 63 U/L — SIGNIFICANT CHANGE UP (ref 30–115)
ALT FLD-CCNC: 49 U/L — HIGH (ref 0–41)
ANION GAP SERPL CALC-SCNC: 12 MMOL/L — SIGNIFICANT CHANGE UP (ref 7–14)
AST SERPL-CCNC: 62 U/L — HIGH (ref 0–41)
BASOPHILS # BLD AUTO: 0.04 K/UL — SIGNIFICANT CHANGE UP (ref 0–0.2)
BASOPHILS NFR BLD AUTO: 0.5 % — SIGNIFICANT CHANGE UP (ref 0–1)
BILIRUB SERPL-MCNC: 0.3 MG/DL — SIGNIFICANT CHANGE UP (ref 0.2–1.2)
BUN SERPL-MCNC: 24 MG/DL — HIGH (ref 10–20)
CALCIUM SERPL-MCNC: 9.6 MG/DL — SIGNIFICANT CHANGE UP (ref 8.5–10.1)
CHLORIDE SERPL-SCNC: 99 MMOL/L — SIGNIFICANT CHANGE UP (ref 98–110)
CK MB CFR SERPL CALC: 1.3 NG/ML — SIGNIFICANT CHANGE UP (ref 0.6–6.3)
CK SERPL-CCNC: 36 U/L — SIGNIFICANT CHANGE UP (ref 0–225)
CO2 SERPL-SCNC: 29 MMOL/L — SIGNIFICANT CHANGE UP (ref 17–32)
CREAT SERPL-MCNC: 1.5 MG/DL — SIGNIFICANT CHANGE UP (ref 0.7–1.5)
EOSINOPHIL # BLD AUTO: 0.06 K/UL — SIGNIFICANT CHANGE UP (ref 0–0.7)
EOSINOPHIL NFR BLD AUTO: 0.7 % — SIGNIFICANT CHANGE UP (ref 0–8)
GLUCOSE SERPL-MCNC: 94 MG/DL — SIGNIFICANT CHANGE UP (ref 70–99)
HCT VFR BLD CALC: 47 % — SIGNIFICANT CHANGE UP (ref 42–52)
HGB BLD-MCNC: 15.2 G/DL — SIGNIFICANT CHANGE UP (ref 14–18)
IMM GRANULOCYTES NFR BLD AUTO: 0.2 % — SIGNIFICANT CHANGE UP (ref 0.1–0.3)
LYMPHOCYTES # BLD AUTO: 1.73 K/UL — SIGNIFICANT CHANGE UP (ref 1.2–3.4)
LYMPHOCYTES # BLD AUTO: 21.4 % — SIGNIFICANT CHANGE UP (ref 20.5–51.1)
MAGNESIUM SERPL-MCNC: 2.3 MG/DL — SIGNIFICANT CHANGE UP (ref 1.8–2.4)
MCHC RBC-ENTMCNC: 30.2 PG — SIGNIFICANT CHANGE UP (ref 27–31)
MCHC RBC-ENTMCNC: 32.3 G/DL — SIGNIFICANT CHANGE UP (ref 32–37)
MCV RBC AUTO: 93.4 FL — SIGNIFICANT CHANGE UP (ref 80–94)
MONOCYTES # BLD AUTO: 0.89 K/UL — HIGH (ref 0.1–0.6)
MONOCYTES NFR BLD AUTO: 11 % — HIGH (ref 1.7–9.3)
NEUTROPHILS # BLD AUTO: 5.36 K/UL — SIGNIFICANT CHANGE UP (ref 1.4–6.5)
NEUTROPHILS NFR BLD AUTO: 66.2 % — SIGNIFICANT CHANGE UP (ref 42.2–75.2)
NRBC # BLD: 0 /100 WBCS — SIGNIFICANT CHANGE UP (ref 0–0)
NT-PROBNP SERPL-SCNC: 3080 PG/ML — HIGH (ref 0–300)
PLATELET # BLD AUTO: 180 K/UL — SIGNIFICANT CHANGE UP (ref 130–400)
POTASSIUM SERPL-MCNC: 5.8 MMOL/L — HIGH (ref 3.5–5)
POTASSIUM SERPL-SCNC: 5.8 MMOL/L — HIGH (ref 3.5–5)
PROT SERPL-MCNC: 7.6 G/DL — SIGNIFICANT CHANGE UP (ref 6–8)
RBC # BLD: 5.03 M/UL — SIGNIFICANT CHANGE UP (ref 4.7–6.1)
RBC # FLD: 13.2 % — SIGNIFICANT CHANGE UP (ref 11.5–14.5)
SODIUM SERPL-SCNC: 140 MMOL/L — SIGNIFICANT CHANGE UP (ref 135–146)
TROPONIN T SERPL-MCNC: <0.01 NG/ML — SIGNIFICANT CHANGE UP
TROPONIN T SERPL-MCNC: <0.01 NG/ML — SIGNIFICANT CHANGE UP
WBC # BLD: 8.1 K/UL — SIGNIFICANT CHANGE UP (ref 4.8–10.8)
WBC # FLD AUTO: 8.1 K/UL — SIGNIFICANT CHANGE UP (ref 4.8–10.8)

## 2019-07-22 PROCEDURE — 93010 ELECTROCARDIOGRAM REPORT: CPT

## 2019-07-22 PROCEDURE — 99285 EMERGENCY DEPT VISIT HI MDM: CPT

## 2019-07-22 PROCEDURE — 93289 INTERROG DEVICE EVAL HEART: CPT | Mod: 26

## 2019-07-22 PROCEDURE — 71045 X-RAY EXAM CHEST 1 VIEW: CPT | Mod: 26

## 2019-07-22 RX ORDER — METOPROLOL TARTRATE 50 MG
12.5 TABLET ORAL
Refills: 0 | Status: DISCONTINUED | OUTPATIENT
Start: 2019-07-22 | End: 2019-07-23

## 2019-07-22 RX ORDER — WARFARIN SODIUM 2.5 MG/1
2.5 TABLET ORAL ONCE
Refills: 0 | Status: DISCONTINUED | OUTPATIENT
Start: 2019-07-22 | End: 2019-07-23

## 2019-07-22 RX ORDER — SENNA PLUS 8.6 MG/1
2 TABLET ORAL AT BEDTIME
Refills: 0 | Status: DISCONTINUED | OUTPATIENT
Start: 2019-07-22 | End: 2019-07-23

## 2019-07-22 RX ORDER — WARFARIN SODIUM 2.5 MG/1
2.5 TABLET ORAL AT BEDTIME
Refills: 0 | Status: DISCONTINUED | OUTPATIENT
Start: 2019-07-22 | End: 2019-07-22

## 2019-07-22 RX ORDER — ATORVASTATIN CALCIUM 80 MG/1
40 TABLET, FILM COATED ORAL AT BEDTIME
Refills: 0 | Status: DISCONTINUED | OUTPATIENT
Start: 2019-07-22 | End: 2019-07-23

## 2019-07-22 RX ORDER — SPIRONOLACTONE 25 MG/1
12.5 TABLET, FILM COATED ORAL DAILY
Refills: 0 | Status: DISCONTINUED | OUTPATIENT
Start: 2019-07-22 | End: 2019-07-22

## 2019-07-22 RX ORDER — ALPRAZOLAM 0.25 MG
0.25 TABLET ORAL DAILY
Refills: 0 | Status: DISCONTINUED | OUTPATIENT
Start: 2019-07-22 | End: 2019-07-23

## 2019-07-22 RX ORDER — AMIODARONE HYDROCHLORIDE 400 MG/1
300 TABLET ORAL DAILY
Refills: 0 | Status: DISCONTINUED | OUTPATIENT
Start: 2019-07-22 | End: 2019-07-23

## 2019-07-22 RX ORDER — DOCUSATE SODIUM 100 MG
100 CAPSULE ORAL THREE TIMES A DAY
Refills: 0 | Status: DISCONTINUED | OUTPATIENT
Start: 2019-07-22 | End: 2019-07-23

## 2019-07-22 RX ORDER — PANTOPRAZOLE SODIUM 20 MG/1
40 TABLET, DELAYED RELEASE ORAL
Refills: 0 | Status: DISCONTINUED | OUTPATIENT
Start: 2019-07-22 | End: 2019-07-23

## 2019-07-22 RX ORDER — FUROSEMIDE 40 MG
40 TABLET ORAL DAILY
Refills: 0 | Status: DISCONTINUED | OUTPATIENT
Start: 2019-07-22 | End: 2019-07-23

## 2019-07-22 RX ORDER — SPIRONOLACTONE 25 MG/1
12.5 TABLET, FILM COATED ORAL
Qty: 0 | Refills: 0 | DISCHARGE

## 2019-07-22 RX ORDER — MAGNESIUM OXIDE 400 MG ORAL TABLET 241.3 MG
500 TABLET ORAL DAILY
Refills: 0 | Status: DISCONTINUED | OUTPATIENT
Start: 2019-07-22 | End: 2019-07-23

## 2019-07-22 RX ORDER — TIOTROPIUM BROMIDE 18 UG/1
1 CAPSULE ORAL; RESPIRATORY (INHALATION) DAILY
Refills: 0 | Status: DISCONTINUED | OUTPATIENT
Start: 2019-07-22 | End: 2019-07-23

## 2019-07-22 RX ADMIN — SENNA PLUS 2 TABLET(S): 8.6 TABLET ORAL at 22:28

## 2019-07-22 RX ADMIN — ATORVASTATIN CALCIUM 40 MILLIGRAM(S): 80 TABLET, FILM COATED ORAL at 22:28

## 2019-07-22 RX ADMIN — Medication 100 MILLIGRAM(S): at 22:28

## 2019-07-22 NOTE — H&P ADULT - NSHPLABSRESULTS_GEN_ALL_CORE
===================== LABS =====================                        15.2   8.10  )-----------( 180      ( 22 Jul 2019 14:38 )             47.0     07-22    140  |  99  |  24<H>  ----------------------------<  94  5.8<H>   |  29  |  1.5    Ca    9.6      22 Jul 2019 14:38  Mg     2.3     07-22    TPro  7.6  /  Alb  4.0  /  TBili  0.3  /  DBili  x   /  AST  62<H>  /  ALT  49<H>  /  AlkPhos  63  07-22          Troponin T, Serum: <0.01 ng/mL (07-22-19 @ 14:38)    CARDIAC MARKERS ( 22 Jul 2019 14:38 )  x     / <0.01 ng/mL / x     / x     / x          ================== EKG ==================  < from: 12 Lead ECG (07.22.19 @ 15:54) >    Diagnosis Line AV dual-paced rhythm with frequent ventricular-paced complexes and with  occasional Premature ventricular complexes  Abnormal ECG

## 2019-07-22 NOTE — ED ADULT NURSE NOTE - NSIMPLEMENTINTERV_GEN_ALL_ED
Implemented All Fall with Harm Risk Interventions:  [place] to call system. Call bell, personal items and telephone within reach. Instruct patient to call for assistance. Room bathroom lighting operational. Non-slip footwear when patient is off stretcher. Physically safe environment: no spills, clutter or unnecessary equipment. Stretcher in lowest position, wheels locked, appropriate side rails in place. Provide visual cue, wrist band, yellow gown, etc. Monitor gait and stability. Monitor for mental status changes and reorient to person, place, and time. Review medications for side effects contributing to fall risk. Reinforce activity limits and safety measures with patient and family. Provide visual clues: red socks.

## 2019-07-22 NOTE — H&P ADULT - ASSESSMENT
================= ASSESSMENT/PLAN ==================  Patient is a 78y old Male who presents with a chief complaint of Chest pressure and lightheadedness (22 Jul 2019 19:02)  Currently admitted to medicine with the primary diagnosis of Pacemaker malfunction    # Lightheadedness  Could be related to vasovagal (while shaving) vs orthostatic episode (while standing up)   Have to rule out arrythmia given history of recurrent v tach   Recent nuclear stress test, old scar on the inferior wall due to previous MI but no new ischemia (4/2019)  - Monitor in tele   - AICD interrogation by EP: per the patient but no changes done. To follow up in am   - Check orthostatics     # Chest pressure   Unlikely cardiac however given history rule out ACS as improves with maalox and tea and burping  - Cardiac enzymes negative x1. Repeat X2  - EKG was grossly unchanged from previous ones. Repeat in AM   - Will start maalox and PPI    # LLE numbness  For the past 5 months, and while patient does not endorse claudication, does will limb is colder and does endorse weakness on ambulation.   Pulses present, no neurological deficits   - Arterial duplex ordered     # chronic HFrEF/ CAD - last ECHO in 3/19 with EF of < 20%  BNP 3000 however patient euvolemic   - Per the patient and wife he was recently switched to lasix 40 once daily. (Please confirm this in AM with cards or EP)   - Continue b blocker (also recently changed to 12.5 twice daily), spironolactone and lasix   - Consider adding ace/arb   - c/w statin     # COPD on home O2 - stable   - Continue spiriva   - Avoid beta agonists     # History of rheumatic heart disease s/p mechanical Mitral valve and bioprosthetic aortic valve.  - On coumadin to target INR of 2.5-3.5     # Thoracic aortic aneurism  - CT angio in (2/2019) showed Ascending thoracic aortic aneurysm measuring up to 4.8 cm and descending thoracic aortic aneurysm measuring 3.7 cm, stable.  - Outpatient follow up imaging for stability every 6 months (next one should be done next months as 4.5 to 5.4 cm)  - Control blood pressure   - Avoid fluoroquinolones if antibiotics needed   - c/w statin     # Incidental right renal lesion found on CT scan; Indeterminate right interpolar 1.1 cm renal lesion.   - Out patient follow-up renal mass protocol CT or MRI is recommended in 3-6 months.      GI PPX: Pantoprazole   DVT PPX: On coumadin     DIET:  ACTIVITY:  () Ad Margarita  /  (X) Advance as Tolerated  /  () Bed Rest  /  () Fall Precaution  /  () Seizure precaution    ================= PRESENT TODAY ==================    1-Woodard Catheter: No  Indication:  2-Vascular Access:  [X]Peripheral | Indication:  []Midline | Indication:   []PICC Line | Indication:  []CVC | Indication:  []Arterial Line | Indication:  []Uldall Catheter | Indication:   3-IV Fluids: No | Indication:  4-Ventilation: Room air | Sat:     ================= DISPOSITION ==================    Patient to be discharged when condition(s) optimized.            Discharge to: Home when stable             Home services:              Counseled on/Discussed cessation of:  () Risky behaviors  /  () Smoking cessation  /  () Diet  /  () Exercise  /  () Other    ================= CODE STATUS =================                  (X) FULL CODE     |     () DNR     |     () DNI    () Discussion with patient and/or family regarding goals of care ================= ASSESSMENT/PLAN ==================  Patient is a 78y old Male who presents with a chief complaint of Chest pressure and lightheadedness (22 Jul 2019 19:02)  Currently admitted to medicine with the primary diagnosis of Pacemaker malfunction    # Lightheadedness  Could be related to vasovagal (while shaving) vs orthostatic episode (while standing up)   Have to rule out arrythmia given history of recurrent v tach   Recent nuclear stress test, old scar on the inferior wall due to previous MI but no new ischemia (4/2019)  - Monitor in tele   - AICD interrogation by EP: per the patient but no changes done. To follow up in am   - Check orthostatics     # Chest pressure   Unlikely cardiac however given history rule out ACS as improves with maalox and tea and burping  - Cardiac enzymes negative x1. Repeat X2  - EKG was grossly unchanged from previous ones. Repeat in AM   - Will start maalox and PPI    # LLE numbness  For the past 5 months, and while patient does not endorse claudication, does will limb is colder and does endorse weakness on ambulation.   Pulses present, no neurological deficits   - Arterial duplex ordered     # chronic HFrEF/ CAD - last ECHO in 3/19 with EF of < 20%  BNP 3000 however patient euvolemic   - Per the patient and wife he was recently switched to lasix 40 once daily. (Please confirm this in AM with cards or EP)   - Continue b blocker (also recently changed to 12.5 twice daily), spironolactone and lasix   - Consider adding ace/arb   - c/w statin     # CKD 3b - Creatinine stable 1.3-1.5 in past months   Non oliguric   - Continue Lasix   - Monitor BMP     # COPD on home O2 - stable   - Continue spiriva   - Avoid beta agonists     # History of rheumatic heart disease s/p mechanical Mitral valve and bioprosthetic aortic valve.  - On coumadin to target INR of 2.5-3.5     # Thoracic aortic aneurism  - CT angio in (2/2019) showed Ascending thoracic aortic aneurysm measuring up to 4.8 cm and descending thoracic aortic aneurysm measuring 3.7 cm, stable.  - Outpatient follow up imaging for stability every 6 months (next one should be done next months as 4.5 to 5.4 cm)  - Control blood pressure   - Avoid fluoroquinolones if antibiotics needed   - c/w statin     # Incidental right renal lesion found on CT scan; Indeterminate right interpolar 1.1 cm renal lesion.   - Out patient follow-up renal mass protocol CT or MRI is recommended in 3-6 months.      GI PPX: Pantoprazole   DVT PPX: On coumadin     DIET:  ACTIVITY:  () Ad Margarita  /  (X) Advance as Tolerated  /  () Bed Rest  /  () Fall Precaution  /  () Seizure precaution    ================= PRESENT TODAY ==================    1-Woodard Catheter: No  Indication:  2-Vascular Access:  [X]Peripheral | Indication:  []Midline | Indication:   []PICC Line | Indication:  []CVC | Indication:  []Arterial Line | Indication:  []Uldall Catheter | Indication:   3-IV Fluids: No | Indication:  4-Ventilation: Room air | Sat:     ================= DISPOSITION ==================    Patient to be discharged when condition(s) optimized.            Discharge to: Home when stable             Home services:              Counseled on/Discussed cessation of:  () Risky behaviors  /  () Smoking cessation  /  () Diet  /  () Exercise  /  () Other    ================= CODE STATUS =================                  (X) FULL CODE     |     () DNR     |     () DNI    () Discussion with patient and/or family regarding goals of care ================= ASSESSMENT/PLAN ==================  Patient is a 78y old Male who presents with a chief complaint of Chest pressure and lightheadedness (22 Jul 2019 19:02)  Currently admitted to medicine with the primary diagnosis of Pacemaker malfunction    # Lightheadedness  Could be related to vasovagal (while shaving) vs orthostatic episode (while standing up)   Have to rule out arrythmia given history of recurrent v tach   Recent nuclear stress test, old scar on the inferior wall due to previous MI but no new ischemia (4/2019)  - Monitor in tele   - AICD interrogation by EP: per the patient but no changes done. To follow up in am   - Check orthostatics     # Chest pressure   Unlikely cardiac however given history rule out ACS as improves with maalox and tea and burping  - Cardiac enzymes negative x1. Repeat X2  - EKG was grossly unchanged from previous ones. Repeat in AM   - Will start maalox and PPI    # LLE numbness  For the past 5 months, and while patient does not endorse claudication, does will limb is colder and does endorse weakness on ambulation.   Pulses present, no neurological deficits   - Arterial duplex ordered     # chronic HFrEF/ CAD - last ECHO in 3/19 with EF of < 20%  BNP 3000 however patient euvolemic   - Per the patient and wife he was recently switched to lasix 40 once daily. (Please confirm this in AM with cards or EP)   - Continue b blocker (also recently changed to 12.5 twice daily), spironolactone and lasix   - Consider adding ace/arb   - c/w statin     # CKD 3b - Creatinine stable 1.3-1.5 in past months   Non oliguric   - Continue Lasix   - Monitor BMP     # COPD on home O2 - stable   - Continue spiriva   - Avoid beta agonists     # History of rheumatic heart disease s/p mechanical Mitral valve and bioprosthetic aortic valve.  - On coumadin to target INR of 2.5-3.5    - Coumadin 2.5 mg on Monday and 3.75 all other days     # Thoracic aortic aneurism  - CT angio in (2/2019) showed Ascending thoracic aortic aneurysm measuring up to 4.8 cm and descending thoracic aortic aneurysm measuring 3.7 cm, stable.  - Outpatient follow up imaging for stability every 6 months (next one should be done next months as 4.5 to 5.4 cm)  - Control blood pressure   - Avoid fluoroquinolones if antibiotics needed   - c/w statin     # Incidental right renal lesion found on CT scan; Indeterminate right interpolar 1.1 cm renal lesion.   - Out patient follow-up renal mass protocol CT or MRI is recommended in 3-6 months.      GI PPX: Pantoprazole   DVT PPX: On coumadin     DIET:  ACTIVITY:  () Ad Margarita  /  (X) Advance as Tolerated  /  () Bed Rest  /  () Fall Precaution  /  () Seizure precaution    ================= PRESENT TODAY ==================    1-Woodard Catheter: No  Indication:  2-Vascular Access:  [X]Peripheral | Indication:  []Midline | Indication:   []PICC Line | Indication:  []CVC | Indication:  []Arterial Line | Indication:  []Uldall Catheter | Indication:   3-IV Fluids: No | Indication:  4-Ventilation: Room air | Sat:     ================= DISPOSITION ==================    Patient to be discharged when condition(s) optimized.            Discharge to: Home when stable             Home services:              Counseled on/Discussed cessation of:  () Risky behaviors  /  () Smoking cessation  /  () Diet  /  () Exercise  /  () Other    ================= CODE STATUS =================                  (X) FULL CODE     |     () DNR     |     () DNI    () Discussion with patient and/or family regarding goals of care ================= ASSESSMENT/PLAN ==================  Patient is a 78y old Male who presents with a chief complaint of Chest pressure and lightheadedness (22 Jul 2019 19:02)  Currently admitted to medicine with the primary diagnosis of Pacemaker malfunction    # Lightheadedness  Could be related to vasovagal (while shaving) vs orthostatic episode (while standing up)   Have to rule out arrythmia given history of recurrent v tach   Recent nuclear stress test, old scar on the inferior wall due to previous MI but no new ischemia (4/2019)  - Monitor in tele   - AICD interrogation by EP: per the patient but no changes done. To follow up in am   - Check orthostatics     # Chest pressure   Unlikely cardiac however given history rule out ACS as improves with maalox and tea and burping  - Cardiac enzymes negative x1. Repeat X2  - EKG was grossly unchanged from previous ones. Repeat in AM   - Will start maalox and PPI    # LLE numbness  For the past 5 months, and while patient does not endorse claudication, does will limb is colder and does endorse weakness on ambulation.   Pulses present, no neurological deficits   - Arterial duplex ordered     # chronic HFrEF/ CAD - last ECHO in 3/19 with EF of < 20%  BNP 3000 however patient euvolemic   - Per the patient and wife he was recently switched to lasix 40 once daily. (Please confirm this in AM with cards or EP)   - Continue b blocker (also recently changed to 12.5 twice daily), and lasix. Spironolactone stopped    - Consider adding ace/arb   - c/w statin     # CKD 3b - Creatinine stable 1.3-1.5 in past months   Non oliguric   - Continue Lasix   - Monitor BMP     # COPD on home O2 - stable   - Continue spiriva   - Avoid beta agonists     # History of rheumatic heart disease s/p mechanical Mitral valve and bioprosthetic aortic valve.  - On coumadin to target INR of 2.5-3.5    - Coumadin 2.5 mg on Monday and 3.75 all other days     # Thoracic aortic aneurism  - CT angio in (2/2019) showed Ascending thoracic aortic aneurysm measuring up to 4.8 cm and descending thoracic aortic aneurysm measuring 3.7 cm, stable.  - Outpatient follow up imaging for stability every 6 months (next one should be done next months as 4.5 to 5.4 cm)  - Control blood pressure   - Avoid fluoroquinolones if antibiotics needed   - c/w statin     # Incidental right renal lesion found on CT scan; Indeterminate right interpolar 1.1 cm renal lesion.   - Out patient follow-up renal mass protocol CT or MRI is recommended in 3-6 months.      GI PPX: Pantoprazole   DVT PPX: On coumadin     DIET:  ACTIVITY:  () Ad Margarita  /  (X) Advance as Tolerated  /  () Bed Rest  /  () Fall Precaution  /  () Seizure precaution    ================= PRESENT TODAY ==================    1-Woodard Catheter: No  Indication:  2-Vascular Access:  [X]Peripheral | Indication:  []Midline | Indication:   []PICC Line | Indication:  []CVC | Indication:  []Arterial Line | Indication:  []Uldall Catheter | Indication:   3-IV Fluids: No | Indication:  4-Ventilation: Room air | Sat:     ================= DISPOSITION ==================    Patient to be discharged when condition(s) optimized.            Discharge to: Home when stable             Home services:              Counseled on/Discussed cessation of:  () Risky behaviors  /  () Smoking cessation  /  () Diet  /  () Exercise  /  () Other    ================= CODE STATUS =================                  (X) FULL CODE     |     () DNR     |     () DNI    () Discussion with patient and/or family regarding goals of care

## 2019-07-22 NOTE — H&P ADULT - HISTORY OF PRESENT ILLNESS
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 Patient was recently admitted for firing of his AICD in 4/2019, at the time it was upgraded to a BiV CC: Chest pressure and lightheadedness    78 year old with PMH of MI in 1995, systolic HFrEF with EF < 20% s/p AICD, COPD on home oxygen, (rheumatic heart disease), bioprosthetic aortic valve and per the patient and mechanical mitral valve (mechanical MVR implanted in 1995 at the time of his IWMI and SVG to the RCA.  The MVR was performed at [facility] by Dr. 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 Patient was recently admitted for firing of his AICD in 4/2019, at the time it was upgraded to a BiV reprogrammed at the time to STIMULATE FROM ELECTRODE 2/3 THRESOLD 1.75 @ 0.5 msec  with  no evidence of diaphragmaic stimulaion at a rate to 85/min then 90/min per the patient in the clinic.     Current history goes back to the day of presentation when the patient had an episode of lightheadedness as the patient was walking at home which required him to lie down with resolution. There was no syncope or presyncope, there was no palpitations, no headaches, no vision changes and no shortness of breath. Patient has been feeling lightheaded occasionally particularly on standing up, and while shaving around his neck. He has been constipated however episode was not related to straining. He endorses nausea as well in the morning which he attributes to medication side effect.   He also endorses chest pressure, not related to exertion or rest, band like at the epigastric level, which improved with maalox and leon tea and burping. It is not associated with diaphoresis and does not attribute this to his heart.     In the ED, vitals were stable. Labs showed negative cardiac enzymes and BNP of 3000 (2000 in 3/2019 and 3000 in 2/2019)

## 2019-07-22 NOTE — H&P ADULT - NSHPPHYSICALEXAM_GEN_ALL_CORE
=================== OBJECTIVE ===================    VITAL SIGNS: Last 24 Hours  T(C): 36.3 (22 Jul 2019 18:23), Max: 36.7 (22 Jul 2019 14:41)  T(F): 97.4 (22 Jul 2019 18:23), Max: 98.1 (22 Jul 2019 14:41)  HR: 96 (22 Jul 2019 18:23) (96 - 97)  BP: 113/64 (22 Jul 2019 18:23) (100/73 - 113/64)  BP(mean): --  RR: 17 (22 Jul 2019 18:23) (16 - 17)  SpO2: 98% (22 Jul 2019 18:23) (98% - 98%)    PHYSICAL EXAM:  GENERAL: NAD, well-developed  NECK: Supple, No JVD  CHEST/LUNG: Clear to auscultation bilaterally; No wheeze  HEART: Regular rate and rhythm; No murmurs, loud S1  ABDOMEN: Soft, Nontender, distended; Bowel sounds present in all 4 quadrants  EXTREMITIES: Peripheral Pulses present, No clubbing, cyanosis, or edema  PSYCH: AAOx3  NEUROLOGY: non-focal  SKIN: No rashes or lesions =================== OBJECTIVE ===================    VITAL SIGNS: Last 24 Hours  T(C): 36.3 (22 Jul 2019 18:23), Max: 36.7 (22 Jul 2019 14:41)  T(F): 97.4 (22 Jul 2019 18:23), Max: 98.1 (22 Jul 2019 14:41)  HR: 96 (22 Jul 2019 18:23) (96 - 97)  BP: 113/64 (22 Jul 2019 18:23) (100/73 - 113/64)  BP(mean): --  RR: 17 (22 Jul 2019 18:23) (16 - 17)  SpO2: 98% (22 Jul 2019 18:23) (98% - 98%)    PHYSICAL EXAM:  GENERAL: NAD, well-developed  NECK: Supple, No JVD  CHEST/LUNG: Clear to auscultation bilaterally; No wheeze  HEART/CVS: Regular rate and rhythm; No murmurs, loud S1  ABDOMEN/GI: Soft, Nontender, distended; Bowel sounds present in all 4 quadrants  EXTREMITIES: Peripheral Pulses present, No clubbing, cyanosis, or edema  PSYCH: AAOx3  NEUROLOGY: non-focal  SKIN: No rashes or lesions

## 2019-07-22 NOTE — H&P ADULT - NSHPREVIEWOFSYSTEMS_GEN_ALL_CORE
REVIEW OF SYSTEMS:    CONSTITUTIONAL: + weakness, fevers or chills  EYES/ENT: No visual changes;  No vertigo or throat pain   NECK: No pain or stiffness  RESPIRATORY: No cough, wheezing, hemoptysis; No shortness of breath  CARDIOVASCULAR: + chest pain, no palpitations  GASTROINTESTINAL: + Epigastric pain. No nausea, vomiting, or hematemesis; ++ constipation. No melena or hematochezia.  GENITOURINARY: No dysuria, frequency or hematuria  NEUROLOGICAL: + numbness of right upper and left lower extremity   SKIN: No itching, rashes

## 2019-07-22 NOTE — ED ADULT NURSE NOTE - OBJECTIVE STATEMENT
Pt states " I was home on the couch and felt light headed so I got up and then laid myself on the floor just in case I was going to pass out" denies loc denies chest pain at this time denies n/v/f/d/dizziness at this time. Pt has pacemaker.

## 2019-07-22 NOTE — H&P ADULT - ATTENDING COMMENTS
Patient seen and examined independently. Resident's H & P reviewed. Agree with the findings and plan of care except,    A 78 years old presented to the ED with lightheadedness which happened while he was walking at home and resolved on lying down in the bed. No LOC. Also gives H/O chest pressure.    EKG: Dual paced rhythm @ 95/min. Frequent PVC'S and ventricular paced rhythms. (Interpreted by me.)  CE x 3 negative  BNP: 3080  CXR: NAPD    ASSESSMENT:    1. Lightheadedness  2. Ch. HFrEF  3. CKD-3  4. S/P AICD  5. S/P AVR & MVR (Metallic)  6. COPD on home O2  7. CAD S/P CABG  8. Chest pressure likely due to anxiety    PLAN:    . Cont tele  . EP eval  . Device interrogation  . Please check orthostatic vitals  . Pt had negative stress test on 4/19 Patient seen and examined independently. Resident's H & P reviewed. Agree with the findings and plan of care except,    A 78 years old presented to the ED with lightheadedness which happened while he was walking at home and resolved on lying down in the bed. No LOC. Also gives H/O chest pressure.    EKG: Dual paced rhythm @ 95/min. Frequent PVC'S and ventricular paced rhythms. (Interpreted by me.)  CE x 3 negative  BNP: 3080  CXR: NAPD    ASSESSMENT:    1. Lightheadedness  2. Ch. HFrEF  3. CKD-3  4. S/P AICD  5. S/P AVR & MVR (Metallic)  6. COPD on home O2  7. CAD S/P CABG  8. Chest pressure likely due to anxiety    PLAN:    . Cont tele  . EP eval  . Device interrogation  . Orthostatic vital noted. No orthostasis.   . Pt had negative stress test on 4/19 Patient seen and examined independently. Resident's H & P reviewed. Agree with the findings and plan of care except,    A 78 years old presented to the ED with lightheadedness which happened while he was walking at home and resolved on lying down in the bed. No LOC. Also gives H/O chest pressure.    EKG: Dual paced rhythm @ 95/min. Frequent PVC'S and ventricular paced rhythms. (Interpreted by me.)  CE x 3 negative  BNP: 3080  CXR: NAPD    ASSESSMENT:    1. Lightheadedness  2. Ch. HFrEF  3. CKD-3  4. S/P AICD  5. S/P AVR & MVR (Metallic)  6. COPD on home O2  7. CAD S/P CABG  8. Chest pressure likely due to anxiety  9. VT    PLAN:    . Cont tele  . EP eval  . Device interrogation  . Orthostatic vital noted. No orthostasis.   . Pt had negative stress test on 4/19

## 2019-07-22 NOTE — ED PROVIDER NOTE - CLINICAL SUMMARY MEDICAL DECISION MAKING FREE TEXT BOX
ED work up reviewed,.  EP evaluated patient and believes pacemaker is working appropriately and has PVC's and Dr. Daily made some advanced changes to PPM from previous visit. They recommend admission overnight and Dr. Daily to see in the morning.  Patient has chest pain and requires admission for repeat Troponin and further testing.

## 2019-07-22 NOTE — ED PROVIDER NOTE - OBJECTIVE STATEMENT
pt is a 78 yom w/ hx of valve replacement on coumadin, HTN, ppm, DLE in 1995 here for chest pressure this AM with presyncopal sx. pt called EMS. BIBA, 2 aspirin given in triage. now pt SOB. no cp, no seizure pt is a 78 yom w/ hx of valve replacement on coumadin, HTN, ppm, DLE in 1995 here for non radiating 5/10 chest pressure this AM with presyncopal sx. pt called EMS. BIBA, 2 aspirin given in triage. now pt SOB. no n/v/d, no seizure

## 2019-07-22 NOTE — ED PROVIDER NOTE - ATTENDING CONTRIBUTION TO CARE
79 y/o M PMHx Open heart surgery with valve replacement on coumadin, HTN, ppm, DLE in 1995 here for chest pressure this AM with presyncopal sx. pt called EMS. BIBA, 2 aspirin given in triage. now pt SOB. no cp, no seizure.    Patient has an abnormal EKG.  it appears that he has PVC's and his pacemaker has failure to capture. No active chest pain.  No sign of STEMI on EKG given bundle branch block.    On exam, VS reviewed. Patient has no chest tenderness, a normal abdominal exam. IV placed and labs sent.  Appropriate labs and x-ray done.    Will consult EP (Dr. Daily PMD). Will follow up work up and reassess.

## 2019-07-22 NOTE — ED ADULT NURSE REASSESSMENT NOTE - NS ED NURSE REASSESS COMMENT FT1
Patient received laying in bed. No SOB or distress noted. Patient denies any complaints of pain or discomfort. No complaints of chest pain, dizziness or headache noted. Pt. was moved to ED 3, report given to RN. Continues on cardiac monitoring.

## 2019-07-22 NOTE — ED ADULT TRIAGE NOTE - CHIEF COMPLAINT QUOTE
wife called 911 due to patient feeling light headed and laid himself "on the floor just in case he was gonna pass out"

## 2019-07-23 ENCOUNTER — TRANSCRIPTION ENCOUNTER (OUTPATIENT)
Age: 79
End: 2019-07-23

## 2019-07-23 VITALS — TEMPERATURE: 96 F | RESPIRATION RATE: 18 BRPM | HEART RATE: 90 BPM

## 2019-07-23 LAB
ALBUMIN SERPL ELPH-MCNC: 3.9 G/DL — SIGNIFICANT CHANGE UP (ref 3.5–5.2)
ALP SERPL-CCNC: 73 U/L — SIGNIFICANT CHANGE UP (ref 30–115)
ALT FLD-CCNC: 41 U/L — SIGNIFICANT CHANGE UP (ref 0–41)
ANION GAP SERPL CALC-SCNC: 12 MMOL/L — SIGNIFICANT CHANGE UP (ref 7–14)
APTT BLD: 45.5 SEC — HIGH (ref 27–39.2)
APTT BLD: 50 SEC — HIGH (ref 27–39.2)
AST SERPL-CCNC: 31 U/L — SIGNIFICANT CHANGE UP (ref 0–41)
BASOPHILS # BLD AUTO: 0.03 K/UL — SIGNIFICANT CHANGE UP (ref 0–0.2)
BASOPHILS NFR BLD AUTO: 0.4 % — SIGNIFICANT CHANGE UP (ref 0–1)
BILIRUB SERPL-MCNC: 0.5 MG/DL — SIGNIFICANT CHANGE UP (ref 0.2–1.2)
BUN SERPL-MCNC: 22 MG/DL — HIGH (ref 10–20)
CALCIUM SERPL-MCNC: 9.6 MG/DL — SIGNIFICANT CHANGE UP (ref 8.5–10.1)
CHLORIDE SERPL-SCNC: 101 MMOL/L — SIGNIFICANT CHANGE UP (ref 98–110)
CK MB CFR SERPL CALC: 1.6 NG/ML — SIGNIFICANT CHANGE UP (ref 0.6–6.3)
CK SERPL-CCNC: 45 U/L — SIGNIFICANT CHANGE UP (ref 0–225)
CO2 SERPL-SCNC: 27 MMOL/L — SIGNIFICANT CHANGE UP (ref 17–32)
CREAT SERPL-MCNC: 1.4 MG/DL — SIGNIFICANT CHANGE UP (ref 0.7–1.5)
EOSINOPHIL # BLD AUTO: 0.04 K/UL — SIGNIFICANT CHANGE UP (ref 0–0.7)
EOSINOPHIL NFR BLD AUTO: 0.6 % — SIGNIFICANT CHANGE UP (ref 0–8)
GLUCOSE SERPL-MCNC: 100 MG/DL — HIGH (ref 70–99)
HCT VFR BLD CALC: 46.7 % — SIGNIFICANT CHANGE UP (ref 42–52)
HGB BLD-MCNC: 15 G/DL — SIGNIFICANT CHANGE UP (ref 14–18)
IMM GRANULOCYTES NFR BLD AUTO: 0.3 % — SIGNIFICANT CHANGE UP (ref 0.1–0.3)
INR BLD: 3.13 RATIO — HIGH (ref 0.65–1.3)
INR BLD: 3.58 RATIO — HIGH (ref 0.65–1.3)
LYMPHOCYTES # BLD AUTO: 1.39 K/UL — SIGNIFICANT CHANGE UP (ref 1.2–3.4)
LYMPHOCYTES # BLD AUTO: 20.3 % — LOW (ref 20.5–51.1)
MAGNESIUM SERPL-MCNC: 2.2 MG/DL — SIGNIFICANT CHANGE UP (ref 1.8–2.4)
MCHC RBC-ENTMCNC: 30.1 PG — SIGNIFICANT CHANGE UP (ref 27–31)
MCHC RBC-ENTMCNC: 32.1 G/DL — SIGNIFICANT CHANGE UP (ref 32–37)
MCV RBC AUTO: 93.6 FL — SIGNIFICANT CHANGE UP (ref 80–94)
MONOCYTES # BLD AUTO: 0.76 K/UL — HIGH (ref 0.1–0.6)
MONOCYTES NFR BLD AUTO: 11.1 % — HIGH (ref 1.7–9.3)
NEUTROPHILS # BLD AUTO: 4.6 K/UL — SIGNIFICANT CHANGE UP (ref 1.4–6.5)
NEUTROPHILS NFR BLD AUTO: 67.3 % — SIGNIFICANT CHANGE UP (ref 42.2–75.2)
NRBC # BLD: 0 /100 WBCS — SIGNIFICANT CHANGE UP (ref 0–0)
PLATELET # BLD AUTO: 191 K/UL — SIGNIFICANT CHANGE UP (ref 130–400)
POTASSIUM SERPL-MCNC: 4.1 MMOL/L — SIGNIFICANT CHANGE UP (ref 3.5–5)
POTASSIUM SERPL-SCNC: 4.1 MMOL/L — SIGNIFICANT CHANGE UP (ref 3.5–5)
PROT SERPL-MCNC: 7.1 G/DL — SIGNIFICANT CHANGE UP (ref 6–8)
PROTHROM AB SERPL-ACNC: 35.6 SEC — HIGH (ref 9.95–12.87)
PROTHROM AB SERPL-ACNC: >40 SEC — HIGH (ref 9.95–12.87)
RBC # BLD: 4.99 M/UL — SIGNIFICANT CHANGE UP (ref 4.7–6.1)
RBC # FLD: 13.2 % — SIGNIFICANT CHANGE UP (ref 11.5–14.5)
SODIUM SERPL-SCNC: 140 MMOL/L — SIGNIFICANT CHANGE UP (ref 135–146)
TROPONIN T SERPL-MCNC: <0.01 NG/ML — SIGNIFICANT CHANGE UP
WBC # BLD: 6.84 K/UL — SIGNIFICANT CHANGE UP (ref 4.8–10.8)
WBC # FLD AUTO: 6.84 K/UL — SIGNIFICANT CHANGE UP (ref 4.8–10.8)

## 2019-07-23 PROCEDURE — 99232 SBSQ HOSP IP/OBS MODERATE 35: CPT

## 2019-07-23 PROCEDURE — 93284 PRGRMG EVAL IMPLANTABLE DFB: CPT | Mod: 26

## 2019-07-23 PROCEDURE — 93010 ELECTROCARDIOGRAM REPORT: CPT

## 2019-07-23 PROCEDURE — 93925 LOWER EXTREMITY STUDY: CPT | Mod: 26

## 2019-07-23 PROCEDURE — 99222 1ST HOSP IP/OBS MODERATE 55: CPT | Mod: 25

## 2019-07-23 PROCEDURE — 99223 1ST HOSP IP/OBS HIGH 75: CPT

## 2019-07-23 RX ORDER — MAGNESIUM OXIDE 400 MG ORAL TABLET 241.3 MG
400 TABLET ORAL DAILY
Refills: 0 | Status: DISCONTINUED | OUTPATIENT
Start: 2019-07-23 | End: 2019-07-23

## 2019-07-23 RX ORDER — WARFARIN SODIUM 2.5 MG/1
2 TABLET ORAL ONCE
Refills: 0 | Status: COMPLETED | OUTPATIENT
Start: 2019-07-23 | End: 2019-07-23

## 2019-07-23 RX ORDER — WARFARIN SODIUM 2.5 MG/1
2.5 TABLET ORAL ONCE
Refills: 0 | Status: DISCONTINUED | OUTPATIENT
Start: 2019-07-23 | End: 2019-07-23

## 2019-07-23 RX ADMIN — AMIODARONE HYDROCHLORIDE 300 MILLIGRAM(S): 400 TABLET ORAL at 06:03

## 2019-07-23 RX ADMIN — WARFARIN SODIUM 2 MILLIGRAM(S): 2.5 TABLET ORAL at 04:20

## 2019-07-23 RX ADMIN — Medication 10 MILLIGRAM(S): at 13:38

## 2019-07-23 RX ADMIN — PANTOPRAZOLE SODIUM 40 MILLIGRAM(S): 20 TABLET, DELAYED RELEASE ORAL at 06:03

## 2019-07-23 RX ADMIN — MAGNESIUM OXIDE 400 MG ORAL TABLET 400 MILLIGRAM(S): 241.3 TABLET ORAL at 13:41

## 2019-07-23 RX ADMIN — Medication 12.5 MILLIGRAM(S): at 06:04

## 2019-07-23 RX ADMIN — Medication 100 MILLIGRAM(S): at 06:04

## 2019-07-23 RX ADMIN — Medication 40 MILLIGRAM(S): at 06:04

## 2019-07-23 RX ADMIN — Medication 100 MILLIGRAM(S): at 13:40

## 2019-07-23 NOTE — PROGRESS NOTE ADULT - ASSESSMENT
Patient is a 78y old Male who presents with a chief complaint of Chest pressure and lightheadedness (2019 19:02)  Currently admitted to medicine with the primary diagnosis of Pacemaker malfunction    # Lightheadedness  Could be related to vasovagal (while shaving) vs orthostatic episode (while standing up)   Have to rule out arrythmia given history of recurrent v tach   Recent nuclear stress test, old scar on the inferior wall due to previous MI but no new ischemia (2019)  - Monitor in tele   - AICD interrogation by EP: per the patient but no changes done. To follow up in am   - Orthostatics BP  lyin/57 (HR 89) -> sittin/64 (HR 89) -> standin/62 (HR 88)  - F/u EP    # Chest pressure   Unlikely cardiac however given history rule out ACS as improves with maalox and tea and burping  - Cardiac enzymes negative x1. Repeat X2  - EKG was grossly unchanged from previous ones. Repeat in AM   - Will start maalox and PPI    # LLE numbness  For the past 5 months, and while patient does not endorse claudication, does will limb is colder and does endorse weakness on ambulation.   Pulses present, no neurological deficits   - Arterial duplex ordered     # chronic HFrEF/ CAD - last ECHO in 3/19 with EF of < 20%  BNP 3000 however patient euvolemic   - Per the patient and wife he was recently switched to lasix 40 once daily. (Please confirm this in AM with cards or EP)   - Continue b blocker (also recently changed to 12.5 twice daily), and lasix. Spironolactone stopped    - Consider adding ace/arb   - c/w statin     # CKD 3b - Creatinine stable 1.3-1.5 in past months   Non oliguric   - Continue Lasix   - Monitor BMP     # COPD on home O2 - stable   - Continue spiriva   - Avoid beta agonists     # History of rheumatic heart disease s/p mechanical Mitral valve and bioprosthetic aortic valve.  - On coumadin to target INR of 2.5-3.5    - Coumadin 2.5 mg on Monday and 3.75 all other days     # Thoracic aortic aneurism  - CT angio in (2019) showed Ascending thoracic aortic aneurysm measuring up to 4.8 cm and descending thoracic aortic aneurysm measuring 3.7 cm, stable.  - Outpatient follow up imaging for stability every 6 months (next one should be done next months as 4.5 to 5.4 cm)  - Control blood pressure   - Avoid fluoroquinolones if antibiotics needed   - c/w statin     # Incidental right renal lesion found on CT scan; Indeterminate right interpolar 1.1 cm renal lesion.   - Out patient follow-up renal mass protocol CT or MRI is recommended in 3-6 months.      GI PPX: Pantoprazole   DVT PPX: On coumadin     DIET:  ACTIVITY:  () Ad Margarita  /  (X) Advance as Tolerated  /  () Bed Rest  /  () Fall Precaution  /  () Seizure precautionEKG: Dual paced rhythm @ 95/min. Frequent PVC'S and ventricular paced rhythms. (Interpreted by me.)  CE x 3 negative  BNP: 3080   CXR: NAPD    ASSESSMENT:    1. Lightheadedness  2. Ch. HFrEF   3. CKD-3  4. S/P AICD  5. S/P AVR & MVR (Metallic)  6. COPD on home O2  7. CAD S/P CABG  8. Chest pressure likely due to anxiety    PLAN:    . Cont tele  . EP eval  . Device interrogation  . Orthostatic vital noted. No orthostasis.   . Pt had negative stress test on

## 2019-07-23 NOTE — DISCHARGE NOTE NURSING/CASE MANAGEMENT/SOCIAL WORK - NSDCDPATPORTLINK_GEN_ALL_CORE
You can access the [facility], offered by [facility], by registering with the following website: [email or web address]

## 2019-07-23 NOTE — DISCHARGE NOTE NURSING/CASE MANAGEMENT/SOCIAL WORK - NSDCPEEMAIL_GEN_ALL_CORE
[facility] for Tobacco Control email [email or web address]

## 2019-07-23 NOTE — DISCHARGE NOTE PROVIDER - HOSPITAL COURSE
78 year old with PMH of MI in 1995, systolic HFrEF with EF < 20% s/p AICD, COPD on home oxygen, (rheumatic heart disease), bioprosthetic aortic valve and per the patient and mechanical mitral valve (mechanical MVR implanted in 1995 at the time of his IWMI and SVG to the RCA.  The MVR was performed at [facility] by Dr. 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 Patient was recently admitted for firing of his AICD in 4/2019, at the time it was upgraded to a BiV reprogrammed at the time to STIMULATE FROM ELECTRODE 2/3 THRESOLD 1.75 @ 0.5 msec  with  no evidence of diaphragmaic stimulaion at a rate to 85/min then 90/min per the patient in the clinic.         Current history goes back to the day of presentation when the patient had an episode of lightheadedness as the patient was walking at home which required him to lie down with resolution. There was no syncope or presyncope, there was no palpitations, no headaches, no vision changes and no shortness of breath. Patient has been feeling lightheaded occasionally particularly on standing up, and while shaving around his neck. He has been constipated however episode was not related to straining. He endorses nausea as well in the morning which he attributes to medication side effect.     He also endorses chest pressure, not related to exertion or rest, band like at the epigastric level, which improved with maalox and leon tea and burping. It is not associated with diaphoresis and does not attribute this to his heart.             Severe ischemic cardiomyopathy (EF<20%)s/p BiV ICD    CAD s/p CABG SVG to RCA 1995    S/P TAVR at [facility] for AS    Mechanical MVR    Recent monomorphic Vtach in 04/2019 with appropriate firing of AICD    Non-cardiac chest pain. Pt reports recurrent episodes of chest pain resolving on Maalox. Not reproduced by stress or exercise.     Adenosine NST negative for ischemia in 04/2019    EKG: A-V dual paced rhythm @90 bpm. No evidence of acute ischemic changes    trops -ve x3.    No evidence of HF decompensation. Continue medical management with BB, statin, Lasix 40mg QD.    Continue with amiodarone  per EP.    C/w Coumadin. Target INR 2.5-3.5

## 2019-07-23 NOTE — PROGRESS NOTE ADULT - SUBJECTIVE AND OBJECTIVE BOX
LENGTH OF HOSPITAL STAY: 1d    CHIEF COMPLAINT:   Patient is a 78y old  Male who presents with a chief complaint of Chest pressure and lightheadedness (23 Jul 2019 09:58)      Overnight events:    No acute events overnight    ALLERGIES:  No Known Allergies    MEDICATIONS:  STANDING MEDICATIONS  amiodarone    Tablet 300 milliGRAM(s) Oral daily  atorvastatin 40 milliGRAM(s) Oral at bedtime  dicyclomine 10 milliGRAM(s) Oral daily  docusate sodium 100 milliGRAM(s) Oral three times a day  furosemide    Tablet 40 milliGRAM(s) Oral daily  magnesium oxide 500 milliGRAM(s) Oral daily  metoprolol tartrate 12.5 milliGRAM(s) Oral two times a day  pantoprazole    Tablet 40 milliGRAM(s) Oral before breakfast  senna 2 Tablet(s) Oral at bedtime  tiotropium 18 MICROgram(s) Capsule 1 Capsule(s) Inhalation daily    PRN MEDICATIONS  ALPRAZolam 0.25 milliGRAM(s) Oral daily PRN  aluminum hydroxide/magnesium hydroxide/simethicone Suspension 30 milliLiter(s) Oral every 4 hours PRN    VITALS:   T(F): 97.8  HR: 88  BP: 123/70  RR: 18  SpO2: 98%    LABS:                        15.0   6.84  )-----------( 191      ( 23 Jul 2019 06:41 )             46.7     07-23    140  |  101  |  22<H>  ----------------------------<  100<H>  4.1   |  27  |  1.4    Ca    9.6      23 Jul 2019 06:41  Mg     2.2     07-23    TPro  7.1  /  Alb  3.9  /  TBili  0.5  /  DBili  x   /  AST  31  /  ALT  41  /  AlkPhos  73  07-23    PT/INR - ( 23 Jul 2019 06:41 )   PT: 35.60 sec;   INR: 3.13 ratio         PTT - ( 23 Jul 2019 06:41 )  PTT:50.0 sec      Creatine Kinase, Serum: 45 U/L (07-23-19 @ 06:41)  Troponin T, Serum: <0.01 ng/mL (07-23-19 @ 06:41)  Creatine Kinase, Serum: 36 U/L (07-22-19 @ 21:09)  Troponin T, Serum: <0.01 ng/mL (07-22-19 @ 21:09)  Troponin T, Serum: <0.01 ng/mL (07-22-19 @ 14:38)      CARDIAC MARKERS ( 23 Jul 2019 06:41 )  x     / <0.01 ng/mL / 45 U/L / x     / 1.6 ng/mL  CARDIAC MARKERS ( 22 Jul 2019 21:09 )  x     / <0.01 ng/mL / 36 U/L / x     / 1.3 ng/mL  CARDIAC MARKERS ( 22 Jul 2019 14:38 )  x     / <0.01 ng/mL / x     / x     / x            Cultures:      RADIOLOGY:    PHYSICAL EXAM:  GEN: No acute distress   LUNGS: Clear to auscultation bilaterally   HEART: S1/S2 present. RRR.   ABD: Soft, non-tender, non-distended. Bowel sounds present  EXT: no edema  NEURO: AAOX3

## 2019-07-23 NOTE — PHARMACOTHERAPY INTERVENTION NOTE - COMMENTS
Spoke with Dr. Jacob (ext: [phone]) regarding warfarin dose. Latest INR 7/23/19 at 1:06 am was 3.58, while is INR 2.5 - 3.5. Patient is mildly supratherapeutic and dose of warfarin will be switched from 2.5mg to 2mg one time dose.

## 2019-07-23 NOTE — DISCHARGE NOTE PROVIDER - NSDCCPCAREPLAN_GEN_ALL_CORE_FT
PRINCIPAL DISCHARGE DIAGNOSIS  Diagnosis: Pacemaker malfunction  Assessment and Plan of Treatment: Evaluated by ep. plan as per EP.      SECONDARY DISCHARGE DIAGNOSES  Diagnosis: Chest pain  Assessment and Plan of Treatment: Found to be secondary to Severe ischemic cardiomyopathy (EF<20%)s/p BiV ICD.  Patient recommended to follow up with Cardiology and to continue with warfarin.

## 2019-07-23 NOTE — CONSULT NOTE ADULT - ASSESSMENT
Assessment:    Plan: Assessment:  Lightannabel  HFrEF    Plan: Assessment:  Lightheadedness  HFrEF  Slow Vtach    Plan:  EKG on admission showed slow Vtach at a rate of 100 per minute  episode of V-sensing more than 2 hours per day which is indicative of recurrence of slow Vtach  optivol was rising above threshold  recommend overdrive pacing at 95 per minute  continue diuresis

## 2019-07-23 NOTE — CONSULT NOTE ADULT - ASSESSMENT
78 year old with PMH of MI in 1995, systolic HFrEF with EF < 20% s/p AICD, COPD on home oxygen, (rheumatic heart disease), bioprosthetic aortic valve and per the patient and mechanical mitral valve presenting with chest pressure and lightheadedness.    #Severe ischemic cardiomyopathy (EF<20%)s/p BiV ICD  CAD s/p CABG SVG to RCA 1995  S/P TAVR at [facility] for AS  Mechanical MVR  Recent monomorphic Vtach in 04/2019 with appropriate firing of AICD    - Adenosine NST negative for ischemia in 04/2019  - EKG: A-V dual paced rhythm @90 bpm. No evidence of acute ischemic changes  - Trops -ve x3  - No evidence of HF decompensation. Continue medical management with BB, statin, Lasix 40mg QD.  - Continue with amiodarone  per EP.  **Attending recs to follow** 78 year old with PMH of MI in 1995, systolic HFrEF with EF < 20% s/p AICD, COPD on home oxygen, (rheumatic heart disease), bioprosthetic aortic valve and per the patient and mechanical mitral valve presenting with chest pressure and lightheadedness.    #Severe ischemic cardiomyopathy (EF<20%)s/p BiV ICD  CAD s/p CABG SVG to RCA 1995  S/P TAVR at [facility] for AS  Mechanical MVR  Recent monomorphic Vtach in 04/2019 with appropriate firing of AICD    - Non-cardiac chest pain. Pt reports recurrent episodes of chest pain resolving on Maalox. Not reproduced by stress or exercise.   - Adenosine NST negative for ischemia in 04/2019  - EKG: A-V dual paced rhythm @90 bpm. No evidence of acute ischemic changes  - Trops -ve x3.  - No evidence of HF decompensation. Continue medical management with BB, statin, Lasix 40mg QD.  - Continue with amiodarone  per EP.  - C/w Coumadin. Target INR 2.5-3.5  **Attending recs to follow**

## 2019-07-23 NOTE — DISCHARGE NOTE PROVIDER - CARE PROVIDER_API CALL
Kamryn Fraser)  Cardiology; Internal Medicine  [address] [place] [address]  Phone: [phone]  Fax: [phone]  Follow Up Time:     Reynold Roblero)  Cardiovascular Disease; Internal Medicine  [address] [place] [address]  Phone: [phone]  Fax: [phone]  Follow Up Time:

## 2019-07-23 NOTE — CONSULT NOTE ADULT - SUBJECTIVE AND OBJECTIVE BOX
CHIEF COMPLAINT:Patient is a 78y old  Male who presents with a chief complaint of Chest pressure and lightheadedness (23 Jul 2019 08:54)      HISTORY OF PRESENT ILLNESS:   HPI:  CC: Chest pressure and lightheadedness    78 year old with PMH of CAD s/p CABG SVG to RCA 1995, systolic HFrEF with EF < 20% s/p AICD, COPD on home oxygen, (rheumatic heart disease), S/P TAVR at [facility] for AS and Mechanical MVR  implanted in 1995 at the time of his IWMI and SVG to the RCA.  The MVR was performed at [facility] by Dr. 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 Patient was recently admitted for firign AICD. AICD interrogation showed 2 episodes of monomorphic V-tach for which patient was appropriately shocked. Pt had his AICD upgraded to BiV ICD.    Current history goes back to the day of presentation when the patient had an episode of lightheadedness as the patient was walking at home which required him to lie down with resolution. There was no syncope or presyncope, there was no palpitations, no headaches, no vision changes and no shortness of breath. Patient has been feeling lightheaded occasionally particularly on standing up, and while shaving around his neck. He has been constipated however episode was not related to straining. He endorses nausea as well in the morning which he attributes to medication side effect.   He also endorses chest pressure, not related to exertion or rest, band like at the epigastric level, which improved with maalox and leon tea and burping. It is not associated with diaphoresis and does not attribute this to his heart.     In the ED, vitals were stable. 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 Costello (04 Apr 2019 15:37)  dicyclomine 10 mg oral capsule: 1 cap(s) orally once a day (30 Mar 2019 21:43)  furosemide 40 mg oral tablet: 1 tab(s) orally once a day (22 Jul 2019 19:44)  warfarin 2.5 mg oral tablet: 1 tab(s) orally once a day for three days and 1.25 mg on fourth day.  (30 Mar 2019 21:43)    MEDICATIONS  (STANDING):  amiodarone    Tablet 300 milliGRAM(s) Oral daily  atorvastatin 40 milliGRAM(s) Oral at bedtime  dicyclomine 10 milliGRAM(s) Oral daily  docusate sodium 100 milliGRAM(s) Oral three times a day  furosemide    Tablet 40 milliGRAM(s) Oral daily  magnesium oxide 500 milliGRAM(s) Oral daily  metoprolol tartrate 12.5 milliGRAM(s) Oral two times a day  pantoprazole    Tablet 40 milliGRAM(s) Oral before breakfast  senna 2 Tablet(s) Oral at bedtime  tiotropium 18 MICROgram(s) Capsule 1 Capsule(s) Inhalation daily    MEDICATIONS  (PRN):  ALPRAZolam 0.25 milliGRAM(s) Oral daily PRN anxiety  aluminum hydroxide/magnesium hydroxide/simethicone Suspension 30 milliLiter(s) Oral every 4 hours PRN Dyspepsia        SOCIAL HISTORY:    [  ] active smoker  [  ] non smoker  [  ] Etoh  [  ] recreational drugs    REVIEW OF SYSTEMS:  14 point ROS negative except as mentioned above in HPI    PHYSICAL EXAM:  T(C): 35.9 (07-23-19 @ 05:00), Max: 36.7 (07-22-19 @ 14:41)  HR: 88 (07-23-19 @ 05:00) (88 - 97)  BP: 123/70 (07-23-19 @ 05:00) (100/73 - 123/70)  RR: 20 (07-23-19 @ 05:00) (16 - 20)  SpO2: 98% (07-22-19 @ 19:45) (98% - 98%)  Wt(kg): --  I&O's Summary      General Appearance: in NAD	  HEENT: NC, No JVD appreciated  Cardiovascular: Normal S1 S2, No murmurs  Respiratory: cta b/l  Gastrointestinal:  Soft, Non-tender, BS +	  Neurologic: No focal deficits, AAOx3  Extremities: ROM wnl, No clubbing/cyanosis/edema  Skin: No rashes, No ecchymoses, No cyanosis  Vascular: Peripheral pulses palpable 2+ bilaterally    LABS:	 	                        15.0   6.84  )-----------( 191      ( 23 Jul 2019 06:41 )             46.7     07-23    140  |  101  |  22<H>  ----------------------------<  100<H>  4.1   |  27  |  1.4  Troponin T, Serum (07.23.19 @ 06:41)    Troponin T, Serum: <0.01 ng/mL      Ca    9.6      23 Jul 2019 06:41  Mg     2.2     07-23    TPro  7.1  /  Alb  3.9  /  TBili  0.5  /  DBili  x   /  AST  31  /  ALT  41  /  AlkPhos  73  07-23    eGFR if Non African American: 48 mL/min/1.73M2 (07-23-19 @ 06:41)  eGFR if Non African American: 44 mL/min/1.73M2 (07-22-19 @ 14:38)        Serum Pro-Brain Natriuretic Peptide: 3080 pg/mL (07-22-19 @ 14:38)      CARDIAC MARKERS:    Troponin T, Serum (07.23.19 @ 06:41)    Troponin T, Serum: <0.01 ng/mL    Troponin T, Serum (07.22.19 @ 21:09)    Troponin T, Serum: <0.01 ng/mL    Troponin T, Serum (07.22.19 @ 14:38)    Troponin T, Serum: <0.01: Hemolyzed. Interpret with caution ng/mL    CKMB Units (07.23.19 @ 06:41)    CKMB Units: 1.6 ng/mL    CKMB Units (07.22.19 @ 21:09)    CKMB Units: 1.3 ng/mL        TELEMETRY EVENTS: 	    ECG:  	A-V dual paced rhythm @90 bpm  RADIOLOGY:   	< from: Xray Chest 1 View-PORTABLE IMMEDIATE (07.22.19 @ 16:43) >  Impression:      No radiographic evidence of acute cardiopulmonary disease.    < end of copied text >      PREVIOUS DIAGNOSTIC TESTING:    [x] Echocardiogram:  < from: Transthoracic Echocardiogram (03.26.19 @ 09:17) >  1. Left ventricular ejection fraction, by visual estimation, is <20%.   2. Severely decreased global left ventricular systolic function.   3. Mean Gradient across MV is 7.   4. Mild tricuspid regurgitation.   5. Bioprosthesis in the aortic position.   6. Mild to moderate aortic regurgitation.   7. Peak gradient acros AV is 20 with a mean of 12.   8. Pulmonic valve regurgitation.   9. Dilatation of the ascending aorta and aortic root.    < end of copied text >    [x] Catheterization:  FINDINGS: 1/6/2016 1VD 100% RCA, patent SVG to RCA, normal LM, minor disease in LAD and left Circ      [x] Stress Test:  	    < from: NM Nuclear Stress Pharmacologic Multiple (04.01.19 @ 17:02) >  Impression:   1.   No definite evidence for ischemia during adenosine infusion as   described above.  2.  Large fixed defect in the inferior wall and inferior septum which   does not thicken consistent with prior injury (scar)  3.  Dilated left ventricle with marked global hypokinesis.  4.  Left ventricular ejection fraction calculated as <20% which is very   low. Wall motion analysis suggests fraction of 20%. Echocardiographic   estimated ejection fraction was <20% on 3/26/2019 and was <20% on   previous myocardial perfusion study of June 9, 2015    < end of copied text > CHIEF COMPLAINT:Patient is a 78y old  Male who presents with a chief complaint of Chest pressure and lightheadedness (23 Jul 2019 08:54)      HISTORY OF PRESENT ILLNESS:   HPI:  CC: Chest pressure and lightheadedness    78 year old with PMH of CAD s/p CABG SVG to RCA 1995, systolic HFrEF with EF < 20% s/p AICD, COPD on home oxygen, (rheumatic heart disease), S/P TAVR at [facility] for AS and Mechanical MVR  implanted in 1995 at the time of his IWMI and SVG to the RCA.  The MVR was performed at [facility] by Dr. 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 Patient was recently admitted for firign AICD. AICD interrogation showed 2 episodes of monomorphic V-tach for which patient was appropriately shocked. Pt had his AICD upgraded to BiV ICD.    Current history goes back to the day of presentation when the patient had an episode of lightheadedness as the patient was walking at home which required him to lie down with resolution. There was no syncope or presyncope, there was no palpitations, no headaches, no vision changes and no shortness of breath. Patient has been feeling lightheaded occasionally particularly on standing up, and while shaving around his neck. He has been constipated however episode was not related to straining. He endorses nausea as well in the morning which he attributes to medication side effect.   He also endorses chest pressure, not related to exertion or rest, band like at the epigastric level, which improved with maalox and leon tea and burping. It is not associated with diaphoresis and does not attribute this to his heart.     In the ED, vitals were stable. 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 Costello (04 Apr 2019 15:37)  dicyclomine 10 mg oral capsule: 1 cap(s) orally once a day (30 Mar 2019 21:43)  furosemide 40 mg oral tablet: 1 tab(s) orally once a day (22 Jul 2019 19:44)  warfarin 2.5 mg oral tablet: 1 tab(s) orally once a day for three days and 1.25 mg on fourth day.  (30 Mar 2019 21:43)    MEDICATIONS  (STANDING):  amiodarone    Tablet 300 milliGRAM(s) Oral daily  atorvastatin 40 milliGRAM(s) Oral at bedtime  dicyclomine 10 milliGRAM(s) Oral daily  docusate sodium 100 milliGRAM(s) Oral three times a day  furosemide    Tablet 40 milliGRAM(s) Oral daily  magnesium oxide 500 milliGRAM(s) Oral daily  metoprolol tartrate 12.5 milliGRAM(s) Oral two times a day  pantoprazole    Tablet 40 milliGRAM(s) Oral before breakfast  senna 2 Tablet(s) Oral at bedtime  tiotropium 18 MICROgram(s) Capsule 1 Capsule(s) Inhalation daily    MEDICATIONS  (PRN):  ALPRAZolam 0.25 milliGRAM(s) Oral daily PRN anxiety  aluminum hydroxide/magnesium hydroxide/simethicone Suspension 30 milliLiter(s) Oral every 4 hours PRN Dyspepsia        SOCIAL HISTORY:    [  ] active smoker  [  ] non smoker  [  ] Etoh  [  ] recreational drugs    REVIEW OF SYSTEMS:  14 point ROS negative except as mentioned above in HPI    PHYSICAL EXAM:  T(C): 35.9 (07-23-19 @ 05:00), Max: 36.7 (07-22-19 @ 14:41)  HR: 88 (07-23-19 @ 05:00) (88 - 97)  BP: 123/70 (07-23-19 @ 05:00) (100/73 - 123/70)  RR: 20 (07-23-19 @ 05:00) (16 - 20)  SpO2: 98% (07-22-19 @ 19:45) (98% - 98%)  Wt(kg): --  I&O's Summary      General Appearance: in NAD	  HEENT: NC, No JVD appreciated  Cardiovascular: Normal S1 (loud - mechanical)  S2, No murmurs  Respiratory: cta b/l  Gastrointestinal:  Soft, Non-tender, BS +	  Neurologic: No focal deficits, AAOx3  Extremities: ROM wnl, No clubbing/cyanosis/edema  Skin: No rashes, No ecchymoses, No cyanosis  Vascular: Peripheral pulses palpable 2+ bilaterally    LABS:	 	                        15.0   6.84  )-----------( 191      ( 23 Jul 2019 06:41 )             46.7     07-23    140  |  101  |  22<H>  ----------------------------<  100<H>  4.1   |  27  |  1.4  Troponin T, Serum (07.23.19 @ 06:41)    Troponin T, Serum: <0.01 ng/mL      Ca    9.6      23 Jul 2019 06:41  Mg     2.2     07-23    TPro  7.1  /  Alb  3.9  /  TBili  0.5  /  DBili  x   /  AST  31  /  ALT  41  /  AlkPhos  73  07-23    eGFR if Non African American: 48 mL/min/1.73M2 (07-23-19 @ 06:41)  eGFR if Non African American: 44 mL/min/1.73M2 (07-22-19 @ 14:38)        Serum Pro-Brain Natriuretic Peptide: 3080 pg/mL (07-22-19 @ 14:38)      CARDIAC MARKERS:    Troponin T, Serum (07.23.19 @ 06:41)    Troponin T, Serum: <0.01 ng/mL    Troponin T, Serum (07.22.19 @ 21:09)    Troponin T, Serum: <0.01 ng/mL    Troponin T, Serum (07.22.19 @ 14:38)    Troponin T, Serum: <0.01: Hemolyzed. Interpret with caution ng/mL    CKMB Units (07.23.19 @ 06:41)    CKMB Units: 1.6 ng/mL    CKMB Units (07.22.19 @ 21:09)    CKMB Units: 1.3 ng/mL        TELEMETRY EVENTS: 	    ECG:  	A-V dual paced rhythm @90 bpm  RADIOLOGY:   	< from: Xray Chest 1 View-PORTABLE IMMEDIATE (07.22.19 @ 16:43) >  Impression:      No radiographic evidence of acute cardiopulmonary disease.    < end of copied text >      PREVIOUS DIAGNOSTIC TESTING:    [x] Echocardiogram:  < from: Transthoracic Echocardiogram (03.26.19 @ 09:17) >  1. Left ventricular ejection fraction, by visual estimation, is <20%.   2. Severely decreased global left ventricular systolic function.   3. Mean Gradient across MV is 7.   4. Mild tricuspid regurgitation.   5. Bioprosthesis in the aortic position.   6. Mild to moderate aortic regurgitation.   7. Peak gradient acros AV is 20 with a mean of 12.   8. Pulmonic valve regurgitation.   9. Dilatation of the ascending aorta and aortic root.    < end of copied text >    [x] Catheterization:  FINDINGS: 1/6/2016 1VD 100% RCA, patent SVG to RCA, normal LM, minor disease in LAD and left Circ      [x] Stress Test:  	    < from: NM Nuclear Stress Pharmacologic Multiple (04.01.19 @ 17:02) >  Impression:   1.   No definite evidence for ischemia during adenosine infusion as   described above.  2.  Large fixed defect in the inferior wall and inferior septum which   does not thicken consistent with prior injury (scar)  3.  Dilated left ventricle with marked global hypokinesis.  4.  Left ventricular ejection fraction calculated as <20% which is very   low. Wall motion analysis suggests fraction of 20%. Echocardiographic   estimated ejection fraction was <20% on 3/26/2019 and was <20% on   previous myocardial perfusion study of June 9, 2015    < end of copied text >

## 2019-07-23 NOTE — CONSULT NOTE ADULT - SUBJECTIVE AND OBJECTIVE BOX
Patient is a 78y old  Male who presents with a chief complaint of Chest pressure and lightheadedness (2019 19:02)    HPI: 78 year old M with PMH of MI in , systolic HFrEF with EF < 20% s/p AICD, COPD on home oxygen, (rheumatic heart disease), bioprosthetic aortic valve and per the patient and mechanical mitral valve (mechanical MVR implanted in  at the time of his IWMI and SVG to the RCA.  The MVR was performed at [facility] by Dr. 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 Patient was recently admitted for firing of his AICD in 2019, at the time it was upgraded to a BiV reprogrammed at the time to STIMULATE FROM ELECTRODE 2/3 THRESOLD 1.75 @ 0.5 msec  with  no evidence of diaphragmaic stimulaion at a rate to 85/min then 90/min per the patient in the clinic.     Current history goes back to the day of presentation when the patient had an episode of lightheadedness as the patient was walking at home which required him to lie down with resolution. There was no syncope or presyncope, there was no palpitations, no headaches, no vision changes and no shortness of breath. Patient has been feeling lightheaded occasionally particularly on standing up, and while shaving around his neck. He has been constipated however episode was not related to straining. He endorses nausea as well in the morning which he attributes to medication side effect.   He also endorses chest pressure, not related to exertion or rest, band like at the epigastric level, which improved with maalox and leon tea and burping. It is not associated with diaphoresis and does not attribute this to his heart.     In the ED, vitals were stable. Labs showed negative cardiac enzymes and BNP of 3000 (2000 in 3/2019 and 3000 in 2019) (2019 19:02)      PAST MEDICAL & SURGICAL HISTORY:  CAD (coronary artery disease)  Hyperlipidemia, unspecified hyperlipidemia type  Other irritable bowel syndrome  Former smoker, stopped smoking many years ago  Cancer: Basal Cell / Squamous Cell  Osteoarthritis  ICD (implantable cardioverter-defibrillator) in place  Chronic systolic congestive heart failure  MI, old: MI / Cardiac Arrest   ROMERO (dyspnea on exertion)  Chronic obstructive pulmonary disease, unspecified COPD type  S/P CABG x 1  H/O squamous cell carcinoma excision: BELOW LEFT EYE  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 Left ventricular ejection fraction, by visual estimation, is <20%.   2. Severely decreased global left ventricular systolic function.   3. Mean Gradient across MV is 7.   4. Mild tricuspid regurgitation.   5. Bioprosthesis in the aortic position.   6. Mild to moderate aortic regurgitation.   7. Peak gradient acros AV is 20 with a mean of 12.   8. Pulmonic valve regurgitation.   9. Dilatation of the ascending aorta and aortic root.      CATHETERIZATION  FINDINGS: 2016 1VD 100% RCA, patent SVG to RCA, normal LM, minor disease in LAD and left Circ    FINDINGS:  MEDICATIONS  (STANDING):  amiodarone    Tablet 300 milliGRAM(s) Oral daily  atorvastatin 40 milliGRAM(s) Oral at bedtime  dicyclomine 10 milliGRAM(s) Oral daily  docusate sodium 100 milliGRAM(s) Oral three times a day  furosemide    Tablet 40 milliGRAM(s) Oral daily  magnesium oxide 500 milliGRAM(s) Oral daily  metoprolol tartrate 12.5 milliGRAM(s) Oral two times a day  pantoprazole    Tablet 40 milliGRAM(s) Oral before breakfast  senna 2 Tablet(s) Oral at bedtime  tiotropium 18 MICROgram(s) Capsule 1 Capsule(s) Inhalation daily    MEDICATIONS  (PRN):  ALPRAZolam 0.25 milliGRAM(s) Oral daily PRN anxiety  aluminum hydroxide/magnesium hydroxide/simethicone Suspension 30 milliLiter(s) Oral every 4 hours PRN Dyspepsia   Home Medications:  amiodarone 200 mg oral tablet: 1.5 tab(s) orally once a day start from 19 and continue until you see Dr. Costello (2019 15:37)  dicyclomine 10 mg oral capsule: 1 cap(s) orally once a day (30 Mar 2019 21:43)  furosemide 40 mg oral tablet: 1 tab(s) orally once a day (2019 19:44)  warfarin 2.5 mg oral tablet: 1 tab(s) orally once a day for three days and 1.25 mg on fourth day.  (30 Mar 2019 21:43)      FAMILY HISTORY:  Cancer (Sibling): Brother: Prostate cancer  Family history of heart disease (Sibling): Brother    SOCIAL HISTORY: Ex-smoker quit . No alcohol use. No illicit drug use.    Vital Signs Last 24 Hrs  T(C): 35.9 (2019 05:00), Max: 36.7 (2019 14:41)  T(F): 96.7 (2019 05:00), Max: 98.1 (2019 14:41)  HR: 88 (2019 05:00) (88 - 97)  BP: 123/70 (2019 05:00) (100/73 - 123/70)  BP(mean): --  RR: 20 (2019 05:00) (16 - 20)  SpO2: 98% (2019 19:45) (98% - 98%)          INTERPRETATION OF TELEMETRY:    ECG:        LABS:                        15.0   6.84  )-----------( 191      ( 2019 06:41 )             46.7     07-23    140  |  101  |  22<H>  ----------------------------<  100<H>  4.1   |  27  |  1.4    Ca    9.6      2019 06:41  Mg     2.2     07-23    TPro  7.1  /  Alb  3.9  /  TBili  0.5  /  DBili  x   /  AST  31  /  ALT  41  /  AlkPhos  73  07-23    CARDIAC MARKERS ( 2019 06:41 )  x     / <0.01 ng/mL / 45 U/L / x     / 1.6 ng/mL  CARDIAC MARKERS ( 2019 21:09 )  x     / <0.01 ng/mL / 36 U/L / x     / 1.3 ng/mL  CARDIAC MARKERS ( 2019 14:38 )  x     / <0.01 ng/mL / x     / x     / x        PT/INR - ( 2019 06:41 )   PT: 35.60 sec;   INR: 3.13 ratio       PTT - ( 2019 06:41 )  PTT:50.0 sec  LIVER FUNCTIONS - ( 2019 06:41 )  Alb: 3.9 g/dL / Pro: 7.1 g/dL / ALK PHOS: 73 U/L / ALT: 41 U/L / AST: 31 U/L / GGT: x           BNPSerum Pro-Brain Natriuretic Peptide: 3080 pg/mL <H> ( @ 14:38)        Daily Height in cm: 165.1 (2019 23:45)    Daily Weight in k.8 (2019 23:45)    RADIOLOGY & ADDITIONAL STUDIES: Patient is a 78y old  Male who presents with a chief complaint of Chest pressure and lightheadedness (2019 19:02)    HPI: 78 year old M with PMH of MI in , systolic HFrEF with EF < 20% s/p AICD, COPD on home oxygen, (rheumatic heart disease), bioprosthetic aortic valve and per the patient and mechanical mitral valve (mechanical MVR implanted in  at the time of his IWMI and SVG to the RCA.  The MVR was performed at [facility] by Dr. 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 Patient was recently admitted for firing of his AICD in 2019, at the time it was upgraded to a BiV reprogrammed at the time to STIMULATE FROM ELECTRODE 2/3 THRESOLD 1.75 @ 0.5 msec  with  no evidence of diaphragmaic stimulaion at a rate to 85/min then 90/min per the patient in the clinic.     Current history goes back to the day of presentation when the patient had an episode of lightheadedness as the patient was walking at home which required him to lie down with resolution. There was no syncope or presyncope, there was no palpitations, no headaches, no vision changes and no shortness of breath. Patient has been feeling lightheaded occasionally particularly on standing up, and while shaving around his neck. He has been constipated however episode was not related to straining. He endorses nausea as well in the morning which he attributes to medication side effect.   He also endorses chest pressure, not related to exertion or rest, band like at the epigastric level, which improved with maalox and leon tea and burping. It is not associated with diaphoresis and does not attribute this to his heart.     In the ED, vitals were stable. Labs showed negative cardiac enzymes and BNP of 3000 (2000 in 3/2019 and 3000 in 2019) (2019 19:02)      PAST MEDICAL & SURGICAL HISTORY:  CAD (coronary artery disease)  Hyperlipidemia, unspecified hyperlipidemia type  Other irritable bowel syndrome  Former smoker, stopped smoking many years ago  Cancer: Basal Cell / Squamous Cell  Osteoarthritis  ICD (implantable cardioverter-defibrillator) in place  Chronic systolic congestive heart failure  MI, old: MI / Cardiac Arrest   ROMERO (dyspnea on exertion)  Chronic obstructive pulmonary disease, unspecified COPD type  S/P CABG x 1  H/O squamous cell carcinoma excision: BELOW LEFT EYE  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 Left ventricular ejection fraction, by visual estimation, is <20%.   2. Severely decreased global left ventricular systolic function.   3. Mean Gradient across MV is 7.   4. Mild tricuspid regurgitation.   5. Bioprosthesis in the aortic position.   6. Mild to moderate aortic regurgitation.   7. Peak gradient acros AV is 20 with a mean of 12.   8. Pulmonic valve regurgitation.   9. Dilatation of the ascending aorta and aortic root.      CATHETERIZATION  FINDINGS: 2016 1VD 100% RCA, patent SVG to RCA, normal LM, minor disease in LAD and left Circ    STRESS  FINDINGS:  < from: NM Nuclear Stress Pharmacologic Multiple (19 @ 17:02) >  Impression:   1.   No definite evidence for ischemia during adenosine infusion as   described above.  2.  Large fixed defect in the inferior wall and inferior septum which   does not thicken consistent with prior injury (scar)  3.  Dilated left ventricle with marked global hypokinesis.  4.  Left ventricular ejection fraction calculated as <20% which is very   low. Wall motion analysis suggests fraction of 20%. Echocardiographic   estimated ejection fraction was <20% on 3/26/2019 and was <20% on   previous myocardial perfusion study of 2015    MEDICATIONS  (STANDING):  amiodarone    Tablet 300 milliGRAM(s) Oral daily  atorvastatin 40 milliGRAM(s) Oral at bedtime  dicyclomine 10 milliGRAM(s) Oral daily  docusate sodium 100 milliGRAM(s) Oral three times a day  furosemide    Tablet 40 milliGRAM(s) Oral daily  magnesium oxide 500 milliGRAM(s) Oral daily  metoprolol tartrate 12.5 milliGRAM(s) Oral two times a day  pantoprazole    Tablet 40 milliGRAM(s) Oral before breakfast  senna 2 Tablet(s) Oral at bedtime  tiotropium 18 MICROgram(s) Capsule 1 Capsule(s) Inhalation daily    MEDICATIONS  (PRN):  ALPRAZolam 0.25 milliGRAM(s) Oral daily PRN anxiety  aluminum hydroxide/magnesium hydroxide/simethicone Suspension 30 milliLiter(s) Oral every 4 hours PRN Dyspepsia   Home Medications:  amiodarone 200 mg oral tablet: 1.5 tab(s) orally once a day start from 19 and continue until you see Dr. Costello (2019 15:37)  dicyclomine 10 mg oral capsule: 1 cap(s) orally once a day (30 Mar 2019 21:43)  furosemide 40 mg oral tablet: 1 tab(s) orally once a day (2019 19:44)  warfarin 2.5 mg oral tablet: 1 tab(s) orally once a day for three days and 1.25 mg on fourth day.  (30 Mar 2019 21:43)      FAMILY HISTORY:  Cancer (Sibling): Brother: Prostate cancer  Family history of heart disease (Sibling): Brother    SOCIAL HISTORY: Ex-smoker quit . No alcohol use. No illicit drug use.    Vital Signs Last 24 Hrs  T(C): 35.9 (2019 05:00), Max: 36.7 (2019 14:41)  T(F): 96.7 (2019 05:00), Max: 98.1 (2019 14:41)  HR: 88 (2019 05:00) (88 - 97)  BP: 123/70 (2019 05:00) (100/73 - 123/70)  BP(mean): --  RR: 20 (2019 05:00) (16 - 20)  SpO2: 98% (2019 19:45) (98% - 98%)          INTERPRETATION OF TELEMETRY:    ECG:        LABS:                        15.0   6.84  )-----------( 191      ( 2019 06:41 )             46.7     07-23    140  |  101  |  22<H>  ----------------------------<  100<H>  4.1   |  27  |  1.4    Ca    9.6      2019 06:41  Mg     2.2     07-    TPro  7.1  /  Alb  3.9  /  TBili  0.5  /  DBili  x   /  AST  31  /  ALT  41  /  AlkPhos  73  -    CARDIAC MARKERS ( 2019 06:41 )  x     / <0.01 ng/mL / 45 U/L / x     / 1.6 ng/mL  CARDIAC MARKERS ( 2019 21:09 )  x     / <0.01 ng/mL / 36 U/L / x     / 1.3 ng/mL  CARDIAC MARKERS ( 2019 14:38 )  x     / <0.01 ng/mL / x     / x     / x        PT/INR - ( 2019 06:41 )   PT: 35.60 sec;   INR: 3.13 ratio       PTT - ( 2019 06:41 )  PTT:50.0 sec  LIVER FUNCTIONS - ( 2019 06:41 )  Alb: 3.9 g/dL / Pro: 7.1 g/dL / ALK PHOS: 73 U/L / ALT: 41 U/L / AST: 31 U/L / GGT: x           BNPSerum Pro-Brain Natriuretic Peptide: 3080 pg/mL <H> ( @ 14:38)    Daily Height in cm: 165.1 (2019 23:45)    Daily Weight in k.8 (2019 23:45)    RADIOLOGY & ADDITIONAL STUDIES: Patient is a 78y old  Male who presents with a chief complaint of Chest pressure and lightheadedness (2019 19:02)    HPI: 78 year old M with PMH of MI in , systolic HFrEF with EF < 20% s/p AICD, COPD on home oxygen, (rheumatic heart disease), bioprosthetic aortic valve and per the patient and mechanical mitral valve (mechanical MVR implanted in  at the time of his IWMI and SVG to the RCA.  The MVR was performed at [facility] by Dr. 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 Patient was recently admitted for firing of his AICD in 2019, at the time it was upgraded to a BiV reprogrammed at the time to STIMULATE FROM ELECTRODE 2/3 THRESOLD 1.75 @ 0.5 msec  with  no evidence of diaphragmaic stimulaion at a rate to 85/min then 90/min per the patient in the clinic.     Current history goes back to the day of presentation when the patient had an episode of lightheadedness as the patient was walking at home which required him to lie down with resolution. There was no syncope or presyncope, there was no palpitations, no headaches, no vision changes and no shortness of breath. Patient has been feeling lightheaded occasionally particularly on standing up, and while shaving around his neck. He has been constipated however episode was not related to straining. He endorses nausea as well in the morning which he attributes to medication side effect.   He also endorses chest pressure, not related to exertion or rest, band like at the epigastric level, which improved with maalox and leon tea and burping. It is not associated with diaphoresis and does not attribute this to his heart.     In the ED, vitals were stable. Labs showed negative cardiac enzymes and BNP of 3000 (2000 in 3/2019 and 3000 in 2019) (2019 19:02)      PAST MEDICAL & SURGICAL HISTORY:  CAD (coronary artery disease)  Hyperlipidemia, unspecified hyperlipidemia type  Other irritable bowel syndrome  Former smoker, stopped smoking many years ago  Cancer: Basal Cell / Squamous Cell  Osteoarthritis  ICD (implantable cardioverter-defibrillator) in place  Chronic systolic congestive heart failure  MI, old: MI / Cardiac Arrest   ROMERO (dyspnea on exertion)  Chronic obstructive pulmonary disease, unspecified COPD type  S/P CABG x 1  H/O squamous cell carcinoma excision: BELOW LEFT EYE  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 Left ventricular ejection fraction, by visual estimation, is <20%.   2. Severely decreased global left ventricular systolic function.   3. Mean Gradient across MV is 7.   4. Mild tricuspid regurgitation.   5. Bioprosthesis in the aortic position.   6. Mild to moderate aortic regurgitation.   7. Peak gradient acros AV is 20 with a mean of 12.   8. Pulmonic valve regurgitation.   9. Dilatation of the ascending aorta and aortic root.      CATHETERIZATION  FINDINGS: 2016 1VD 100% RCA, patent SVG to RCA, normal LM, minor disease in LAD and left Circ    STRESS  FINDINGS:  < from: NM Nuclear Stress Pharmacologic Multiple (19 @ 17:02) >  Impression:   1.   No definite evidence for ischemia during adenosine infusion as   described above.  2.  Large fixed defect in the inferior wall and inferior septum which   does not thicken consistent with prior injury (scar)  3.  Dilated left ventricle with marked global hypokinesis.  4.  Left ventricular ejection fraction calculated as <20% which is very   low. Wall motion analysis suggests fraction of 20%. Echocardiographic   estimated ejection fraction was <20% on 3/26/2019 and was <20% on   previous myocardial perfusion study of 2015    MEDICATIONS  (STANDING):  amiodarone    Tablet 300 milliGRAM(s) Oral daily  atorvastatin 40 milliGRAM(s) Oral at bedtime  dicyclomine 10 milliGRAM(s) Oral daily  docusate sodium 100 milliGRAM(s) Oral three times a day  furosemide    Tablet 40 milliGRAM(s) Oral daily  magnesium oxide 500 milliGRAM(s) Oral daily  metoprolol tartrate 12.5 milliGRAM(s) Oral two times a day  pantoprazole    Tablet 40 milliGRAM(s) Oral before breakfast  senna 2 Tablet(s) Oral at bedtime  tiotropium 18 MICROgram(s) Capsule 1 Capsule(s) Inhalation daily    MEDICATIONS  (PRN):  ALPRAZolam 0.25 milliGRAM(s) Oral daily PRN anxiety  aluminum hydroxide/magnesium hydroxide/simethicone Suspension 30 milliLiter(s) Oral every 4 hours PRN Dyspepsia   Home Medications:  amiodarone 200 mg oral tablet: 1.5 tab(s) orally once a day start from 19 and continue until you see Dr. Costello (2019 15:37)  dicyclomine 10 mg oral capsule: 1 cap(s) orally once a day (30 Mar 2019 21:43)  furosemide 40 mg oral tablet: 1 tab(s) orally once a day (2019 19:44)  warfarin 2.5 mg oral tablet: 1 tab(s) orally once a day for three days and 1.25 mg on fourth day.  (30 Mar 2019 21:43)      FAMILY HISTORY:  Cancer (Sibling): Brother: Prostate cancer  Family history of heart disease (Sibling): Brother    SOCIAL HISTORY: Ex-smoker quit . No alcohol use. No illicit drug use.    Vital Signs Last 24 Hrs  T(C): 35.9 (2019 05:00), Max: 36.7 (2019 14:41)  T(F): 96.7 (2019 05:00), Max: 98.1 (2019 14:41)  HR: 88 (2019 05:00) (88 - 97)  BP: 123/70 (2019 05:00) (100/73 - 123/70)  BP(mean): --  RR: 20 (2019 05:00) (16 - 20)  SpO2: 98% (2019 19:45) (98% - 98%)          INTERPRETATION OF TELEMETRY:    ECG: < from: 12 Lead ECG (19 @ 07:44) >  Diagnosis Line AV dual-paced rhythm  Abnormal ECG    LABS:                        15.0   6.84  )-----------( 191      ( 2019 06:41 )             46.7     07-    140  |  101  |  22<H>  ----------------------------<  100<H>  4.1   |  27  |  1.4    Ca    9.6      2019 06:41  Mg     2.2         TPro  7.1  /  Alb  3.9  /  TBili  0.5  /  DBili  x   /  AST  31  /  ALT  41  /  AlkPhos  73  07-    CARDIAC MARKERS ( 2019 06:41 )  x     / <0.01 ng/mL / 45 U/L / x     / 1.6 ng/mL  CARDIAC MARKERS ( 2019 21:09 )  x     / <0.01 ng/mL / 36 U/L / x     / 1.3 ng/mL  CARDIAC MARKERS ( 2019 14:38 )  x     / <0.01 ng/mL / x     / x     / x        PT/INR - ( 2019 06:41 )   PT: 35.60 sec;   INR: 3.13 ratio       PTT - ( 2019 06:41 )  PTT:50.0 sec  LIVER FUNCTIONS - ( 2019 06:41 )  Alb: 3.9 g/dL / Pro: 7.1 g/dL / ALK PHOS: 73 U/L / ALT: 41 U/L / AST: 31 U/L / GGT: x           BNPSerum Pro-Brain Natriuretic Peptide: 3080 pg/mL <H> ( @ 14:38)    Daily Height in cm: 165.1 (2019 23:45)    Daily Weight in k.8 (2019 23:45)    RADIOLOGY & ADDITIONAL STUDIES:

## 2019-07-23 NOTE — DISCHARGE NOTE NURSING/CASE MANAGEMENT/SOCIAL WORK - NSDCPEWEB_GEN_ALL_CORE
[facility] for Tobacco Control website --- [email or web address] --- [email or web address]

## 2019-07-24 ENCOUNTER — OUTPATIENT (OUTPATIENT)
Dept: OUTPATIENT SERVICES | Facility: HOSPITAL | Age: 79
LOS: 1 days | Discharge: HOME | End: 2019-07-24

## 2019-07-24 DIAGNOSIS — Z98.890 OTHER SPECIFIED POSTPROCEDURAL STATES: Chronic | ICD-10-CM

## 2019-07-24 DIAGNOSIS — K40.90 UNILATERAL INGUINAL HERNIA, WITHOUT OBSTRUCTION OR GANGRENE, NOT SPECIFIED AS RECURRENT: Chronic | ICD-10-CM

## 2019-07-24 DIAGNOSIS — Z95.2 PRESENCE OF PROSTHETIC HEART VALVE: ICD-10-CM

## 2019-07-24 DIAGNOSIS — Z79.01 LONG TERM (CURRENT) USE OF ANTICOAGULANTS: ICD-10-CM

## 2019-07-24 DIAGNOSIS — S68.119A COMPLETE TRAUMATIC METACARPOPHALANGEAL AMPUTATION OF UNSPECIFIED FINGER, INITIAL ENCOUNTER: Chronic | ICD-10-CM

## 2019-07-24 DIAGNOSIS — Z95.1 PRESENCE OF AORTOCORONARY BYPASS GRAFT: Chronic | ICD-10-CM

## 2019-07-24 LAB
POCT INR: 2.7 RATIO — HIGH (ref 0.9–1.2)
POCT PT: 32 SEC — HIGH (ref 10–13.4)

## 2019-07-30 DIAGNOSIS — Z95.2 PRESENCE OF PROSTHETIC HEART VALVE: ICD-10-CM

## 2019-07-30 DIAGNOSIS — Z87.891 PERSONAL HISTORY OF NICOTINE DEPENDENCE: ICD-10-CM

## 2019-07-30 DIAGNOSIS — F41.9 ANXIETY DISORDER, UNSPECIFIED: ICD-10-CM

## 2019-07-30 DIAGNOSIS — J44.9 CHRONIC OBSTRUCTIVE PULMONARY DISEASE, UNSPECIFIED: ICD-10-CM

## 2019-07-30 DIAGNOSIS — R07.9 CHEST PAIN, UNSPECIFIED: ICD-10-CM

## 2019-07-30 DIAGNOSIS — I71.2 THORACIC AORTIC ANEURYSM, WITHOUT RUPTURE: ICD-10-CM

## 2019-07-30 DIAGNOSIS — Z95.1 PRESENCE OF AORTOCORONARY BYPASS GRAFT: ICD-10-CM

## 2019-07-30 DIAGNOSIS — K59.00 CONSTIPATION, UNSPECIFIED: ICD-10-CM

## 2019-07-30 DIAGNOSIS — I25.5 ISCHEMIC CARDIOMYOPATHY: ICD-10-CM

## 2019-07-30 DIAGNOSIS — M19.90 UNSPECIFIED OSTEOARTHRITIS, UNSPECIFIED SITE: ICD-10-CM

## 2019-07-30 DIAGNOSIS — I47.2 VENTRICULAR TACHYCARDIA: ICD-10-CM

## 2019-07-30 DIAGNOSIS — N18.3 CHRONIC KIDNEY DISEASE, STAGE 3 (MODERATE): ICD-10-CM

## 2019-07-30 DIAGNOSIS — E78.5 HYPERLIPIDEMIA, UNSPECIFIED: ICD-10-CM
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
## 2019-07-30 DIAGNOSIS — I25.10 ATHEROSCLEROTIC HEART DISEASE OF NATIVE CORONARY ARTERY WITHOUT ANGINA PECTORIS: ICD-10-CM

## 2019-07-30 DIAGNOSIS — T82.198A OTHER MECHANICAL COMPLICATION OF OTHER CARDIAC ELECTRONIC DEVICE, INITIAL ENCOUNTER: ICD-10-CM

## 2019-07-30 DIAGNOSIS — Z89.022 ACQUIRED ABSENCE OF LEFT FINGER(S): ICD-10-CM

## 2019-07-30 DIAGNOSIS — Z85.828 PERSONAL HISTORY OF OTHER MALIGNANT NEOPLASM OF SKIN: ICD-10-CM

## 2019-07-30 DIAGNOSIS — I13.0 HYPERTENSIVE HEART AND CHRONIC KIDNEY DISEASE WITH HEART FAILURE AND STAGE 1 THROUGH STAGE 4 CHRONIC KIDNEY DISEASE, OR UNSPECIFIED CHRONIC KIDNEY DISEASE: ICD-10-CM
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
## 2019-07-30 DIAGNOSIS — K58.9 IRRITABLE BOWEL SYNDROME WITHOUT DIARRHEA: ICD-10-CM

## 2019-07-30 DIAGNOSIS — I25.2 OLD MYOCARDIAL INFARCTION: ICD-10-CM

## 2019-07-30 DIAGNOSIS — N28.9 DISORDER OF KIDNEY AND URETER, UNSPECIFIED: ICD-10-CM

## 2019-07-30 DIAGNOSIS — I50.22 CHRONIC SYSTOLIC (CONGESTIVE) HEART FAILURE: ICD-10-CM

## 2019-07-31 ENCOUNTER — OUTPATIENT (OUTPATIENT)
Dept: OUTPATIENT SERVICES | Facility: HOSPITAL | Age: 79
LOS: 1 days | Discharge: HOME | End: 2019-07-31

## 2019-07-31 DIAGNOSIS — Z98.890 OTHER SPECIFIED POSTPROCEDURAL STATES: Chronic | ICD-10-CM

## 2019-07-31 DIAGNOSIS — S68.119A COMPLETE TRAUMATIC METACARPOPHALANGEAL AMPUTATION OF UNSPECIFIED FINGER, INITIAL ENCOUNTER: Chronic | ICD-10-CM

## 2019-07-31 DIAGNOSIS — K40.90 UNILATERAL INGUINAL HERNIA, WITHOUT OBSTRUCTION OR GANGRENE, NOT SPECIFIED AS RECURRENT: Chronic | ICD-10-CM

## 2019-07-31 DIAGNOSIS — Z79.01 LONG TERM (CURRENT) USE OF ANTICOAGULANTS: ICD-10-CM

## 2019-07-31 DIAGNOSIS — Z95.2 PRESENCE OF PROSTHETIC HEART VALVE: ICD-10-CM

## 2019-07-31 DIAGNOSIS — Z95.1 PRESENCE OF AORTOCORONARY BYPASS GRAFT: Chronic | ICD-10-CM

## 2019-07-31 LAB
POCT INR: 2.9 RATIO — HIGH (ref 0.9–1.2)
POCT PT: 34.3 SEC — HIGH (ref 10–13.4)

## 2019-08-05 ENCOUNTER — APPOINTMENT (OUTPATIENT)
Dept: CARDIOLOGY | Facility: CLINIC | Age: 79
End: 2019-08-05
Payer: COMMERCIAL

## 2019-08-05 PROCEDURE — 99213 OFFICE O/P EST LOW 20 MIN: CPT | Mod: 25

## 2019-08-05 PROCEDURE — 93290 INTERROG DEV EVAL ICPMS IP: CPT | Mod: 59

## 2019-08-05 PROCEDURE — 93284 PRGRMG EVAL IMPLANTABLE DFB: CPT | Mod: 59

## 2019-08-15 ENCOUNTER — OUTPATIENT (OUTPATIENT)
Dept: OUTPATIENT SERVICES | Facility: HOSPITAL | Age: 79
LOS: 1 days | Discharge: HOME | End: 2019-08-15

## 2019-08-15 DIAGNOSIS — Z98.890 OTHER SPECIFIED POSTPROCEDURAL STATES: Chronic | ICD-10-CM

## 2019-08-15 DIAGNOSIS — Z79.01 LONG TERM (CURRENT) USE OF ANTICOAGULANTS: ICD-10-CM

## 2019-08-15 DIAGNOSIS — S68.119A COMPLETE TRAUMATIC METACARPOPHALANGEAL AMPUTATION OF UNSPECIFIED FINGER, INITIAL ENCOUNTER: Chronic | ICD-10-CM

## 2019-08-15 DIAGNOSIS — Z95.2 PRESENCE OF PROSTHETIC HEART VALVE: ICD-10-CM

## 2019-08-15 DIAGNOSIS — Z95.1 PRESENCE OF AORTOCORONARY BYPASS GRAFT: Chronic | ICD-10-CM

## 2019-08-15 DIAGNOSIS — K40.90 UNILATERAL INGUINAL HERNIA, WITHOUT OBSTRUCTION OR GANGRENE, NOT SPECIFIED AS RECURRENT: Chronic | ICD-10-CM

## 2019-08-15 LAB
POCT INR: 2.5 RATIO — HIGH (ref 0.9–1.2)
POCT PT: 29.4 SEC — HIGH (ref 10–13.4)

## 2019-08-29 ENCOUNTER — OUTPATIENT (OUTPATIENT)
Dept: OUTPATIENT SERVICES | Facility: HOSPITAL | Age: 79
LOS: 1 days | Discharge: HOME | End: 2019-08-29

## 2019-08-29 DIAGNOSIS — Z95.2 PRESENCE OF PROSTHETIC HEART VALVE: ICD-10-CM

## 2019-08-29 DIAGNOSIS — Z98.890 OTHER SPECIFIED POSTPROCEDURAL STATES: Chronic | ICD-10-CM

## 2019-08-29 DIAGNOSIS — S68.119A COMPLETE TRAUMATIC METACARPOPHALANGEAL AMPUTATION OF UNSPECIFIED FINGER, INITIAL ENCOUNTER: Chronic | ICD-10-CM

## 2019-08-29 DIAGNOSIS — K40.90 UNILATERAL INGUINAL HERNIA, WITHOUT OBSTRUCTION OR GANGRENE, NOT SPECIFIED AS RECURRENT: Chronic | ICD-10-CM

## 2019-08-29 DIAGNOSIS — Z95.1 PRESENCE OF AORTOCORONARY BYPASS GRAFT: Chronic | ICD-10-CM

## 2019-08-29 DIAGNOSIS — Z79.01 LONG TERM (CURRENT) USE OF ANTICOAGULANTS: ICD-10-CM

## 2019-08-29 LAB
POCT INR: 2.4 RATIO — HIGH (ref 0.9–1.2)
POCT PT: 28.4 SEC — HIGH (ref 10–13.4)

## 2019-09-07 NOTE — DISCHARGE NOTE NURSING/CASE MANAGEMENT/SOCIAL WORK - NSDCPEPTCOWADR_GEN_ALL_CORE
Coumadin/Warfarin increases the risk for bleeding. Notify your doctor if you see any bleeding or any of the side effects listed in the Coumadin/Warfarin Booklet. Diet and medications can affect the PT/INR blood level. When Coumadin/Warfarin is taken with other medicines it can change the way other medicines work. Other medicines can also change the way Coumadin/Warfarin works. It is very important to tell your healthcare provider about all of the other medicines, including over-the-counter medications, herbs, diet supplements, or products containing Vitamin K. Call your doctor before starting, stopping, or changing the dose of any prescription or over-the-counter medications.    Any product containing Aspirin lessens the blood's ability to form clots and adds to the effect of Coumadin/Warfarin.    Never take Aspirin without speaking with your health care provider. detailed exam

## 2019-09-12 ENCOUNTER — OUTPATIENT (OUTPATIENT)
Dept: OUTPATIENT SERVICES | Facility: HOSPITAL | Age: 79
LOS: 1 days | Discharge: HOME | End: 2019-09-12

## 2019-09-12 DIAGNOSIS — Z79.01 LONG TERM (CURRENT) USE OF ANTICOAGULANTS: ICD-10-CM

## 2019-09-12 DIAGNOSIS — Z98.890 OTHER SPECIFIED POSTPROCEDURAL STATES: Chronic | ICD-10-CM

## 2019-09-12 DIAGNOSIS — K40.90 UNILATERAL INGUINAL HERNIA, WITHOUT OBSTRUCTION OR GANGRENE, NOT SPECIFIED AS RECURRENT: Chronic | ICD-10-CM

## 2019-09-12 DIAGNOSIS — Z95.2 PRESENCE OF PROSTHETIC HEART VALVE: ICD-10-CM

## 2019-09-12 DIAGNOSIS — S68.119A COMPLETE TRAUMATIC METACARPOPHALANGEAL AMPUTATION OF UNSPECIFIED FINGER, INITIAL ENCOUNTER: Chronic | ICD-10-CM

## 2019-09-12 DIAGNOSIS — Z95.1 PRESENCE OF AORTOCORONARY BYPASS GRAFT: Chronic | ICD-10-CM

## 2019-09-12 LAB
POCT INR: 2.5 RATIO — HIGH (ref 0.9–1.2)
POCT PT: 29.5 SEC — HIGH (ref 10–13.4)

## 2019-09-16 ENCOUNTER — OUTPATIENT (OUTPATIENT)
Dept: OUTPATIENT SERVICES | Facility: HOSPITAL | Age: 79
LOS: 1 days | Discharge: HOME | End: 2019-09-16

## 2019-09-16 ENCOUNTER — LABORATORY RESULT (OUTPATIENT)
Age: 79
End: 2019-09-16

## 2019-09-16 DIAGNOSIS — Z98.890 OTHER SPECIFIED POSTPROCEDURAL STATES: Chronic | ICD-10-CM

## 2019-09-16 DIAGNOSIS — Z95.1 PRESENCE OF AORTOCORONARY BYPASS GRAFT: Chronic | ICD-10-CM

## 2019-09-16 DIAGNOSIS — Z01.810 ENCOUNTER FOR PREPROCEDURAL CARDIOVASCULAR EXAMINATION: ICD-10-CM

## 2019-09-16 DIAGNOSIS — Z79.899 OTHER LONG TERM (CURRENT) DRUG THERAPY: ICD-10-CM

## 2019-09-16 DIAGNOSIS — E78.00 PURE HYPERCHOLESTEROLEMIA, UNSPECIFIED: ICD-10-CM

## 2019-09-16 DIAGNOSIS — S68.119A COMPLETE TRAUMATIC METACARPOPHALANGEAL AMPUTATION OF UNSPECIFIED FINGER, INITIAL ENCOUNTER: Chronic | ICD-10-CM

## 2019-09-16 DIAGNOSIS — K40.90 UNILATERAL INGUINAL HERNIA, WITHOUT OBSTRUCTION OR GANGRENE, NOT SPECIFIED AS RECURRENT: Chronic | ICD-10-CM

## 2019-09-16 DIAGNOSIS — I25.10 ATHEROSCLEROTIC HEART DISEASE OF NATIVE CORONARY ARTERY WITHOUT ANGINA PECTORIS: ICD-10-CM

## 2019-09-19 ENCOUNTER — APPOINTMENT (OUTPATIENT)
Dept: CARDIOLOGY | Facility: CLINIC | Age: 79
End: 2019-09-19
Payer: COMMERCIAL

## 2019-09-19 PROCEDURE — 93000 ELECTROCARDIOGRAM COMPLETE: CPT

## 2019-09-19 PROCEDURE — 99213 OFFICE O/P EST LOW 20 MIN: CPT

## 2019-09-23 ENCOUNTER — APPOINTMENT (OUTPATIENT)
Dept: CARDIOLOGY | Facility: CLINIC | Age: 79
End: 2019-09-23
Payer: COMMERCIAL

## 2019-09-23 PROCEDURE — 99213 OFFICE O/P EST LOW 20 MIN: CPT | Mod: 25

## 2019-09-23 PROCEDURE — 93284 PRGRMG EVAL IMPLANTABLE DFB: CPT | Mod: 59

## 2019-09-23 PROCEDURE — 93290 INTERROG DEV EVAL ICPMS IP: CPT | Mod: 59

## 2019-09-26 ENCOUNTER — OUTPATIENT (OUTPATIENT)
Dept: OUTPATIENT SERVICES | Facility: HOSPITAL | Age: 79
LOS: 1 days | Discharge: HOME | End: 2019-09-26

## 2019-09-26 DIAGNOSIS — Z79.01 LONG TERM (CURRENT) USE OF ANTICOAGULANTS: ICD-10-CM

## 2019-09-26 DIAGNOSIS — Z98.890 OTHER SPECIFIED POSTPROCEDURAL STATES: Chronic | ICD-10-CM

## 2019-09-26 DIAGNOSIS — Z95.2 PRESENCE OF PROSTHETIC HEART VALVE: ICD-10-CM

## 2019-09-26 DIAGNOSIS — S68.119A COMPLETE TRAUMATIC METACARPOPHALANGEAL AMPUTATION OF UNSPECIFIED FINGER, INITIAL ENCOUNTER: Chronic | ICD-10-CM

## 2019-09-26 DIAGNOSIS — Z95.1 PRESENCE OF AORTOCORONARY BYPASS GRAFT: Chronic | ICD-10-CM

## 2019-09-26 DIAGNOSIS — K40.90 UNILATERAL INGUINAL HERNIA, WITHOUT OBSTRUCTION OR GANGRENE, NOT SPECIFIED AS RECURRENT: Chronic | ICD-10-CM

## 2019-09-26 LAB
POCT INR: 3.5 RATIO — HIGH (ref 0.9–1.2)
POCT PT: 41.6 SEC — HIGH (ref 10–13.4)

## 2019-10-17 ENCOUNTER — OUTPATIENT (OUTPATIENT)
Dept: OUTPATIENT SERVICES | Facility: HOSPITAL | Age: 79
LOS: 1 days | Discharge: HOME | End: 2019-10-17

## 2019-10-17 DIAGNOSIS — K40.90 UNILATERAL INGUINAL HERNIA, WITHOUT OBSTRUCTION OR GANGRENE, NOT SPECIFIED AS RECURRENT: Chronic | ICD-10-CM

## 2019-10-17 DIAGNOSIS — Z98.890 OTHER SPECIFIED POSTPROCEDURAL STATES: Chronic | ICD-10-CM

## 2019-10-17 DIAGNOSIS — Z95.2 PRESENCE OF PROSTHETIC HEART VALVE: ICD-10-CM

## 2019-10-17 DIAGNOSIS — Z95.1 PRESENCE OF AORTOCORONARY BYPASS GRAFT: Chronic | ICD-10-CM

## 2019-10-17 DIAGNOSIS — S68.119A COMPLETE TRAUMATIC METACARPOPHALANGEAL AMPUTATION OF UNSPECIFIED FINGER, INITIAL ENCOUNTER: Chronic | ICD-10-CM

## 2019-10-17 DIAGNOSIS — Z79.01 LONG TERM (CURRENT) USE OF ANTICOAGULANTS: ICD-10-CM

## 2019-10-17 LAB
POCT INR: 2.9 RATIO — HIGH (ref 0.9–1.2)
POCT PT: 34.6 SEC — HIGH (ref 10–13.4)

## 2019-11-08 NOTE — ED ADULT NURSE NOTE - CAS EDN INTEG ASSESS
----- Message from Angelito Acosta sent at 11/8/2019  9:59 AM EST -----  Regarding: Dr. Debora Ortiz: [phone]  Caller's first and last name: n/a  Reason for call: Pt had some more questions to ask Dr. Maribell Gould when she called the pt. Mr. Jaylen Barry would like for Dr. Maribell Gould to call back.   Callback required yes/no and why: yes  Best contact number(s): [phone]  Details to clarify the request: n/a WDL

## 2019-11-13 ENCOUNTER — OUTPATIENT (OUTPATIENT)
Dept: OUTPATIENT SERVICES | Facility: HOSPITAL | Age: 79
LOS: 1 days | Discharge: HOME | End: 2019-11-13

## 2019-11-13 DIAGNOSIS — Z98.890 OTHER SPECIFIED POSTPROCEDURAL STATES: Chronic | ICD-10-CM

## 2019-11-13 DIAGNOSIS — Z95.2 PRESENCE OF PROSTHETIC HEART VALVE: ICD-10-CM

## 2019-11-13 DIAGNOSIS — K40.90 UNILATERAL INGUINAL HERNIA, WITHOUT OBSTRUCTION OR GANGRENE, NOT SPECIFIED AS RECURRENT: Chronic | ICD-10-CM

## 2019-11-13 DIAGNOSIS — S68.119A COMPLETE TRAUMATIC METACARPOPHALANGEAL AMPUTATION OF UNSPECIFIED FINGER, INITIAL ENCOUNTER: Chronic | ICD-10-CM

## 2019-11-13 DIAGNOSIS — Z79.01 LONG TERM (CURRENT) USE OF ANTICOAGULANTS: ICD-10-CM

## 2019-11-13 DIAGNOSIS — Z95.1 PRESENCE OF AORTOCORONARY BYPASS GRAFT: Chronic | ICD-10-CM

## 2019-11-13 LAB
POCT INR: 4.5 RATIO — HIGH (ref 0.9–1.2)
POCT PT: 53.7 SEC — HIGH (ref 10–13.4)

## 2019-11-20 ENCOUNTER — OUTPATIENT (OUTPATIENT)
Dept: OUTPATIENT SERVICES | Facility: HOSPITAL | Age: 79
LOS: 1 days | Discharge: HOME | End: 2019-11-20

## 2019-11-20 DIAGNOSIS — Z95.2 PRESENCE OF PROSTHETIC HEART VALVE: ICD-10-CM

## 2019-11-20 DIAGNOSIS — Z98.890 OTHER SPECIFIED POSTPROCEDURAL STATES: Chronic | ICD-10-CM

## 2019-11-20 DIAGNOSIS — Z79.01 LONG TERM (CURRENT) USE OF ANTICOAGULANTS: ICD-10-CM

## 2019-11-20 DIAGNOSIS — Z95.1 PRESENCE OF AORTOCORONARY BYPASS GRAFT: Chronic | ICD-10-CM

## 2019-11-20 DIAGNOSIS — S68.119A COMPLETE TRAUMATIC METACARPOPHALANGEAL AMPUTATION OF UNSPECIFIED FINGER, INITIAL ENCOUNTER: Chronic | ICD-10-CM

## 2019-11-20 DIAGNOSIS — K40.90 UNILATERAL INGUINAL HERNIA, WITHOUT OBSTRUCTION OR GANGRENE, NOT SPECIFIED AS RECURRENT: Chronic | ICD-10-CM

## 2019-11-20 LAB
POCT INR: 2.3 RATIO — HIGH (ref 0.9–1.2)
POCT PT: 27.4 SEC — HIGH (ref 10–13.4)

## 2019-11-27 ENCOUNTER — APPOINTMENT (OUTPATIENT)
Dept: CARDIOLOGY | Facility: CLINIC | Age: 79
End: 2019-11-27
Payer: COMMERCIAL

## 2019-11-27 PROCEDURE — 93290 INTERROG DEV EVAL ICPMS IP: CPT | Mod: 59

## 2019-11-27 PROCEDURE — 93284 PRGRMG EVAL IMPLANTABLE DFB: CPT | Mod: 59

## 2019-11-27 PROCEDURE — 99213 OFFICE O/P EST LOW 20 MIN: CPT | Mod: 25

## 2019-12-04 ENCOUNTER — OUTPATIENT (OUTPATIENT)
Dept: OUTPATIENT SERVICES | Facility: HOSPITAL | Age: 79
LOS: 1 days | Discharge: HOME | End: 2019-12-04

## 2019-12-04 DIAGNOSIS — Z95.1 PRESENCE OF AORTOCORONARY BYPASS GRAFT: Chronic | ICD-10-CM

## 2019-12-04 DIAGNOSIS — Z98.890 OTHER SPECIFIED POSTPROCEDURAL STATES: Chronic | ICD-10-CM

## 2019-12-04 DIAGNOSIS — Z95.2 PRESENCE OF PROSTHETIC HEART VALVE: ICD-10-CM

## 2019-12-04 DIAGNOSIS — S68.119A COMPLETE TRAUMATIC METACARPOPHALANGEAL AMPUTATION OF UNSPECIFIED FINGER, INITIAL ENCOUNTER: Chronic | ICD-10-CM

## 2019-12-04 DIAGNOSIS — Z79.01 LONG TERM (CURRENT) USE OF ANTICOAGULANTS: ICD-10-CM

## 2019-12-04 DIAGNOSIS — K40.90 UNILATERAL INGUINAL HERNIA, WITHOUT OBSTRUCTION OR GANGRENE, NOT SPECIFIED AS RECURRENT: Chronic | ICD-10-CM

## 2019-12-04 LAB
POCT INR: 3 RATIO — HIGH (ref 0.9–1.2)
POCT PT: 35.8 SEC — HIGH (ref 10–13.4)

## 2019-12-31 ENCOUNTER — OUTPATIENT (OUTPATIENT)
Dept: OUTPATIENT SERVICES | Facility: HOSPITAL | Age: 79
LOS: 1 days | Discharge: HOME | End: 2019-12-31

## 2019-12-31 DIAGNOSIS — Z98.890 OTHER SPECIFIED POSTPROCEDURAL STATES: Chronic | ICD-10-CM

## 2019-12-31 DIAGNOSIS — Z79.01 LONG TERM (CURRENT) USE OF ANTICOAGULANTS: ICD-10-CM

## 2019-12-31 DIAGNOSIS — K40.90 UNILATERAL INGUINAL HERNIA, WITHOUT OBSTRUCTION OR GANGRENE, NOT SPECIFIED AS RECURRENT: Chronic | ICD-10-CM

## 2019-12-31 DIAGNOSIS — Z95.1 PRESENCE OF AORTOCORONARY BYPASS GRAFT: Chronic | ICD-10-CM

## 2019-12-31 DIAGNOSIS — S68.119A COMPLETE TRAUMATIC METACARPOPHALANGEAL AMPUTATION OF UNSPECIFIED FINGER, INITIAL ENCOUNTER: Chronic | ICD-10-CM

## 2019-12-31 DIAGNOSIS — Z95.2 PRESENCE OF PROSTHETIC HEART VALVE: ICD-10-CM

## 2019-12-31 LAB
POCT INR: 3.1 RATIO — HIGH (ref 0.9–1.2)
POCT PT: 37.4 SEC — HIGH (ref 10–13.4)

## 2020-01-23 ENCOUNTER — APPOINTMENT (OUTPATIENT)
Dept: CARDIOLOGY | Facility: CLINIC | Age: 80
End: 2020-01-23
Payer: COMMERCIAL

## 2020-01-23 PROCEDURE — 99214 OFFICE O/P EST MOD 30 MIN: CPT

## 2020-01-23 PROCEDURE — 93000 ELECTROCARDIOGRAM COMPLETE: CPT

## 2020-01-29 ENCOUNTER — OUTPATIENT (OUTPATIENT)
Dept: OUTPATIENT SERVICES | Facility: HOSPITAL | Age: 80
LOS: 1 days | Discharge: HOME | End: 2020-01-29

## 2020-01-29 DIAGNOSIS — Z98.890 OTHER SPECIFIED POSTPROCEDURAL STATES: Chronic | ICD-10-CM

## 2020-01-29 DIAGNOSIS — Z79.01 LONG TERM (CURRENT) USE OF ANTICOAGULANTS: ICD-10-CM

## 2020-01-29 DIAGNOSIS — S68.119A COMPLETE TRAUMATIC METACARPOPHALANGEAL AMPUTATION OF UNSPECIFIED FINGER, INITIAL ENCOUNTER: Chronic | ICD-10-CM

## 2020-01-29 DIAGNOSIS — K40.90 UNILATERAL INGUINAL HERNIA, WITHOUT OBSTRUCTION OR GANGRENE, NOT SPECIFIED AS RECURRENT: Chronic | ICD-10-CM

## 2020-01-29 DIAGNOSIS — Z95.1 PRESENCE OF AORTOCORONARY BYPASS GRAFT: Chronic | ICD-10-CM

## 2020-01-29 DIAGNOSIS — Z95.2 PRESENCE OF PROSTHETIC HEART VALVE: ICD-10-CM

## 2020-01-29 LAB
INR PPP: 5.7 RATIO
POCT INR: 5.7 RATIO — CRITICAL HIGH (ref 0.9–1.2)
POCT PT: 68.6 SEC — HIGH (ref 10–13.4)
POCT-PROTHROMBIN TIME: 68.6 SECS

## 2020-02-03 ENCOUNTER — OUTPATIENT (OUTPATIENT)
Dept: OUTPATIENT SERVICES | Facility: HOSPITAL | Age: 80
LOS: 1 days | Discharge: HOME | End: 2020-02-03

## 2020-02-03 DIAGNOSIS — Z98.890 OTHER SPECIFIED POSTPROCEDURAL STATES: Chronic | ICD-10-CM

## 2020-02-03 DIAGNOSIS — Z79.01 LONG TERM (CURRENT) USE OF ANTICOAGULANTS: ICD-10-CM

## 2020-02-03 DIAGNOSIS — S68.119A COMPLETE TRAUMATIC METACARPOPHALANGEAL AMPUTATION OF UNSPECIFIED FINGER, INITIAL ENCOUNTER: Chronic | ICD-10-CM

## 2020-02-03 DIAGNOSIS — Z95.2 PRESENCE OF PROSTHETIC HEART VALVE: ICD-10-CM

## 2020-02-03 DIAGNOSIS — K40.90 UNILATERAL INGUINAL HERNIA, WITHOUT OBSTRUCTION OR GANGRENE, NOT SPECIFIED AS RECURRENT: Chronic | ICD-10-CM

## 2020-02-03 DIAGNOSIS — Z95.1 PRESENCE OF AORTOCORONARY BYPASS GRAFT: Chronic | ICD-10-CM

## 2020-02-03 LAB
INR PPP: 2.8 RATIO
POCT INR: 2.8 RATIO — HIGH (ref 0.9–1.2)
POCT PT: 33.5 SEC — HIGH (ref 10–13.4)
POCT-PROTHROMBIN TIME: 33.5 SECS

## 2020-02-05 ENCOUNTER — INPATIENT (INPATIENT)
Facility: HOSPITAL | Age: 80
LOS: 1 days | Discharge: LEFT BEFORE DISCHARGE | End: 2020-02-07
Attending: HOSPITALIST | Admitting: HOSPITALIST
Payer: COMMERCIAL

## 2020-02-05 VITALS
OXYGEN SATURATION: 100 % | SYSTOLIC BLOOD PRESSURE: 104 MMHG | HEIGHT: 65 IN | HEART RATE: 77 BPM | DIASTOLIC BLOOD PRESSURE: 59 MMHG | TEMPERATURE: 98 F | WEIGHT: 139.99 LBS

## 2020-02-05 DIAGNOSIS — Z95.1 PRESENCE OF AORTOCORONARY BYPASS GRAFT: Chronic | ICD-10-CM

## 2020-02-05 DIAGNOSIS — Z98.890 OTHER SPECIFIED POSTPROCEDURAL STATES: Chronic | ICD-10-CM

## 2020-02-05 DIAGNOSIS — K40.90 UNILATERAL INGUINAL HERNIA, WITHOUT OBSTRUCTION OR GANGRENE, NOT SPECIFIED AS RECURRENT: Chronic | ICD-10-CM

## 2020-02-05 DIAGNOSIS — S68.119A COMPLETE TRAUMATIC METACARPOPHALANGEAL AMPUTATION OF UNSPECIFIED FINGER, INITIAL ENCOUNTER: Chronic | ICD-10-CM

## 2020-02-05 LAB
ALBUMIN SERPL ELPH-MCNC: 3.6 G/DL — SIGNIFICANT CHANGE UP (ref 3.5–5.2)
ALP SERPL-CCNC: 104 U/L — SIGNIFICANT CHANGE UP (ref 30–115)
ALT FLD-CCNC: 37 U/L — SIGNIFICANT CHANGE UP (ref 0–41)
ANION GAP SERPL CALC-SCNC: 10 MMOL/L — SIGNIFICANT CHANGE UP (ref 7–14)
APTT BLD: 27.4 SEC — SIGNIFICANT CHANGE UP (ref 27–39.2)
AST SERPL-CCNC: 53 U/L — HIGH (ref 0–41)
BASE EXCESS BLDV CALC-SCNC: 6.3 MMOL/L — HIGH (ref -2–2)
BASOPHILS # BLD AUTO: 0.02 K/UL — SIGNIFICANT CHANGE UP (ref 0–0.2)
BASOPHILS NFR BLD AUTO: 0.3 % — SIGNIFICANT CHANGE UP (ref 0–1)
BILIRUB DIRECT SERPL-MCNC: 0.3 MG/DL — HIGH (ref 0–0.2)
BILIRUB INDIRECT FLD-MCNC: 0.6 MG/DL — SIGNIFICANT CHANGE UP (ref 0.2–1.2)
BILIRUB SERPL-MCNC: 0.9 MG/DL — SIGNIFICANT CHANGE UP (ref 0.2–1.2)
BUN SERPL-MCNC: 29 MG/DL — HIGH (ref 10–20)
CA-I SERPL-SCNC: 1.15 MMOL/L — SIGNIFICANT CHANGE UP (ref 1.12–1.3)
CALCIUM SERPL-MCNC: 9.1 MG/DL — SIGNIFICANT CHANGE UP (ref 8.5–10.1)
CHLORIDE SERPL-SCNC: 100 MMOL/L — SIGNIFICANT CHANGE UP (ref 98–110)
CO2 SERPL-SCNC: 27 MMOL/L — SIGNIFICANT CHANGE UP (ref 17–32)
CREAT SERPL-MCNC: 1.4 MG/DL — SIGNIFICANT CHANGE UP (ref 0.7–1.5)
EOSINOPHIL # BLD AUTO: 0.01 K/UL — SIGNIFICANT CHANGE UP (ref 0–0.7)
EOSINOPHIL NFR BLD AUTO: 0.1 % — SIGNIFICANT CHANGE UP (ref 0–8)
FLU A RESULT: NEGATIVE — SIGNIFICANT CHANGE UP
FLU A RESULT: NEGATIVE — SIGNIFICANT CHANGE UP
FLUAV AG NPH QL: NEGATIVE — SIGNIFICANT CHANGE UP
FLUBV AG NPH QL: NEGATIVE — SIGNIFICANT CHANGE UP
GAS PNL BLDV: 137 MMOL/L — SIGNIFICANT CHANGE UP (ref 136–145)
GAS PNL BLDV: SIGNIFICANT CHANGE UP
GLUCOSE BLDC GLUCOMTR-MCNC: 247 MG/DL — HIGH (ref 70–99)
GLUCOSE SERPL-MCNC: 95 MG/DL — SIGNIFICANT CHANGE UP (ref 70–99)
HCO3 BLDV-SCNC: 32 MMOL/L — HIGH (ref 22–29)
HCT VFR BLD CALC: 39.6 % — LOW (ref 42–52)
HCT VFR BLDA CALC: 40.1 % — SIGNIFICANT CHANGE UP (ref 34–44)
HGB BLD CALC-MCNC: 13.1 G/DL — LOW (ref 14–18)
HGB BLD-MCNC: 13 G/DL — LOW (ref 14–18)
IMM GRANULOCYTES NFR BLD AUTO: 0.4 % — HIGH (ref 0.1–0.3)
INR BLD: 3 RATIO — HIGH (ref 0.65–1.3)
LACTATE BLDV-MCNC: 1.5 MMOL/L — SIGNIFICANT CHANGE UP (ref 0.5–1.6)
LIDOCAIN IGE QN: 16 U/L — SIGNIFICANT CHANGE UP (ref 7–60)
LYMPHOCYTES # BLD AUTO: 0.52 K/UL — LOW (ref 1.2–3.4)
LYMPHOCYTES # BLD AUTO: 6.9 % — LOW (ref 20.5–51.1)
MAGNESIUM SERPL-MCNC: 2 MG/DL — SIGNIFICANT CHANGE UP (ref 1.8–2.4)
MCHC RBC-ENTMCNC: 30 PG — SIGNIFICANT CHANGE UP (ref 27–31)
MCHC RBC-ENTMCNC: 32.8 G/DL — SIGNIFICANT CHANGE UP (ref 32–37)
MCV RBC AUTO: 91.2 FL — SIGNIFICANT CHANGE UP (ref 80–94)
MONOCYTES # BLD AUTO: 1.02 K/UL — HIGH (ref 0.1–0.6)
MONOCYTES NFR BLD AUTO: 13.6 % — HIGH (ref 1.7–9.3)
NEUTROPHILS # BLD AUTO: 5.91 K/UL — SIGNIFICANT CHANGE UP (ref 1.4–6.5)
NEUTROPHILS NFR BLD AUTO: 78.7 % — HIGH (ref 42.2–75.2)
NRBC # BLD: 0 /100 WBCS — SIGNIFICANT CHANGE UP (ref 0–0)
NT-PROBNP SERPL-SCNC: HIGH PG/ML (ref 0–300)
PCO2 BLDV: 52 MMHG — HIGH (ref 41–51)
PH BLDV: 7.4 — SIGNIFICANT CHANGE UP (ref 7.26–7.43)
PLATELET # BLD AUTO: 167 K/UL — SIGNIFICANT CHANGE UP (ref 130–400)
PO2 BLDV: 21 MMHG — SIGNIFICANT CHANGE UP (ref 20–40)
POTASSIUM BLDV-SCNC: 4.9 MMOL/L — SIGNIFICANT CHANGE UP (ref 3.3–5.6)
POTASSIUM SERPL-MCNC: 5.7 MMOL/L — HIGH (ref 3.5–5)
POTASSIUM SERPL-SCNC: 5.7 MMOL/L — HIGH (ref 3.5–5)
PROT SERPL-MCNC: 6.8 G/DL — SIGNIFICANT CHANGE UP (ref 6–8)
PROTHROM AB SERPL-ACNC: 34.5 SEC — HIGH (ref 9.95–12.87)
RBC # BLD: 4.34 M/UL — LOW (ref 4.7–6.1)
RBC # FLD: 15.6 % — HIGH (ref 11.5–14.5)
RSV RESULT: NEGATIVE — SIGNIFICANT CHANGE UP
RSV RNA RESP QL NAA+PROBE: NEGATIVE — SIGNIFICANT CHANGE UP
SAO2 % BLDV: 30 % — SIGNIFICANT CHANGE UP
SODIUM SERPL-SCNC: 137 MMOL/L — SIGNIFICANT CHANGE UP (ref 135–146)
TROPONIN T SERPL-MCNC: <0.01 NG/ML — SIGNIFICANT CHANGE UP
WBC # BLD: 7.51 K/UL — SIGNIFICANT CHANGE UP (ref 4.8–10.8)
WBC # FLD AUTO: 7.51 K/UL — SIGNIFICANT CHANGE UP (ref 4.8–10.8)

## 2020-02-05 PROCEDURE — 93010 ELECTROCARDIOGRAM REPORT: CPT | Mod: 77

## 2020-02-05 PROCEDURE — 93642 EP EVL 1/2CHMB TRNSVNS CVDFB: CPT | Mod: 26

## 2020-02-05 PROCEDURE — 71045 X-RAY EXAM CHEST 1 VIEW: CPT | Mod: 26

## 2020-02-05 PROCEDURE — 99291 CRITICAL CARE FIRST HOUR: CPT

## 2020-02-05 PROCEDURE — 93010 ELECTROCARDIOGRAM REPORT: CPT

## 2020-02-05 PROCEDURE — 93970 EXTREMITY STUDY: CPT | Mod: 26

## 2020-02-05 RX ORDER — WARFARIN SODIUM 2.5 MG/1
2.5 TABLET ORAL ONCE
Refills: 0 | Status: COMPLETED | OUTPATIENT
Start: 2020-02-05 | End: 2020-02-05

## 2020-02-05 RX ORDER — AMIODARONE HYDROCHLORIDE 400 MG/1
0.5 TABLET ORAL
Qty: 900 | Refills: 0 | Status: DISCONTINUED | OUTPATIENT
Start: 2020-02-06 | End: 2020-02-07

## 2020-02-05 RX ORDER — FUROSEMIDE 40 MG
1 TABLET ORAL
Qty: 0 | Refills: 0 | DISCHARGE

## 2020-02-05 RX ORDER — FUROSEMIDE 40 MG
40 TABLET ORAL ONCE
Refills: 0 | Status: COMPLETED | OUTPATIENT
Start: 2020-02-05 | End: 2020-02-05

## 2020-02-05 RX ORDER — IPRATROPIUM/ALBUTEROL SULFATE 18-103MCG
3 AEROSOL WITH ADAPTER (GRAM) INHALATION
Refills: 0 | Status: COMPLETED | OUTPATIENT
Start: 2020-02-05 | End: 2020-02-05

## 2020-02-05 RX ORDER — AMIODARONE HYDROCHLORIDE 400 MG/1
1 TABLET ORAL
Qty: 900 | Refills: 0 | Status: DISCONTINUED | OUTPATIENT
Start: 2020-02-05 | End: 2020-02-06

## 2020-02-05 RX ORDER — ATORVASTATIN CALCIUM 80 MG/1
40 TABLET, FILM COATED ORAL AT BEDTIME
Refills: 0 | Status: DISCONTINUED | OUTPATIENT
Start: 2020-02-05 | End: 2020-02-07

## 2020-02-05 RX ORDER — AMIODARONE HYDROCHLORIDE 400 MG/1
150 TABLET ORAL ONCE
Refills: 0 | Status: COMPLETED | OUTPATIENT
Start: 2020-02-05 | End: 2020-02-05

## 2020-02-05 RX ORDER — VANCOMYCIN HCL 1 G
1000 VIAL (EA) INTRAVENOUS ONCE
Refills: 0 | Status: COMPLETED | OUTPATIENT
Start: 2020-02-05 | End: 2020-02-05

## 2020-02-05 RX ORDER — IPRATROPIUM/ALBUTEROL SULFATE 18-103MCG
3 AEROSOL WITH ADAPTER (GRAM) INHALATION EVERY 6 HOURS
Refills: 0 | Status: DISCONTINUED | OUTPATIENT
Start: 2020-02-05 | End: 2020-02-07

## 2020-02-05 RX ORDER — CHLORHEXIDINE GLUCONATE 213 G/1000ML
1 SOLUTION TOPICAL
Refills: 0 | Status: DISCONTINUED | OUTPATIENT
Start: 2020-02-05 | End: 2020-02-07

## 2020-02-05 RX ORDER — WARFARIN SODIUM 2.5 MG/1
1 TABLET ORAL
Qty: 0 | Refills: 0 | DISCHARGE

## 2020-02-05 RX ORDER — CEFTRIAXONE 500 MG/1
1000 INJECTION, POWDER, FOR SOLUTION INTRAMUSCULAR; INTRAVENOUS EVERY 24 HOURS
Refills: 0 | Status: DISCONTINUED | OUTPATIENT
Start: 2020-02-06 | End: 2020-02-06

## 2020-02-05 RX ORDER — CEFEPIME 1 G/1
2000 INJECTION, POWDER, FOR SOLUTION INTRAMUSCULAR; INTRAVENOUS ONCE
Refills: 0 | Status: COMPLETED | OUTPATIENT
Start: 2020-02-05 | End: 2020-02-05

## 2020-02-05 RX ORDER — PANTOPRAZOLE SODIUM 20 MG/1
40 TABLET, DELAYED RELEASE ORAL
Refills: 0 | Status: DISCONTINUED | OUTPATIENT
Start: 2020-02-05 | End: 2020-02-07

## 2020-02-05 RX ADMIN — WARFARIN SODIUM 2.5 MILLIGRAM(S): 2.5 TABLET ORAL at 22:04

## 2020-02-05 RX ADMIN — AMIODARONE HYDROCHLORIDE 600 MILLIGRAM(S): 400 TABLET ORAL at 18:40

## 2020-02-05 RX ADMIN — CEFEPIME 100 MILLIGRAM(S): 1 INJECTION, POWDER, FOR SOLUTION INTRAMUSCULAR; INTRAVENOUS at 20:52

## 2020-02-05 RX ADMIN — Medication 125 MILLIGRAM(S): at 16:31

## 2020-02-05 RX ADMIN — Medication 3 MILLILITER(S): at 20:52

## 2020-02-05 RX ADMIN — Medication 250 MILLIGRAM(S): at 18:58

## 2020-02-05 RX ADMIN — ATORVASTATIN CALCIUM 40 MILLIGRAM(S): 80 TABLET, FILM COATED ORAL at 22:05

## 2020-02-05 RX ADMIN — AMIODARONE HYDROCHLORIDE 33.33 MG/MIN: 400 TABLET ORAL at 19:00

## 2020-02-05 RX ADMIN — Medication 40 MILLIGRAM(S): at 16:31

## 2020-02-05 NOTE — H&P ADULT - ATTENDING COMMENTS
Patient seen and examined independently.     # V tach--> now in NSR  - In ER, device was interrogated and pt was found to have multiple episodes of NSVT. EKG showed wide complext tachycardia consistent with Vtach.   - EP eval:  overdriving of the device  successful conversion to sinus rhythm. Pt was started on amiodarone.  - Troponin T, Serum: <0.01 ng/mL (02.06.20 @ 04:49)  -  12 Lead ECG (02.05.20 @ 17:59) >V dual-paced rhythm with frequent ventricular-paced complexes. Abnormal ECG  - Transthoracic Echocardiogram (03.26.19 @ 09:17) >ejection fraction, by visual estimation, is <20%.Bioprosthesis in the aortic position.Mild to moderate aortic regurgitation.  - after  Amio drip, switch back to Amio  mg Q12h  - Pt with coumadin, monitor INR    # Acute on chr Systolic CHF, H/o ischemic Cardiomyopathy S/p BIV, CAD S/p CABG, S/p MVR, TAVR  - monitor I/o, daily weight, restrict fluids  -c/w coumadin, statin  - lasix held for hypotension    # COPD  - Xray Chest 1 View- PORTABLE-Routine (02.05.20 @ 14:57) >ew moderate left pleural effusion/opacity. Hazy opacity in the right lung base.  - F/u /RSV neg  - pt not wheezing: Discontinue IV solumedrol  - Pt afebrile, normal wbc, no evidence of infection, discontinue antibiotics    # Full code    #Pending: clinical improvement  # Discussed plan of care with patient and family  # disposition: Home when stable Patient seen and examined independently.     # V tach--> now in NSR  - In ER, device was interrogated and pt was found to have multiple episodes of NSVT. EKG showed wide complext tachycardia consistent with Vtach.   - EP eval:  overdriving of the device  successful conversion to sinus rhythm. Pt was started on amiodarone.  - Troponin T, Serum: <0.01 ng/mL (02.06.20 @ 04:49)  -  12 Lead ECG (02.05.20 @ 17:59) >V dual-paced rhythm with frequent ventricular-paced complexes. Abnormal ECG  - Transthoracic Echocardiogram (03.26.19 @ 09:17) >ejection fraction, by visual estimation, is <20%.Bioprosthesis in the aortic position.Mild to moderate aortic regurgitation.  - after  Amio drip, switch back to Amio  mg Q12h  - Pt with coumadin, monitor INR    # Acute on chr Systolic CHF, H/o ischemic Cardiomyopathy S/p BIV, CAD S/p CABG, S/p MVR, TAVR  - monitor I/o, daily weight, restrict fluids  -c/w coumadin, statin  S/p IV lasix   - lasix held now  for hypotension    # COPD  - Xray Chest 1 View- PORTABLE-Routine (02.05.20 @ 14:57) >ew moderate left pleural effusion/opacity. Hazy opacity in the right lung base.  - F/u /RSV neg  - pt not wheezing: Discontinue IV solumedrol  - Pt afebrile, normal wbc, no evidence of infection, discontinue antibiotics  - C/w Duoneb    # Full code    #Pending: clinical improvement  # Discussed plan of care with patient and family  # disposition: Home when stable

## 2020-02-05 NOTE — ED ADULT TRIAGE NOTE - NS ED TRIAGE AVPU SCALE
Attempted 3 times to call dr. Nimisha Connell office for fax number. left message on nurses answering machine about positive cultures and needing to fax results.  awaiting for call back     Freddie Rowe RN  05/20/19 2504 Rene Mosher RN  05/20/19 4016
Bed: 28  Expected date:   Expected time:   Means of arrival:   Comments:   Thank you     Shahab Shaw RN  05/17/19 1010
Pelvic exam completed with md Flores Wall, RN  05/17/19 4337
Pt was signed out to me by dr lima and dr Savanna Boles, dr Savanna Boles noted pelvic was unremarkable except for white disxcharge and he noted no cerviscitis, no evidence of friablecervix, purulence draionage, bleeding, vaginal trauma or post operative abscess. Pt is 8 mos post hysterectomy in [place]. Labs stable, pt vs stable, pt has inflammatory changes in lower pelvis, no free air or abscess. D/w dr Rosita Barbour on call for dr Sterling Alicia who pt is suppose to see Monday, she wants antibiotics and f/u m,onday. They did not feel admission was warranted at this time.  Pt and spouse was given warning signs for when to return to the emergency room     Manus Flatness, DO  05/17/19 3019
Pt. Called back to ED asking if we called her, this nurse spoke with her was informed that she she was not feeling much better but did have a follow up with Dr. Sugar Paz.      Ondina Zee RN  05/19/19 9105
This nurse attempted to give tylenol 1,000mg as ordered and patient and her  refused the medication. Warm blanket applied to abd/pelvic area for comfort.      Alfredito Dutton RN  05/17/19 4583
Alert-The patient is alert, awake and responds to voice. The patient is oriented to time, place, and person. The triage nurse is able to obtain subjective information.

## 2020-02-05 NOTE — ED PROVIDER NOTE - NS ED ROS FT
Constitutional:  no fevers, no chills, no malaise  Eyes:  No visual changes  ENMT: No neck pain or stiffness, no nasal congestion, no ear pain, no throat pain  Cardiac:  see hpi  Respiratory:  see hpi  GI:  No nausea, vomiting, diarrhea or abdominal pain.  :  No dysuria, frequency or burning.  MS:  No back pain, no joint pain.  Neuro:  No headache, no dizziness, no change in mental status  Skin:  No skin rash  Except as documented in the HPI,  all other systems are negative

## 2020-02-05 NOTE — PATIENT PROFILE ADULT - STATED REASON FOR ADMISSION
Chest pressure rated 5 out of 10 on a scale of 1-10. Patient also complained of bilateral lower extremity edema.

## 2020-02-05 NOTE — ED PROVIDER NOTE - OBJECTIVE STATEMENT
80 y/o male with c/o chest tightness, ROMERO, b/l LE edema.  Pt with h/o CAD s/p CABG, MI, cardiac arrest, cardiomyopathy EF < 20%, s/p biven PPM/AICD, s/p mechanical MVR on coumadin, s/p TAVR, COPD on prn home O2, - pt states he's been having on and off lower chest pressure for the past few days, lasts a few hours and then resolves without intervention.  has also noted b/l LE edema, slightly increased on L, states was worse yesterday than now, wife states she gave him a torsemide pill in addition to his usual lasix dose.  + ROMERO and SOB for the past 1-2 weeks, also comes and goes.  + cough with sputum for 2 days.  no f/c.  no body aches.  no abd pain.  no n/v/d.  no ha/dizziness/loc.  denies any palpitations, no AICD firing.  Follows with Emre for card.

## 2020-02-05 NOTE — ED PROVIDER NOTE - PROGRESS NOTE DETAILS
prelim LE duplex - neg for dvt.  Pt declined nebs. Ekg done upon arrival, however there was a delayed visualization of ekg by me.  EKG reviewed ~ 5:15 pm prelim LE duplex - neg for dvt.  Pt declined nebs.  bnp 10K, given lasix.  trop neg. Ekg done upon arrival, however there was a delayed visualization of ekg by me.  EKG reviewed ~ 5:15 pm,  HR 91, wide complex ventricular rhythm with pacing spikes seen in middle/end of qrs complex.  spoke with RIN Escobedo, pt seen in ER at bedside by Dr. Cloud - ekg c/w V tach, overdrive pacing done with resolution of v tach.  Pt to be admitted to CCU, recommend to load with amio and start drip.  Pt also with productive cough and CXR with LLL infiltrate.  iv abx ordered - cefepime and vanc.  pt endorsed to CCU resident to continue care.

## 2020-02-05 NOTE — H&P ADULT - NSHPPHYSICALEXAM_GEN_ALL_CORE
Vital Signs Last 24 Hrs  T(C): 36.7 (05 Feb 2020 15:00), Max: 36.7 (05 Feb 2020 12:58)  T(F): 98 (05 Feb 2020 15:00), Max: 98 (05 Feb 2020 12:58)  HR: 95 (05 Feb 2020 18:35) (77 - 95)  BP: 98/61 (05 Feb 2020 18:35) (98/61 - 104/59)  BP(mean): --  RR: 16 (05 Feb 2020 18:35) (16 - 19)  SpO2: 94% (05 Feb 2020 18:35) (94% - 100%)      PHYSICAL EXAM:    General: Not in distress. Well-developed, speaks in full sentences. AAOx3  HEENT: Atraumatic, normocephalic. Moist mucus membranes. PERRLA.  Cardio: Regular rate and rhythm. S1, S2 appreciated. no murmurs.  Pulm: Bilateral breath sounds. No wheezing, or rhonchi  Abdomen: Soft, non-tender, non-distended. Normoactive bowel sounds.  Extremities: No cyanosis or edema bilaterally. No calf tenderness to palpation.  Neuro: No focal deficits. Motor 5/5 throughout. Sensation intact Vital Signs Last 24 Hrs  T(C): 36.7 (05 Feb 2020 15:00), Max: 36.7 (05 Feb 2020 12:58)  T(F): 98 (05 Feb 2020 15:00), Max: 98 (05 Feb 2020 12:58)  HR: 95 (05 Feb 2020 18:35) (77 - 95)  BP: 98/61 (05 Feb 2020 18:35) (98/61 - 104/59)  BP(mean): --  RR: 16 (05 Feb 2020 18:35) (16 - 19)  SpO2: 94% (05 Feb 2020 18:35) (94% - 100%)      PHYSICAL EXAM:    General: Not in distress. Well-developed, speaks in full sentences. AAOx3  HEENT: Atraumatic, normocephalic. Moist mucus membranes. PERRLA.  Cardio: Regular rate and rhythm. S1, S2 appreciated. no murmurs.  Pulm: bilateral expriatory wheezing.  Abdomen: Soft, non-tender, non-distended. Normoactive bowel sounds.  Extremities: No cyanosis or edema bilaterally. No calf tenderness to palpation.  Neuro: No focal deficits. Motor 5/5 throughout. Sensation intact

## 2020-02-05 NOTE — H&P ADULT - HISTORY OF PRESENT ILLNESS
78 y/o M with PMH of  CAD s/p CABG, Ischemic cardiomyopathy (EF 20%) s/p BIV, MVR (on coumadin), TAVR, COPD presenting with chest tightness.     Pt endorses intermittent chest pain over the past week both on exertion and at rest. His pain sometimes lasts 20 mins, sometimes the entire day, is non-radiating and is pressure-like. He also endorses a productive cough with increased sputum production over the same period.  Pt also endorses bilateral LE edema and increased dyspnea on exertion. He endorses being compliant with his medications, and diet. He follows with Dr. Levin for ehart failure, and Dr. vance for AICD.   He denies any fevers, chills, abdominal pain, palpitations, feeling of AICD firing, syncope, or change in bowel or urinary habits.     In ED, device was interrogated and pt was found to have multiple episodes of NSVT. EKG showed wide complext tachycardia consistent with Vtach. EP saw the pt with overdriving of the device  successful conversion to sinus rhythm. Pt was started on amiodarone.

## 2020-02-05 NOTE — H&P ADULT - ASSESSMENT
Patient is a 79y old  Male who presents with a chief complaint of     HPI:      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  Right inguinal hernia: 2006  H/O surgery to major organs, presenting hazards to health: Cholecystectomy 2017  H/O surgery to heart and great vessels, presenting hazards to health: AVR (2015 / MVR (1995)  H/O surgery to major organs, presenting hazards to health: ICD Implant 2012      SOCIAL HX:   Smoking                         ETOH                            Other    FAMILY HISTORY:  Cancer (Sibling): Brother: Prostate cancer  Family history of heart disease (Sibling): Brother      Review of system:  See HPI    Allergies    No Known Allergies    Intolerances          PHYSICAL EXAM    ICU Vital Signs Last 24 Hrs  T(C): 36.7 (05 Feb 2020 15:00), Max: 36.7 (05 Feb 2020 12:58)  T(F): 98 (05 Feb 2020 15:00), Max: 98 (05 Feb 2020 12:58)  HR: 95 (05 Feb 2020 18:35) (77 - 95)  BP: 98/61 (05 Feb 2020 18:35) (98/61 - 104/59)  BP(mean): --  ABP: --  ABP(mean): --  RR: 16 (05 Feb 2020 18:35) (16 - 19)  SpO2: 94% (05 Feb 2020 18:35) (94% - 100%)    I&O's Detail      General: Comfortable in bed  HEENT:  On NC  Lymph node: No palpable LN             Lungs: CTA  Cardiovascular: RRR, S1S2  Abdomen: BS+ve; soft non tender  Extremities: No LE edema  Skin:  No evident Rash  Neurological:  AAOx3; No focal deficit      LABS:                          13.0   7.51  )-----------( 167      ( 05 Feb 2020 14:25 )             39.6                                               02-05    137  |  100  |  29<H>  ----------------------------<  95  5.7<H>   |  27  |  1.4    Ca    9.1      05 Feb 2020 14:25  Mg     2.0     02-05    TPro  6.8  /  Alb  3.6  /  TBili  0.9  /  DBili  0.3<H>  /  AST  53<H>  /  ALT  37  /  AlkPhos  104  02-05      PT/INR - ( 05 Feb 2020 14:25 )   PT: 34.50 sec;   INR: 3.00 ratio         PTT - ( 05 Feb 2020 14:25 )  PTT:27.4 sec                                           CARDIAC MARKERS ( 05 Feb 2020 14:25 )  x     / <0.01 ng/mL / x     / x     / x                                                LIVER FUNCTIONS - ( 05 Feb 2020 14:25 )  Alb: 3.6 g/dL / Pro: 6.8 g/dL / ALK PHOS: 104 U/L / ALT: 37 U/L / AST: 53 U/L / GGT: x                                                Serum Pro-Brain Natriuretic Peptide: 44995 pg/mL (02-05-20 @ 14:25)      Lactate (02-05-20 @ 14:43): 1.5                                                                                               MEDICATIONS  (STANDING):  aMIOdarone Infusion 1 mG/Min (33.333 mL/Hr) IV Continuous <Continuous>  atorvastatin 40 milliGRAM(s) Oral at bedtime  cefepime   IVPB 2000 milliGRAM(s) IV Intermittent once  dicyclomine 10 milliGRAM(s) Oral daily  warfarin 2.5 milliGRAM(s) Oral once    MEDICATIONS  (PRN):      Radiology: IMPRESSION:    #Vtach  #HFrEF decompensation   #CAP      PLAN:    CNS: Avoid sedatives    HEENT: Oral care    PULMONARY: solumedrol 40mg q12hr. duonebs q6hr    CARDIOVASCULAR: C/w amiodarone drip. F/u EP. Trend trops. Repeat EKG in AM. Lasix 40mg q12hr.     GI: GI prophylaxis.  oral feeds    RENAL: f/u lytes.     INFECTIOUS DISEASE: Rocephin and doxycylcline     HEMATOLOGICAL:  DVT prophylaxis.    ENDOCRINE:  Follow up FS.  Insulin protocol if needed.        DVT px: on coumadin

## 2020-02-05 NOTE — H&P ADULT - NSHPLABSRESULTS_GEN_ALL_CORE
13.0   7.51  )-----------( 167      ( 05 Feb 2020 14:25 )             39.6       02-05    137  |  100  |  29<H>  ----------------------------<  95  5.7<H>   |  27  |  1.4    Ca    9.1      05 Feb 2020 14:25  Mg     2.0     02-05    TPro  6.8  /  Alb  3.6  /  TBili  0.9  /  DBili  0.3<H>  /  AST  53<H>  /  ALT  37  /  AlkPhos  104  02-05                  PT/INR - ( 05 Feb 2020 14:25 )   PT: 34.50 sec;   INR: 3.00 ratio         PTT - ( 05 Feb 2020 14:25 )  PTT:27.4 sec    Lactate Trend      CARDIAC MARKERS ( 05 Feb 2020 14:25 )  x     / <0.01 ng/mL / x     / x     / x            CAPILLARY BLOOD GLUCOSE        RADIOLOGY:    < from: Xray Chest 1 View- PORTABLE-Routine (02.05.20 @ 14:57) >    Impression:        New moderate left pleural effusion/opacity. Hazy opacity in the right lung base.    < end of copied text >

## 2020-02-05 NOTE — ED PROVIDER NOTE - PHYSICAL EXAMINATION
CONSTITUTIONAL: Well-developed; well-nourished; in no acute distress, very well appearing  SKIN: skin exam is warm and dry,  HEAD: Normocephalic; atraumatic.  EYES: PERRL, 3 mm bilateral, no nystagmus, EOM intact; conjunctiva and sclera clear.  ENT: MMM, no nasal congestion  NECK: Supple; non tender.+ full passive ROM in all directions. No JVD  CARD: S1, S2 normal, no murmur  RESP: No resp distress, speaking full sentences, + decreased BS on L, + rhonchi  ABD: soft; non-distended; non-tender. No Rebound, No guarding  EXT: Normal ROM. + 1+ edema b/l ankles, slightly increased to L foot, no calf tenderness/swelling, no erythema, 2+ DP pulses b/l.   NEURO: awake, alert, following commands, oriented, grossly unremarkable. No Focal deficits. GCS 15.   PSYCH: Cooperative, appropriate. CONSTITUTIONAL: Well-developed; well-nourished; in no acute distress, very well appearing  SKIN: skin exam is warm and dry,  HEAD: Normocephalic; atraumatic.  EYES: PERRL, 3 mm bilateral, no nystagmus, EOM intact; conjunctiva and sclera clear.  ENT: MMM, no nasal congestion  NECK: Supple; non tender.+ full passive ROM in all directions. No JVD  CARD: rrr  RESP: No resp distress, speaking full sentences, + decreased BS on L, + rhonchi  ABD: soft; non-distended; non-tender. No Rebound, No guarding  EXT: Normal ROM. + 1+ edema b/l ankles, slightly increased to L foot, no calf tenderness/swelling, no erythema, 2+ DP pulses b/l.   NEURO: awake, alert, following commands, oriented, grossly unremarkable. No Focal deficits. GCS 15.   PSYCH: Cooperative, appropriate.

## 2020-02-05 NOTE — ED PROVIDER NOTE - CARE PLAN
Principal Discharge DX:	Ventricular tachycardia  Secondary Diagnosis:	Cardiomyopathy  Secondary Diagnosis:	Fluid overload  Secondary Diagnosis:	Pneumonia

## 2020-02-06 LAB
ALBUMIN SERPL ELPH-MCNC: 3.5 G/DL — SIGNIFICANT CHANGE UP (ref 3.5–5.2)
ALP SERPL-CCNC: 105 U/L — SIGNIFICANT CHANGE UP (ref 30–115)
ALT FLD-CCNC: 36 U/L — SIGNIFICANT CHANGE UP (ref 0–41)
ANION GAP SERPL CALC-SCNC: 13 MMOL/L — SIGNIFICANT CHANGE UP (ref 7–14)
AST SERPL-CCNC: 36 U/L — SIGNIFICANT CHANGE UP (ref 0–41)
BILIRUB SERPL-MCNC: 0.8 MG/DL — SIGNIFICANT CHANGE UP (ref 0.2–1.2)
BUN SERPL-MCNC: 29 MG/DL — HIGH (ref 10–20)
CALCIUM SERPL-MCNC: 8.8 MG/DL — SIGNIFICANT CHANGE UP (ref 8.5–10.1)
CHLORIDE SERPL-SCNC: 100 MMOL/L — SIGNIFICANT CHANGE UP (ref 98–110)
CO2 SERPL-SCNC: 24 MMOL/L — SIGNIFICANT CHANGE UP (ref 17–32)
CREAT SERPL-MCNC: 1.4 MG/DL — SIGNIFICANT CHANGE UP (ref 0.7–1.5)
GLUCOSE SERPL-MCNC: 166 MG/DL — HIGH (ref 70–99)
HCT VFR BLD CALC: 38.1 % — LOW (ref 42–52)
HGB BLD-MCNC: 12.7 G/DL — LOW (ref 14–18)
INR BLD: 3 RATIO — HIGH (ref 0.65–1.3)
MCHC RBC-ENTMCNC: 30 PG — SIGNIFICANT CHANGE UP (ref 27–31)
MCHC RBC-ENTMCNC: 33.3 G/DL — SIGNIFICANT CHANGE UP (ref 32–37)
MCV RBC AUTO: 89.9 FL — SIGNIFICANT CHANGE UP (ref 80–94)
NRBC # BLD: 0 /100 WBCS — SIGNIFICANT CHANGE UP (ref 0–0)
PLATELET # BLD AUTO: 168 K/UL — SIGNIFICANT CHANGE UP (ref 130–400)
POTASSIUM SERPL-MCNC: 3.9 MMOL/L — SIGNIFICANT CHANGE UP (ref 3.5–5)
POTASSIUM SERPL-SCNC: 3.9 MMOL/L — SIGNIFICANT CHANGE UP (ref 3.5–5)
PROT SERPL-MCNC: 6.4 G/DL — SIGNIFICANT CHANGE UP (ref 6–8)
PROTHROM AB SERPL-ACNC: 34.5 SEC — HIGH (ref 9.95–12.87)
RBC # BLD: 4.24 M/UL — LOW (ref 4.7–6.1)
RBC # FLD: 15.4 % — HIGH (ref 11.5–14.5)
SODIUM SERPL-SCNC: 137 MMOL/L — SIGNIFICANT CHANGE UP (ref 135–146)
TROPONIN T SERPL-MCNC: <0.01 NG/ML — SIGNIFICANT CHANGE UP
TROPONIN T SERPL-MCNC: <0.01 NG/ML — SIGNIFICANT CHANGE UP
WBC # BLD: 6.19 K/UL — SIGNIFICANT CHANGE UP (ref 4.8–10.8)
WBC # FLD AUTO: 6.19 K/UL — SIGNIFICANT CHANGE UP (ref 4.8–10.8)

## 2020-02-06 PROCEDURE — 93010 ELECTROCARDIOGRAM REPORT: CPT

## 2020-02-06 PROCEDURE — 93289 INTERROG DEVICE EVAL HEART: CPT | Mod: 26

## 2020-02-06 PROCEDURE — 99223 1ST HOSP IP/OBS HIGH 75: CPT

## 2020-02-06 PROCEDURE — 99222 1ST HOSP IP/OBS MODERATE 55: CPT

## 2020-02-06 RX ORDER — AMIODARONE HYDROCHLORIDE 400 MG/1
200 TABLET ORAL DAILY
Refills: 0 | Status: DISCONTINUED | OUTPATIENT
Start: 2020-02-06 | End: 2020-02-07

## 2020-02-06 RX ORDER — AMIODARONE HYDROCHLORIDE 400 MG/1
200 TABLET ORAL
Refills: 0 | Status: DISCONTINUED | OUTPATIENT
Start: 2020-02-06 | End: 2020-02-06

## 2020-02-06 RX ORDER — WARFARIN SODIUM 2.5 MG/1
2.5 TABLET ORAL ONCE
Refills: 0 | Status: COMPLETED | OUTPATIENT
Start: 2020-02-06 | End: 2020-02-06

## 2020-02-06 RX ADMIN — Medication 110 MILLIGRAM(S): at 05:33

## 2020-02-06 RX ADMIN — CEFTRIAXONE 100 MILLIGRAM(S): 500 INJECTION, POWDER, FOR SOLUTION INTRAMUSCULAR; INTRAVENOUS at 05:41

## 2020-02-06 RX ADMIN — Medication 3 MILLILITER(S): at 13:02

## 2020-02-06 RX ADMIN — Medication 40 MILLIGRAM(S): at 17:15

## 2020-02-06 RX ADMIN — AMIODARONE HYDROCHLORIDE 200 MILLIGRAM(S): 400 TABLET ORAL at 17:13

## 2020-02-06 RX ADMIN — AMIODARONE HYDROCHLORIDE 16.67 MG/MIN: 400 TABLET ORAL at 00:34

## 2020-02-06 RX ADMIN — ATORVASTATIN CALCIUM 40 MILLIGRAM(S): 80 TABLET, FILM COATED ORAL at 21:02

## 2020-02-06 RX ADMIN — WARFARIN SODIUM 2.5 MILLIGRAM(S): 2.5 TABLET ORAL at 21:02

## 2020-02-06 RX ADMIN — Medication 3 MILLILITER(S): at 20:42

## 2020-02-06 RX ADMIN — CHLORHEXIDINE GLUCONATE 1 APPLICATION(S): 213 SOLUTION TOPICAL at 05:33

## 2020-02-06 RX ADMIN — PANTOPRAZOLE SODIUM 40 MILLIGRAM(S): 20 TABLET, DELAYED RELEASE ORAL at 09:52

## 2020-02-06 RX ADMIN — Medication 40 MILLIGRAM(S): at 05:33

## 2020-02-06 RX ADMIN — Medication 10 MILLIGRAM(S): at 11:47

## 2020-02-06 NOTE — CONSULT NOTE ADULT - SUBJECTIVE AND OBJECTIVE BOX
Patient is a 79y old  Male who presents with a chief complaint of Vtach (2020 19:26)    HPI: 80 y/o M with PMH of  CAD s/p CABG, Ischemic cardiomyopathy (EF 20%) s/p BIV, MVR (on coumadin), TAVR, COPD presenting with chest tightness. Pt endorses intermittent chest pain over the past week both on exertion and at rest. His pain sometimes lasts 20 mins, sometimes the entire day, is non-radiating and is pressure-like. He also endorses a productive cough with increased sputum production over the same period.  Pt also endorses bilateral LE edema and increased dyspnea on exertion. He endorses being compliant with his medications, and diet. He follows with Dr. Levin and Dr. Daily. He denies any fevers, chills, abdominal pain, palpitations, feeling of AICD firing, syncope, or change in bowel or urinary habits.     In ED, pt found to have VT on EKG. He was interrogated by myself and Dr Cloud and overdrive paced out of VT and now in sinus rhythm. Recommended admission to CCU and to start Amio drip.    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 Left ventricular ejection fraction, by visual estimation, is <20%.   2. Severely decreased global left ventricular systolic function.   3. Mean Gradient across MV is 7.   4. Mild tricuspid regurgitation.   5. Bioprosthesis in the aortic position.   6. Mild to moderate aortic regurgitation.   7. Peak gradient acros AV is 20 with a mean of 12.   8. Pulmonic valve regurgitation.   9. Dilatation of the ascending aorta and aortic root.    STRESS  FINDINGS:  NM Nuclear Stress Pharmacologic Multiple (19 @ 17:02)  Impression:   1.   No definite evidence for ischemia during adenosine infusion as   described above.  2.  Large fixed defect in the inferior wall and inferior septum which   does not thicken consistent with prior injury (scar)  3.  Dilated left ventricle with marked global hypokinesis.  4.  Left ventricular ejection fraction calculated as <20% which is very   low. Wall motion analysis suggests fraction of 20%. Echocardiographic   estimated ejection fraction was <20% on 3/26/2019 and was <20% on   previous myocardial perfusion study of 2015    CATHETERIZATION  FINDINGS:    ELECTROPHYSIOLOGY STUDY  FINDINGS:    CAROTID ULTRASOUND:  FINDINGS    VENOUS DUPLEX SCAN:  FINDINGS:    CHEST CT PULMONARY ANGIO with IV Contrast:  FINDINGS:    MEDICATIONS  (STANDING):  albuterol/ipratropium for Nebulization 3 milliLiter(s) Nebulizer every 6 hours  aMIOdarone Infusion 0.5 mG/Min (16.667 mL/Hr) IV Continuous <Continuous>  atorvastatin 40 milliGRAM(s) Oral at bedtime  cefTRIAXone   IVPB 1000 milliGRAM(s) IV Intermittent every 24 hours  chlorhexidine 4% Liquid 1 Application(s) Topical <User Schedule>  dicyclomine 10 milliGRAM(s) Oral daily  doxycycline IVPB 100 milliGRAM(s) IV Intermittent every 12 hours  methylPREDNISolone sodium succinate Injectable 40 milliGRAM(s) IV Push two times a day  pantoprazole    Tablet 40 milliGRAM(s) Oral before breakfast    MEDICATIONS  (PRN):      FAMILY HISTORY:  Cancer (Sibling): Brother: Prostate cancer  Family history of heart disease (Sibling): Brother      SOCIAL HISTORY:    CIGARETTES: No    ALCOHOL: No    Past Surgical History: AICD Placement    Allergies:  No Known Allergies      REVIEW OF SYSTEMS:  CONSTITUTIONAL: No fever, weight loss, chills, shakes, or fatigue  RESPIRATORY: No cough, wheezing, hemoptysis, or shortness of breath  CARDIOVASCULAR: No chest pain, dyspnea, palpitations, dizziness, syncope, paroxysmal nocturnal dyspnea, orthopnea, or arm or leg swelling  GASTROINTESTINAL: No abdominal  or epigastric pain, nausea, vomiting, hematemesis, diarrhea, constipation, melena or bright red blood.  NEUROLOGICAL: No headaches, memory loss, slurred speech, limb weakness, loss of strength, numbness, or tremors  MUSCULOSKELETAL: No joint pain or swelling, muscle, back, or extremity pain      Vital Signs Last 24 Hrs  T(C): 36.1 (2020 08:00), Max: 36.7 (2020 15:00)  T(F): 96.9 (2020 08:00), Max: 98 (2020 15:00)  HR: 94 (2020 08:00) (92 - 95)  BP: 102/68 (2020 08:00) (98/61 - 109/70)  BP(mean): 84 (2020 08:00) (76 - 88)  RR: 32 (2020 08:00) (16 - 32)  SpO2: 97% (2020 08:00) (93% - 100%)    PHYSICAL EXAM:  GENERAL: In no apparent distress, well nourished, and hydrated.  HEAD:  Atraumatic, Normocephalic  EYES: EOMI, PERRLA, conjunctiva and sclera clear  ENMT: Moist mucous membranes  NECK: Supple and normal thyroid.  No JVD  HEART: Regular rate and rhythm; No murmurs, rubs, or gallops.  PULMONARY: Mild rales with BL exp wheeze  ABDOMEN: Soft, Nontender, Nondistended; Bowel sounds present  EXTREMITIES:  2+ Peripheral Pulses, No clubbing, cyanosis, or edema  NEUROLOGICAL: Grossly nonfocal      INTERPRETATION OF TELEMETRY: Paced Rhythm    EC Lead ECG (20 @ 17:59)    Ventricular Rate 90 BPM    Atrial Rate 88 BPM    QRS Duration 242 ms    Q-T Interval 538 ms    QTC Calculation(Bezet) 658 ms    R Axis 171 degrees    T Axis -59 degrees    Diagnosis Line AV dual-paced rhythm with frequent ventricular-paced complexes  Abnormal ECG    Confirmed by Agusto Carrion ([id number]) on 2020 8:11:24 PM    I&O's Detail    2020 07:01  -  2020 07:00  --------------------------------------------------------  IN:    amiodarone Infusion: 116.9 mL    amiodarone Infusion: 166.5 mL    IV PiggyBack: 150 mL    Oral Fluid: 240 mL  Total IN: 673.4 mL    OUT:    Voided: 900 mL  Total OUT: 900 mL    Total NET: -226.6 mL          LABS:                        12.7   6.19  )-----------( 168      ( 2020 04:49 )             38.1     02-06    137  |  100  |  29<H>  ----------------------------<  166<H>  3.9   |  24  |  1.4    Ca    8.8      2020 04:49  Mg     2.0     02-    TPro  6.4  /  Alb  3.5  /  TBili  0.8  /  DBili  x   /  AST  36  /  ALT  36  /  AlkPhos  105  02-06    CARDIAC MARKERS ( 2020 04:49 )  x     / <0.01 ng/mL / x     / x     / x      CARDIAC MARKERS ( 2020 23:45 )  x     / <0.01 ng/mL / x     / x     / x      CARDIAC MARKERS ( 2020 14:25 )  x     / <0.01 ng/mL / x     / x     / x          PT/INR - ( 2020 04:49 )   PT: 34.50 sec;   INR: 3.00 ratio         PTT - ( 2020 14:25 )  PTT:27.4 sec    BNPSerum Pro-Brain Natriuretic Peptide: 11312 pg/mL ( @ 14:25)    I&O's Detail    2020 07:01  -  2020 07:00  --------------------------------------------------------  IN:    amiodarone Infusion: 116.9 mL    amiodarone Infusion: 166.5 mL    IV PiggyBack: 150 mL    Oral Fluid: 240 mL  Total IN: 673.4 mL    OUT:    Voided: 900 mL  Total OUT: 900 mL    Total NET: -226.6 mL        Daily Height in cm: 165.1 (2020 21:18)    Daily Weight in k.9 (2020 06:00)    RADIOLOGY & ADDITIONAL STUDIES:    Xray Chest 1 View- PORTABLE-Routine (20 @ 14:57)  EXAM:  XR CHEST PORTABLE ROUTINE 1V            PROCEDURE DATE:  2020        INTERPRETATION:  Clinical History / Reason for exam: Shortness of breath    Comparison : Chest radiograph 19.    Technique/Positioning: AP portable.    Findings:    Support devices: None.    Cardiac/mediastinum/hilum: Cardiomegaly. Biventricular AICD. Poststernotomy and aortic valve repair    Lung parenchyma/Pleura: New moderate left pleural effusion/opacity. Hazy opacity in the right lung base.    Skeleton/soft tissues: Unremarkable.      Impression:        New moderate left pleural effusion/opacity. Hazy opacity in the right lung base.      VERONICA CABRERA M.D., ATTENDING RADIOLOGIST  This document has been electronically signed. 2020  3:01PM

## 2020-02-06 NOTE — CONSULT NOTE ADULT - ASSESSMENT
Assessment: 80 yo M with HFrEF 20% sp AICD ([employer]), CAD sp CABG, MVR on Coumadin, sp TAVR, admitted for chest pain and found to have VT on EKG. Pt was overdrive paced out of VT while in the ED and now pacing normally and asymptomatic.    Plan:  V Tach  HFrEF 20% sp AICD  CAD sp CABG  TAVR  MVR on Coumadin  - Finish Amio drip then switch back to Amio  mg Q12h  - Check 2D Echo  - Cont Coumadin  - Monitor electrolytes, maintain WNL  - Will speak with cardiologist Dr Levin  - Cont CCU monitoring  - Will follow patient

## 2020-02-06 NOTE — CONSULT NOTE ADULT - ATTENDING COMMENTS
Slow monomorphic VT  Continue Amiodarone IV  Will increase Amiodarone to 400 mg daily  Hold warfarin  Obtain last cath report from [facility]  Cardiology consult for possible cardiac cath

## 2020-02-06 NOTE — PROGRESS NOTE ADULT - SUBJECTIVE AND OBJECTIVE BOX
Hospital Day:  1d    Subjective:    Patient is a 79y old  Male who presents with a chief complaint of Vtach (06 Feb 2020 13:18)    78 y/o M with PMH of  CAD s/p CABG, Ischemic cardiomyopathy (EF 20%) s/p BIV, MVR (on coumadin), TAVR, COPD presenting with chest tightness.     Pt endorses intermittent chest pain over the past week both on exertion and at rest. His pain sometimes lasts 20 mins, sometimes the entire day, is non-radiating and is pressure-like. He also endorses a productive cough with increased sputum production over the same period.  Pt also endorses bilateral LE edema and increased dyspnea on exertion. He endorses being compliant with his medications, and diet. He follows with Dr. Levin for ehart failure, and Dr. vance for AICD.   He denies any fevers, chills, abdominal pain, palpitations, feeling of AICD firing, syncope, or change in bowel or urinary habits.     In ED, device was interrogated and pt was found to have multiple episodes of NSVT. EKG showed wide complext tachycardia consistent with Vtach. EP saw the pt with overdriving of the device  successful conversion to sinus rhythm. Pt was started on amiodarone.     Pt seen and evaluated in the morning of 2/6, sitting upright comfortably, not endorsing any new complaints, states he feels fine.     Past Medical Hx:   CAD (coronary artery disease)  Hyperlipidemia, unspecified hyperlipidemia type  Other irritable bowel syndrome  Former smoker, stopped smoking many years ago  Cancer  Osteoarthritis  ICD (implantable cardioverter-defibrillator) in place  Chronic systolic congestive heart failure  MI, old  ROMERO (dyspnea on exertion)  Chronic obstructive pulmonary disease, unspecified COPD type  Atrial fibrillation, unspecified type    Past Sx:  S/P CABG x 1  H/O squamous cell carcinoma excision  Amputation finger  Right inguinal hernia  H/O surgery to major organs, presenting hazards to health  H/O surgery to heart and great vessels, presenting hazards to health  H/O surgery to major organs, presenting hazards to health    Allergies:  No Known Allergies    Current Meds:   Standng Meds:  albuterol/ipratropium for Nebulization 3 milliLiter(s) Nebulizer every 6 hours  aMIOdarone    Tablet 200 milliGRAM(s) Oral two times a day  aMIOdarone Infusion 0.5 mG/Min (16.667 mL/Hr) IV Continuous <Continuous>  atorvastatin 40 milliGRAM(s) Oral at bedtime  cefTRIAXone   IVPB 1000 milliGRAM(s) IV Intermittent every 24 hours  chlorhexidine 4% Liquid 1 Application(s) Topical <User Schedule>  dicyclomine 10 milliGRAM(s) Oral daily  doxycycline IVPB 100 milliGRAM(s) IV Intermittent every 12 hours  methylPREDNISolone sodium succinate Injectable 40 milliGRAM(s) IV Push two times a day  pantoprazole    Tablet 40 milliGRAM(s) Oral before breakfast  warfarin 2.5 milliGRAM(s) Oral once    PRN Meds:    HOME MEDICATIONS:  amiodarone 200 mg oral tablet: 1.5 tab(s) orally once a day start from 4/8/19 and continue until you see Dr. Costello  dicyclomine 10 mg oral capsule: 1 cap(s) orally once a day  furosemide 40 mg oral tablet: 1 tab(s) orally once a day  warfarin 2.5 mg oral tablet: 1 tab(s) orally once a day for three days and 1.25 mg on fourth day.       Vital Signs:   T(F): 97.4 (02-06-20 @ 12:00), Max: 98 (02-05-20 @ 15:00)  HR: 94 (02-06-20 @ 14:00) (92 - 95)  BP: 90/63 (02-06-20 @ 14:00) (89/67 - 109/70)  RR: 24 (02-06-20 @ 14:00) (16 - 32)  SpO2: 96% (02-06-20 @ 14:00) (93% - 100%)      02-05-20 @ 07:01  -  02-06-20 @ 07:00  --------------------------------------------------------  IN: 673.4 mL / OUT: 900 mL / NET: -226.6 mL    02-06-20 @ 07:01  -  02-06-20 @ 14:34  --------------------------------------------------------  IN: 966.7 mL / OUT: 0 mL / NET: 966.7 mL        Physical Exam:   GENERAL: NAD  HEENT: NCAT  CHEST/LUNG: CTAB  HEART: Regular rate and rhythm; s1 s2 appreciated, No murmurs, rubs, or gallops  ABDOMEN: Soft, Nontender, Nondistended; Bowel sounds present  EXTREMITIES: No LE edema b/l  NERVOUS SYSTEM:  Alert & Oriented X3        Labs:                         12.7   6.19  )-----------( 168      ( 06 Feb 2020 04:49 )             38.1       06 Feb 2020 04:49    137    |  100    |  29     ----------------------------<  166    3.9     |  24     |  1.4      Ca    8.8        06 Feb 2020 04:49  Mg     2.0       05 Feb 2020 14:25    TPro  6.4    /  Alb  3.5    /  TBili  0.8    /  DBili  x      /  AST  36     /  ALT  36     /  AlkPhos  105    06 Feb 2020 04:49       pTT    --             ----< 3.00 INR  (02-06-20 @ 04:49)    34.50        PT    Amylase --, Lipase 16, 02-05-20 @ 14:25      Serum Pro-Brain Natriuretic Peptide: 24080 pg/mL (02-05-20 @ 14:25)    Troponin <0.01, CKMB --, CK -- 02-06-20 @ 04:49  Troponin <0.01, CKMB --, CK -- 02-05-20 @ 23:45  Troponin <0.01, CKMB --, CK -- 02-05-20 @ 14:25                Radiology:     EXAM:  DUPLEX SCAN EXT VEINS LOWER BI            PROCEDURE DATE:  02/05/2020            INTERPRETATION:  Clinical History / Reason for exam: The patient is a 79-year-old male with leg swelling. A venous duplex examination was performed to evaluate the patient for deep venous thrombosis of the lower extremities.    The bilateral common femoral, greater saphenous, superficial femoral, popliteal and lesser saphenous veins were visualized with no evidence of deep venous thrombosis    All veins were fully compressible.  There was presence of spontaneous flow, augmentation with distal compression and phasicity.    The anterior tibial veins were  patent  the posterior tibial veins were patent  and peroneal veins were patent        Impression:    No evidence of deep venous thrombosis in the bilateral lower extremities.        EXAM:  XR CHEST PORTABLE ROUTINE 1V            PROCEDURE DATE:  02/05/2020            INTERPRETATION:  Clinical History / Reason for exam: Shortness of breath    Comparison : Chest radiograph 7/22/19.    Technique/Positioning: AP portable.    Findings:    Support devices: None.    Cardiac/mediastinum/hilum: Cardiomegaly. Biventricular AICD. Poststernotomy and aortic valve repair    Lung parenchyma/Pleura: New moderate left pleural effusion/opacity. Hazy opacity in the right lung base.    Skeleton/soft tissues: Unremarkable.      Impression:        New moderate left pleural effusion/opacity. Hazy opacity in the right lung base.        Assessment and Plan:   79yM pmhx CHF EF 20%, s/p CABG, MVR and TAVR on coumadin presented to ED for chest pain and found to be in VT. Overdrive paced in ED out of VT, now pacing normally and asymptomatic.     #VTach  - Finish amio drip and switch back to Amio 200mg PO Q12hrs.   - f/u 2D echo  - Restart coumadin.       #DVT ppx  Coumadin    #GI ppx  Protonix    #Code Status  Full code    #DISPO  CCU monitoring Hospital Day:  1d    Subjective:    Patient is a 79y old  Male who presents with a chief complaint of Vtach (06 Feb 2020 13:18)    80 y/o M with PMH of  CAD s/p CABG, Ischemic cardiomyopathy (EF 20%) s/p BIV, MVR (on coumadin), TAVR, COPD presenting with chest tightness.     Pt endorses intermittent chest pain over the past week both on exertion and at rest. His pain sometimes lasts 20 mins, sometimes the entire day, is non-radiating and is pressure-like. He also endorses a productive cough with increased sputum production over the same period.  Pt also endorses bilateral LE edema and increased dyspnea on exertion. He endorses being compliant with his medications, and diet. He follows with Dr. Levin for ehart failure, and Dr. vance for AICD.     In ED, device was interrogated and pt was found to have multiple episodes of NSVT. EKG showed wide complext tachycardia consistent with Vtach. EP saw the pt with overdriving of the device  successful conversion to sinus rhythm. Pt was started on amiodarone.     Pt seen and evaluated in the morning of 2/6, sitting upright comfortably, not endorsing any new complaints, states he feels fine.     Past Medical Hx:   CAD (coronary artery disease)  Hyperlipidemia, unspecified hyperlipidemia type  Other irritable bowel syndrome  Former smoker, stopped smoking many years ago  Cancer  Osteoarthritis  ICD (implantable cardioverter-defibrillator) in place  Chronic systolic congestive heart failure  MI, old  ROMERO (dyspnea on exertion)  Chronic obstructive pulmonary disease, unspecified COPD type  Atrial fibrillation, unspecified type    Past Sx:  S/P CABG x 1  H/O squamous cell carcinoma excision  Amputation finger  Right inguinal hernia  H/O surgery to major organs, presenting hazards to health  H/O surgery to heart and great vessels, presenting hazards to health  H/O surgery to major organs, presenting hazards to health    Allergies:  No Known Allergies    Current Meds:   Standng Meds:  albuterol/ipratropium for Nebulization 3 milliLiter(s) Nebulizer every 6 hours  aMIOdarone    Tablet 200 milliGRAM(s) Oral two times a day  aMIOdarone Infusion 0.5 mG/Min (16.667 mL/Hr) IV Continuous <Continuous>  atorvastatin 40 milliGRAM(s) Oral at bedtime  cefTRIAXone   IVPB 1000 milliGRAM(s) IV Intermittent every 24 hours  chlorhexidine 4% Liquid 1 Application(s) Topical <User Schedule>  dicyclomine 10 milliGRAM(s) Oral daily  doxycycline IVPB 100 milliGRAM(s) IV Intermittent every 12 hours  methylPREDNISolone sodium succinate Injectable 40 milliGRAM(s) IV Push two times a day  pantoprazole    Tablet 40 milliGRAM(s) Oral before breakfast  warfarin 2.5 milliGRAM(s) Oral once    PRN Meds:    HOME MEDICATIONS:  amiodarone 200 mg oral tablet: 1.5 tab(s) orally once a day start from 4/8/19 and continue until you see Dr. Costello  dicyclomine 10 mg oral capsule: 1 cap(s) orally once a day  furosemide 40 mg oral tablet: 1 tab(s) orally once a day  warfarin 2.5 mg oral tablet: 1 tab(s) orally once a day for three days and 1.25 mg on fourth day.       Vital Signs:   T(F): 97.4 (02-06-20 @ 12:00), Max: 98 (02-05-20 @ 15:00)  HR: 94 (02-06-20 @ 14:00) (92 - 95)  BP: 90/63 (02-06-20 @ 14:00) (89/67 - 109/70)  RR: 24 (02-06-20 @ 14:00) (16 - 32)  SpO2: 96% (02-06-20 @ 14:00) (93% - 100%)      02-05-20 @ 07:01  -  02-06-20 @ 07:00  --------------------------------------------------------  IN: 673.4 mL / OUT: 900 mL / NET: -226.6 mL    02-06-20 @ 07:01  -  02-06-20 @ 14:34  --------------------------------------------------------  IN: 966.7 mL / OUT: 0 mL / NET: 966.7 mL        Physical Exam:   GENERAL: NAD  HEENT: NCAT  CHEST/LUNG: CTAB  HEART: Regular rate and rhythm; s1 s2 appreciated, No murmurs, rubs, or gallops  ABDOMEN: Soft, Nontender, Nondistended; Bowel sounds present  EXTREMITIES: No LE edema b/l  NERVOUS SYSTEM:  Alert & Oriented X3        Labs:                         12.7   6.19  )-----------( 168      ( 06 Feb 2020 04:49 )             38.1       06 Feb 2020 04:49    137    |  100    |  29     ----------------------------<  166    3.9     |  24     |  1.4      Ca    8.8        06 Feb 2020 04:49  Mg     2.0       05 Feb 2020 14:25    TPro  6.4    /  Alb  3.5    /  TBili  0.8    /  DBili  x      /  AST  36     /  ALT  36     /  AlkPhos  105    06 Feb 2020 04:49       pTT    --             ----< 3.00 INR  (02-06-20 @ 04:49)    34.50        PT    Amylase --, Lipase 16, 02-05-20 @ 14:25      Serum Pro-Brain Natriuretic Peptide: 68726 pg/mL (02-05-20 @ 14:25)    Troponin <0.01, CKMB --, CK -- 02-06-20 @ 04:49  Troponin <0.01, CKMB --, CK -- 02-05-20 @ 23:45  Troponin <0.01, CKMB --, CK -- 02-05-20 @ 14:25                Radiology:     EXAM:  DUPLEX SCAN EXT VEINS LOWER BI            PROCEDURE DATE:  02/05/2020            INTERPRETATION:  Clinical History / Reason for exam: The patient is a 79-year-old male with leg swelling. A venous duplex examination was performed to evaluate the patient for deep venous thrombosis of the lower extremities.    The bilateral common femoral, greater saphenous, superficial femoral, popliteal and lesser saphenous veins were visualized with no evidence of deep venous thrombosis    All veins were fully compressible.  There was presence of spontaneous flow, augmentation with distal compression and phasicity.    The anterior tibial veins were  patent  the posterior tibial veins were patent  and peroneal veins were patent        Impression:    No evidence of deep venous thrombosis in the bilateral lower extremities.        EXAM:  XR CHEST PORTABLE ROUTINE 1V            PROCEDURE DATE:  02/05/2020            INTERPRETATION:  Clinical History / Reason for exam: Shortness of breath    Comparison : Chest radiograph 7/22/19.    Technique/Positioning: AP portable.    Findings:    Support devices: None.    Cardiac/mediastinum/hilum: Cardiomegaly. Biventricular AICD. Poststernotomy and aortic valve repair    Lung parenchyma/Pleura: New moderate left pleural effusion/opacity. Hazy opacity in the right lung base.    Skeleton/soft tissues: Unremarkable.      Impression:        New moderate left pleural effusion/opacity. Hazy opacity in the right lung base.        Assessment and Plan:   79yM pmhx CHF EF 20%, s/p CABG, MVR and TAVR on coumadin presented to ED for chest pain and found to be in VT. Overdrive paced in ED out of VT, now pacing normally and asymptomatic.     #VTach  - Finish amio drip and restart Amio 200mg PO Q12hrs.   - f/u 2D echo    #Pleural effusions / Cough / COPD  - Likely 2/2 fluid overload.   - d/c ceftriaxone, doxy.   - Methylprednisolone BID.   - Duonebs q6hrs PRN.     #s/p TAVR  - Restart coumadin  - INR today 3.0. f/u tomorrow    #LE Edema  - Resolved.   - No evidence of DVT on duplex scan.     #DVT ppx  Coumadin    #GI ppx  Protonix    #Code Status  Full code    #DISPO  CCU monitoring

## 2020-02-07 ENCOUNTER — TRANSCRIPTION ENCOUNTER (OUTPATIENT)
Age: 80
End: 2020-02-07

## 2020-02-07 VITALS
TEMPERATURE: 97 F | SYSTOLIC BLOOD PRESSURE: 90 MMHG | RESPIRATION RATE: 20 BRPM | DIASTOLIC BLOOD PRESSURE: 49 MMHG | OXYGEN SATURATION: 94 % | HEART RATE: 100 BPM

## 2020-02-07 LAB
ALBUMIN SERPL ELPH-MCNC: 3.3 G/DL — LOW (ref 3.5–5.2)
ALP SERPL-CCNC: 108 U/L — SIGNIFICANT CHANGE UP (ref 30–115)
ALT FLD-CCNC: 39 U/L — SIGNIFICANT CHANGE UP (ref 0–41)
ANION GAP SERPL CALC-SCNC: 11 MMOL/L — SIGNIFICANT CHANGE UP (ref 7–14)
APTT BLD: 39.4 SEC — HIGH (ref 27–39.2)
AST SERPL-CCNC: 36 U/L — SIGNIFICANT CHANGE UP (ref 0–41)
BASOPHILS # BLD AUTO: 0.01 K/UL — SIGNIFICANT CHANGE UP (ref 0–0.2)
BASOPHILS NFR BLD AUTO: 0.1 % — SIGNIFICANT CHANGE UP (ref 0–1)
BILIRUB SERPL-MCNC: 0.5 MG/DL — SIGNIFICANT CHANGE UP (ref 0.2–1.2)
BUN SERPL-MCNC: 30 MG/DL — HIGH (ref 10–20)
CALCIUM SERPL-MCNC: 9.2 MG/DL — SIGNIFICANT CHANGE UP (ref 8.5–10.1)
CHLORIDE SERPL-SCNC: 103 MMOL/L — SIGNIFICANT CHANGE UP (ref 98–110)
CO2 SERPL-SCNC: 28 MMOL/L — SIGNIFICANT CHANGE UP (ref 17–32)
CREAT SERPL-MCNC: 1.4 MG/DL — SIGNIFICANT CHANGE UP (ref 0.7–1.5)
EOSINOPHIL # BLD AUTO: 0 K/UL — SIGNIFICANT CHANGE UP (ref 0–0.7)
EOSINOPHIL NFR BLD AUTO: 0 % — SIGNIFICANT CHANGE UP (ref 0–8)
GLUCOSE SERPL-MCNC: 147 MG/DL — HIGH (ref 70–99)
HCT VFR BLD CALC: 38.2 % — LOW (ref 42–52)
HGB BLD-MCNC: 12.4 G/DL — LOW (ref 14–18)
IMM GRANULOCYTES NFR BLD AUTO: 0.8 % — HIGH (ref 0.1–0.3)
INR BLD: 3.31 RATIO — HIGH (ref 0.65–1.3)
LYMPHOCYTES # BLD AUTO: 0.24 K/UL — LOW (ref 1.2–3.4)
LYMPHOCYTES # BLD AUTO: 1.9 % — LOW (ref 20.5–51.1)
MAGNESIUM SERPL-MCNC: 2.4 MG/DL — SIGNIFICANT CHANGE UP (ref 1.8–2.4)
MCHC RBC-ENTMCNC: 29.5 PG — SIGNIFICANT CHANGE UP (ref 27–31)
MCHC RBC-ENTMCNC: 32.5 G/DL — SIGNIFICANT CHANGE UP (ref 32–37)
MCV RBC AUTO: 90.7 FL — SIGNIFICANT CHANGE UP (ref 80–94)
MONOCYTES # BLD AUTO: 0.71 K/UL — HIGH (ref 0.1–0.6)
MONOCYTES NFR BLD AUTO: 5.7 % — SIGNIFICANT CHANGE UP (ref 1.7–9.3)
NEUTROPHILS # BLD AUTO: 11.29 K/UL — HIGH (ref 1.4–6.5)
NEUTROPHILS NFR BLD AUTO: 91.5 % — HIGH (ref 42.2–75.2)
NRBC # BLD: 0 /100 WBCS — SIGNIFICANT CHANGE UP (ref 0–0)
PHOSPHATE SERPL-MCNC: 3.2 MG/DL — SIGNIFICANT CHANGE UP (ref 2.1–4.9)
PLATELET # BLD AUTO: 195 K/UL — SIGNIFICANT CHANGE UP (ref 130–400)
POTASSIUM SERPL-MCNC: 4.3 MMOL/L — SIGNIFICANT CHANGE UP (ref 3.5–5)
POTASSIUM SERPL-SCNC: 4.3 MMOL/L — SIGNIFICANT CHANGE UP (ref 3.5–5)
PROT SERPL-MCNC: 6.3 G/DL — SIGNIFICANT CHANGE UP (ref 6–8)
PROTHROM AB SERPL-ACNC: 38.1 SEC — HIGH (ref 9.95–12.87)
RBC # BLD: 4.21 M/UL — LOW (ref 4.7–6.1)
RBC # FLD: 15.6 % — HIGH (ref 11.5–14.5)
SODIUM SERPL-SCNC: 142 MMOL/L — SIGNIFICANT CHANGE UP (ref 135–146)
WBC # BLD: 12.35 K/UL — HIGH (ref 4.8–10.8)
WBC # FLD AUTO: 12.35 K/UL — HIGH (ref 4.8–10.8)

## 2020-02-07 PROCEDURE — 99238 HOSP IP/OBS DSCHRG MGMT 30/<: CPT

## 2020-02-07 PROCEDURE — 93306 TTE W/DOPPLER COMPLETE: CPT | Mod: 26

## 2020-02-07 PROCEDURE — 93010 ELECTROCARDIOGRAM REPORT: CPT

## 2020-02-07 PROCEDURE — 71045 X-RAY EXAM CHEST 1 VIEW: CPT | Mod: 26

## 2020-02-07 RX ORDER — ATORVASTATIN CALCIUM 80 MG/1
1 TABLET, FILM COATED ORAL
Qty: 0 | Refills: 0 | DISCHARGE
Start: 2020-02-07

## 2020-02-07 RX ORDER — AMIODARONE HYDROCHLORIDE 400 MG/1
2 TABLET ORAL
Qty: 14 | Refills: 0
Start: 2020-02-07

## 2020-02-07 RX ORDER — AMIODARONE HYDROCHLORIDE 400 MG/1
1.5 TABLET ORAL
Qty: 0 | Refills: 0 | DISCHARGE

## 2020-02-07 RX ADMIN — Medication 3 MILLILITER(S): at 08:29

## 2020-02-07 RX ADMIN — Medication 40 MILLIGRAM(S): at 05:29

## 2020-02-07 RX ADMIN — Medication 10 MILLIGRAM(S): at 11:32

## 2020-02-07 RX ADMIN — CHLORHEXIDINE GLUCONATE 1 APPLICATION(S): 213 SOLUTION TOPICAL at 05:29

## 2020-02-07 RX ADMIN — PANTOPRAZOLE SODIUM 40 MILLIGRAM(S): 20 TABLET, DELAYED RELEASE ORAL at 05:28

## 2020-02-07 RX ADMIN — Medication 3 MILLILITER(S): at 14:21

## 2020-02-07 RX ADMIN — AMIODARONE HYDROCHLORIDE 200 MILLIGRAM(S): 400 TABLET ORAL at 05:28

## 2020-02-07 NOTE — CONSULT NOTE ADULT - SUBJECTIVE AND OBJECTIVE BOX
HPI:  78 y/o M with PMH of  CAD s/p CABG, Ischemic cardiomyopathy (EF 20%) s/p BIV, MVR (on coumadin), TAVR, COPD presenting with chest tightness.     Pt endorses intermittent chest pain over the past week both on exertion and at rest. His pain sometimes lasts 20 mins, sometimes the entire day, is non-radiating and is pressure-like. He also endorses a productive cough with increased sputum production over the same period.  Pt also endorses bilateral LE edema and increased dyspnea on exertion. He endorses being compliant with his medications, and diet. He follows with Dr. Levin for ehart failure, and Dr. vance for AICD.   He denies any fevers, chills, abdominal pain, palpitations, feeling of AICD firing, syncope, or change in bowel or urinary habits.     In ED, device was interrogated and pt was found to have multiple episodes of NSVT. EKG showed wide complext tachycardia consistent with Vtach. EP saw the pt with overdriving of the device  successful conversion to sinus rhythm. Pt was started on amiodarone drip. (05 Feb 2020 19:26).    Patient is currently pain-free, not complaining of chest tightness. Ambulating in CCU without chest pain or SOB. Two sets of troponin were negative. Amiodarone drip stopped today and switched to oral amiodarone. Patient feels fine and requesting to leave today.       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  Right inguinal hernia: 2006  H/O surgery to major organs, presenting hazards to health: Cholecystectomy 2017  H/O surgery to heart and great vessels, presenting hazards to health: AVR (2015 / MVR (1995)  H/O surgery to major organs, presenting hazards to health: ICD Implant 2012      FAMILY HISTORY:  FAMILY HISTORY:  Cancer (Sibling): Brother: Prostate cancer  Family history of heart disease (Sibling): Brother      SOCIAL HISTORY:  [-]smoker  [+] Alcohol: one glass of wine per day  [-] Drug    ALLERGIES:  No Known Allergies      MEDICATIONS:  MEDICATIONS  (STANDING):  aMIOdarone    Tablet 200 milliGRAM(s) Oral daily  aMIOdarone Infusion 0.5 mG/Min (16.667 mL/Hr) IV Continuous <Continuous>  atorvastatin 40 milliGRAM(s) Oral at bedtime  chlorhexidine 4% Liquid 1 Application(s) Topical <User Schedule>  dicyclomine 10 milliGRAM(s) Oral daily  methylPREDNISolone sodium succinate Injectable 40 milliGRAM(s) IV Push two times a day  pantoprazole    Tablet 40 milliGRAM(s) Oral before breakfast    MEDICATIONS  (PRN):      HOME MEDICATIONS:  Home Medications:  amiodarone 200 mg oral tablet: 1.5 tab(s) orally once a day start from 4/8/19 and continue until you see Dr. Costello (05 Feb 2020 19:39)  dicyclomine 10 mg oral capsule: 1 cap(s) orally once a day (05 Feb 2020 19:39)  furosemide 40 mg oral tablet: 1 tab(s) orally once a day (05 Feb 2020 19:39)  warfarin 2.5 mg oral tablet: 1 tab(s) orally once a day for three days and 1.25 mg on fourth day.  (05 Feb 2020 19:39)      VITALS:   T(F): 96.9 (02-07 @ 12:00), Max: 98.7 (02-06 @ 16:00)  HR: 98 (02-07 @ 14:00) (68 - 98)  BP: 93/68 (02-07 @ 14:00) (82/53 - 109/70)  BP(mean): 80 (02-07 @ 14:00) (60 - 88)  RR: 18 (02-07 @ 14:00) (16 - 35)  SpO2: 94% (02-07 @ 14:00) (93% - 100%)    I&O's Summary    06 Feb 2020 07:01  -  07 Feb 2020 07:00  --------------------------------------------------------  IN: 866.8 mL / OUT: 925 mL / NET: -58.2 mL    07 Feb 2020 07:01  -  07 Feb 2020 14:46  --------------------------------------------------------  IN: 700 mL / OUT: 225 mL / NET: 475 mL        REVIEW OF SYSTEMS:  CONSTITUTIONAL: No weakness, fevers or chills  EYES: No visual changes  ENT: No vertigo or throat pain   NECK: No pain or stiffness  RESPIRATORY: No cough, wheezing, hemoptysis; No shortness of breath  CARDIOVASCULAR: No chest pain or palpitations. Noted lower extremities edema for the last week.  GASTROINTESTINAL: No abdominal or epigastric pain. No nausea, vomiting, or hematemesis; No diarrhea or constipation. No melena or hematochezia.  GENITOURINARY: No dysuria, frequency or hematuria  NEUROLOGICAL: No numbness or weakness  SKIN: No itching, no rashes  MSK: no    PHYSICAL EXAM:  NEURO: patient is awake , alert and oriented, on room air  GEN: Not in acute distress  NECK: no JVD  LUNGS: Decrease bibasilar air entry  CARDIOVASCULAR: S1/S2 present, RRR , systolic murmur with maximal intensity over left lower sternal border  ABD: Soft, non-tender, non-distended, +BS  EXT: Bilateral pitting edema at the ankle level.   SKIN: Intact    LABS:                        12.4   12.35 )-----------( 195      ( 07 Feb 2020 04:46 )             38.2     02-07    142  |  103  |  30<H>  ----------------------------<  147<H>  4.3   |  28  |  1.4    Ca    9.2      07 Feb 2020 04:46  Phos  3.2     02-07  Mg     2.4     02-07    TPro  6.3  /  Alb  3.3<L>  /  TBili  0.5  /  DBili  x   /  AST  36  /  ALT  39  /  AlkPhos  108  02-07    PT/INR - ( 07 Feb 2020 04:46 )   PT: 38.10 sec;   INR: 3.31 ratio         PTT - ( 07 Feb 2020 04:46 )  PTT:39.4 sec    CARDIAC MARKERS ( 06 Feb 2020 04:49 )  x     / <0.01 ng/mL / x     / x     / x      CARDIAC MARKERS ( 05 Feb 2020 23:45 )  x     / <0.01 ng/mL / x     / x     / x            Troponin trend:            RADIOLOGY:  -CXR:  Xray Chest 1 View- PORTABLE-Routine (02.07.20 @ 04:44) >  IMPRESSION:      Resolving opacity at the right lung base.     Stable left pleural effusion/opacity.      -TTE:    < from: Transthoracic Echocardiogram (03.26.19 @ 09:17) >  Summary:   1. Left ventricular ejection fraction, by visual estimation, is <20%.   2. Severely decreased global left ventricular systolic function.   3. Mean Gradient across MV is 7.   4. Mild tricuspid regurgitation.   5. Bioprosthesis in the aortic position.   6. Mild to moderate aortic regurgitation.   7. Peak gradient acros AV is 20 with a mean of 12.   8. Pulmonic valve regurgitation.   9. Dilatation of the ascending aorta and aortic root.    < end of copied text >    -CCTA:    4/1/2019  1.   No definite evidence for ischemia during adenosine infusion as   described above.  2.  Large fixed defect in the inferior wall and inferior septum which   does not thicken consistent with prior injury (scar)  3.  Dilated left ventricle with marked global hypokinesis.  4.  Left ventricular ejection fraction calculated as <20% which is very   low. Wall motion analysis suggests fraction of 20%. Echocardiographic   estimated ejection fraction was <20% on 3/26/2019 and was <20% on   previous myocardial perfusion study of June 9, 2015    ECG:    < from: 12 Lead ECG (02.07.20 @ 07:50) >  Suspect unspecified pacemaker failure  Ventricular-paced rhythm with frequent AV dual-paced complexes  Abnormal ECG    < end of copied text > HPI:  80 y/o M with PMH of  CAD s/p CABG, Ischemic cardiomyopathy (EF 20%) s/p BIV, MVR (on coumadin), TAVR, COPD presenting with chest tightness.     Pt endorses intermittent chest pain over the past week both on exertion and at rest. His pain sometimes lasts 20 mins, sometimes the entire day, is non-radiating and is pressure-like. He also endorses a productive cough with increased sputum production over the same period.  Pt also endorses bilateral LE edema and increased dyspnea on exertion. He endorses being compliant with his medications, and diet. He follows with Dr. Levin for ehart failure, and Dr. vance for AICD.   He denies any fevers, chills, abdominal pain, palpitations, feeling of AICD firing, syncope, or change in bowel or urinary habits.     In ED, device was interrogated and pt was found to have multiple episodes of NSVT. EKG showed wide complext tachycardia consistent with Vtach. EP saw the pt with overdriving of the device  successful conversion to sinus rhythm. Pt was started on amiodarone drip. (05 Feb 2020 19:26).    Patient is currently pain-free, not complaining of chest tightness. Ambulating in CCU without chest pain or SOB. Two sets of troponin were negative. Amiodarone drip stopped today and switched to oral amiodarone. Patient feels fine and requesting to leave today.       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  Right inguinal hernia: 2006  H/O surgery to major organs, presenting hazards to health: Cholecystectomy 2017  H/O surgery to heart and great vessels, presenting hazards to health: AVR (2015 / MVR (1995)  H/O surgery to major organs, presenting hazards to health: ICD Implant 2012      FAMILY HISTORY:  FAMILY HISTORY:  Cancer (Sibling): Brother: Prostate cancer  Family history of heart disease (Sibling): Brother      SOCIAL HISTORY:  [-]smoker  [+] Alcohol: one glass of wine per day  [-] Drug    ALLERGIES:  No Known Allergies      MEDICATIONS:  MEDICATIONS  (STANDING):  aMIOdarone    Tablet 200 milliGRAM(s) Oral daily  aMIOdarone Infusion 0.5 mG/Min (16.667 mL/Hr) IV Continuous <Continuous>  atorvastatin 40 milliGRAM(s) Oral at bedtime  chlorhexidine 4% Liquid 1 Application(s) Topical <User Schedule>  dicyclomine 10 milliGRAM(s) Oral daily  methylPREDNISolone sodium succinate Injectable 40 milliGRAM(s) IV Push two times a day  pantoprazole    Tablet 40 milliGRAM(s) Oral before breakfast    MEDICATIONS  (PRN):      HOME MEDICATIONS:  Home Medications:  amiodarone 200 mg oral tablet: 1.5 tab(s) orally once a day start from 4/8/19 and continue until you see Dr. Costello (05 Feb 2020 19:39)  dicyclomine 10 mg oral capsule: 1 cap(s) orally once a day (05 Feb 2020 19:39)  furosemide 40 mg oral tablet: 1 tab(s) orally once a day (05 Feb 2020 19:39)  warfarin 2.5 mg oral tablet: 1 tab(s) orally once a day for three days and 1.25 mg on fourth day.  (05 Feb 2020 19:39)      VITALS:   T(F): 96.9 (02-07 @ 12:00), Max: 98.7 (02-06 @ 16:00)  HR: 98 (02-07 @ 14:00) (68 - 98)  BP: 93/68 (02-07 @ 14:00) (82/53 - 109/70)  BP(mean): 80 (02-07 @ 14:00) (60 - 88)  RR: 18 (02-07 @ 14:00) (16 - 35)  SpO2: 94% (02-07 @ 14:00) (93% - 100%)    I&O's Summary    06 Feb 2020 07:01  -  07 Feb 2020 07:00  --------------------------------------------------------  IN: 866.8 mL / OUT: 925 mL / NET: -58.2 mL    07 Feb 2020 07:01  -  07 Feb 2020 14:46  --------------------------------------------------------  IN: 700 mL / OUT: 225 mL / NET: 475 mL        REVIEW OF SYSTEMS:  CONSTITUTIONAL: No weakness, fevers or chills  EYES: No visual changes  ENT: No vertigo or throat pain   NECK: No pain or stiffness  RESPIRATORY: No cough, wheezing, hemoptysis; No shortness of breath  CARDIOVASCULAR: No chest pain or palpitations. Noted lower extremities edema for the last week.  GASTROINTESTINAL: No abdominal or epigastric pain. No nausea, vomiting, or hematemesis; No diarrhea or constipation. No melena or hematochezia.  GENITOURINARY: No dysuria, frequency or hematuria  NEUROLOGICAL: No numbness or weakness  SKIN: No itching, no rashes  MSK: no    PHYSICAL EXAM:  NEURO: patient is awake , alert and oriented, on room air  GEN: Not in acute distress  NECK: no JVD  LUNGS: Decrease bibasilar air entry  CARDIOVASCULAR: S1/S2 present, RRR , systolic murmur with maximal intensity over left lower sternal border  ABD: Soft, non-tender, non-distended, +BS  EXT: Bilateral pitting edema at the ankle level.   SKIN: Intact    LABS:                        12.4   12.35 )-----------( 195      ( 07 Feb 2020 04:46 )             38.2     02-07    142  |  103  |  30<H>  ----------------------------<  147<H>  4.3   |  28  |  1.4    Ca    9.2      07 Feb 2020 04:46  Phos  3.2     02-07  Mg     2.4     02-07    TPro  6.3  /  Alb  3.3<L>  /  TBili  0.5  /  DBili  x   /  AST  36  /  ALT  39  /  AlkPhos  108  02-07    PT/INR - ( 07 Feb 2020 04:46 )   PT: 38.10 sec;   INR: 3.31 ratio         PTT - ( 07 Feb 2020 04:46 )  PTT:39.4 sec    CARDIAC MARKERS ( 06 Feb 2020 04:49 )  x     / <0.01 ng/mL / x     / x     / x      CARDIAC MARKERS ( 05 Feb 2020 23:45 )  x     / <0.01 ng/mL / x     / x     / x            Troponin trend:            RADIOLOGY:  -CXR:  Xray Chest 1 View- PORTABLE-Routine (02.07.20 @ 04:44) >  IMPRESSION:      Resolving opacity at the right lung base.     Stable left pleural effusion/opacity.      -TTE:    < from: Transthoracic Echocardiogram (03.26.19 @ 09:17) >  Summary:   1. Left ventricular ejection fraction, by visual estimation, is <20%.   2. Severely decreased global left ventricular systolic function.   3. Mean Gradient across MV is 7.   4. Mild tricuspid regurgitation.   5. Bioprosthesis in the aortic position.   6. Mild to moderate aortic regurgitation.   7. Peak gradient acros AV is 20 with a mean of 12.   8. Pulmonic valve regurgitation.   9. Dilatation of the ascending aorta and aortic root.    < end of copied text >      Nuclear stress test:  4/1/2019:     1.   No definite evidence for ischemia during adenosine infusion as   described above.  2.  Large fixed defect in the inferior wall and inferior septum which   does not thicken consistent with prior injury (scar)  3.  Dilated left ventricle with marked global hypokinesis.  4.  Left ventricular ejection fraction calculated as <20% which is very   low. Wall motion analysis suggests fraction of 20%. Echocardiographic   estimated ejection fraction was <20% on 3/26/2019 and was <20% on   previous myocardial perfusion study of June 9, 2015    ECG:    < from: 12 Lead ECG (02.07.20 @ 07:50) >  Suspect unspecified pacemaker failure  Ventricular-paced rhythm with frequent AV dual-paced complexes  Abnormal ECG    < end of copied text >

## 2020-02-07 NOTE — DISCHARGE NOTE PROVIDER - NSDCMRMEDTOKEN_GEN_ALL_CORE_FT
amiodarone 200 mg oral tablet: 2 tab(s) orally once a day  start from 4/8/19 and continue until you see Dr. Costello   atorvastatin 40 mg oral tablet: 1 tab(s) orally once a day (at bedtime)  dicyclomine 10 mg oral capsule: 1 cap(s) orally once a day  furosemide 40 mg oral tablet: 1 tab(s) orally once a day  warfarin 2.5 mg oral tablet: 1 tab(s) orally once a day for three days and 1.25 mg on fourth day.

## 2020-02-07 NOTE — DISCHARGE NOTE PROVIDER - NSDCFUSCHEDAPPT_GEN_ALL_CORE_FT
YONATHAN FAGAN ; 03/09/2020 ; [facility] [address] YONATHAN FAGAN ; 03/09/2020 ; [facility] [address] YONATHAN FAGAN ; 03/09/2020 ; [facility] [address] YONATHAN FAGAN ; 03/09/2020 ; [facility] [address]

## 2020-02-07 NOTE — PROGRESS NOTE ADULT - SUBJECTIVE AND OBJECTIVE BOX
Hospital Day:  2d    Subjective:    Patient is a 79y old  Male who presents with a chief complaint of Vtach (07 Feb 2020 14:49)    Pt seen and examined at bedside, walking around room asking to do laps around unit. No overnight events, pt has no new complaints. States he feels great.     Past Medical Hx:   CAD (coronary artery disease)  Hyperlipidemia, unspecified hyperlipidemia type  Other irritable bowel syndrome  Former smoker, stopped smoking many years ago  Cancer  Osteoarthritis  ICD (implantable cardioverter-defibrillator) in place  Chronic systolic congestive heart failure  MI, old  ROMERO (dyspnea on exertion)  Chronic obstructive pulmonary disease, unspecified COPD type  Atrial fibrillation, unspecified type    Past Sx:  S/P CABG x 1  H/O squamous cell carcinoma excision  Amputation finger  Right inguinal hernia  H/O surgery to major organs, presenting hazards to health  H/O surgery to heart and great vessels, presenting hazards to health  H/O surgery to major organs, presenting hazards to health    Allergies:  No Known Allergies    Current Meds:   Standng Meds:  aMIOdarone    Tablet 200 milliGRAM(s) Oral daily  aMIOdarone Infusion 0.5 mG/Min (16.667 mL/Hr) IV Continuous <Continuous>  atorvastatin 40 milliGRAM(s) Oral at bedtime  chlorhexidine 4% Liquid 1 Application(s) Topical <User Schedule>  dicyclomine 10 milliGRAM(s) Oral daily  methylPREDNISolone sodium succinate Injectable 40 milliGRAM(s) IV Push two times a day  pantoprazole    Tablet 40 milliGRAM(s) Oral before breakfast    PRN Meds:    HOME MEDICATIONS:  atorvastatin 40 mg oral tablet: 1 tab(s) orally once a day (at bedtime)  dicyclomine 10 mg oral capsule: 1 cap(s) orally once a day  furosemide 40 mg oral tablet: 1 tab(s) orally once a day  warfarin 2.5 mg oral tablet: 1 tab(s) orally once a day for three days and 1.25 mg on fourth day.       Vital Signs:   T(F): 96.9 (02-07-20 @ 16:00), Max: 98 (02-06-20 @ 20:00)  HR: 100 (02-07-20 @ 16:00) (68 - 100)  BP: 90/49 (02-07-20 @ 16:00) (82/53 - 104/66)  RR: 20 (02-07-20 @ 16:00) (17 - 35)  SpO2: 94% (02-07-20 @ 16:00) (94% - 99%)      02-06-20 @ 07:01  -  02-07-20 @ 07:00  --------------------------------------------------------  IN: 866.8 mL / OUT: 925 mL / NET: -58.2 mL    02-07-20 @ 07:01  -  02-07-20 @ 16:27  --------------------------------------------------------  IN: 700 mL / OUT: 225 mL / NET: 475 mL        Physical Exam:   GENERAL: NAD  HEENT: NCAT  CHEST/LUNG: CTAB  HEART: Regular rate and rhythm; s1 s2 appreciated, No murmurs, rubs, or gallops  ABDOMEN: Soft, Nontender, Nondistended; Bowel sounds present  EXTREMITIES: No LE edema b/l  NERVOUS SYSTEM:  Alert & Oriented X3        Labs:                         12.4   12.35 )-----------( 195      ( 07 Feb 2020 04:46 )             38.2     Neutophil% 91.5, Lymphocyte% 1.9, Monocyte% 5.7, Bands% 0.8 02-07-20 @ 04:46    07 Feb 2020 04:46    142    |  103    |  30     ----------------------------<  147    4.3     |  28     |  1.4      Ca    9.2        07 Feb 2020 04:46  Phos  3.2       07 Feb 2020 04:46  Mg     2.4       07 Feb 2020 04:46    TPro  6.3    /  Alb  3.3    /  TBili  0.5    /  DBili  x      /  AST  36     /  ALT  39     /  AlkPhos  108    07 Feb 2020 04:46       pTT    39.4             ----< 3.31 INR  (02-07-20 @ 04:46)    38.10        PT    Amylase --, Lipase 16, 02-05-20 @ 14:25      Serum Pro-Brain Natriuretic Peptide: 35367 pg/mL (02-05-20 @ 14:25)    Troponin <0.01, CKMB --, CK -- 02-06-20 @ 04:49  Troponin <0.01, CKMB --, CK -- 02-05-20 @ 23:45  Troponin <0.01, CKMB --, CK -- 02-05-20 @ 14:25              Culture - Blood (collected 02-06-20 @ 04:49)  Source: .Blood None  Preliminary Report (02-07-20 @ 14:01):    No growth to date.        Radiology:     EXAM:  XR CHEST PORTABLE ROUTINE 1V            PROCEDURE DATE:  02/07/2020            INTERPRETATION:  CLINICAL HISTORY: Cough.    COMPARISON: 2/5/2020.    TECHNIQUE: Portable frontal chest radiograph. Adequate positioning.    FINDINGS:    Support devices: Stable left ICD. Multiple overlying leads and wires.    Cardiac/mediastinum/hilum: Stable.    Lung parenchyma/Pleura: Resolving opacity at the right lung base. Stable left pleural effusion/opacity. Multiple left-sided skin folds.    Skeleton/soft tissues: Stable.      IMPRESSION:      Resolving opacity at the right lung base.     Stable left pleural effusion/opacity.        Assessment and Plan:     79yM pmhx CHF EF 20%, s/p CABG, MVR and TAVR on coumadin presented to ED for chest pain and found to be in VT. Overdrive paced in ED out of VT, now pacing normally and asymptomatic.     #VTach  - Amio 200mg PO Q12hrs as of yesterday evening when gtt finished.    - 2D echo, EF <20%.     #Pleural effusions / Cough / COPD  - Likely 2/2 fluid overload.   - d/c ceftriaxone, doxy.   - Methylprednisolone BID.   - Duonebs q6hrs PRN.   - Resolving.     #s/p TAVR  - Coumadin restarted.   - INR today 3.31.     #LE Edema  - Resolved.   - No evidence of DVT on duplex scan.     #DVT ppx  Coumadin    #GI ppx  Protonix    #Code Status  Full code    #DISPO  Home

## 2020-02-07 NOTE — DISCHARGE NOTE PROVIDER - PROVIDER TOKENS
PROVIDER:[TOKEN:[[id number]],FOLLOWUP:[1-3 days],ESTABLISHEDPATIENT:[T]] PROVIDER:[TOKEN:[[id number]],FOLLOWUP:[1-3 days],ESTABLISHEDPATIENT:[T]],PROVIDER:[TOKEN:[[id number]]]

## 2020-02-07 NOTE — DISCHARGE NOTE PROVIDER - HOSPITAL COURSE
78 y/o M with PMH of  CAD s/p CABG, Ischemic cardiomyopathy (EF 20%) s/p BIV, MVR (on coumadin), TAVR, COPD presenting with chest tightness.         Pt endorses intermittent chest pain over the past week both on exertion and at rest. His pain sometimes lasts 20 mins, sometimes the entire day, is non-radiating and is pressure-like. He also endorses a productive cough with increased sputum production over the same period.  Pt also endorses bilateral LE edema and increased dyspnea on exertion. He endorses being compliant with his medications, and diet. He follows with Dr. Levin for heart failure, and Dr. vance for AICD.         In ED, device was interrogated and pt was found to have multiple episodes of NSVT. EKG showed wide complext tachycardia consistent with Vtach. EP saw the pt with overdriving of the device  successful conversion to sinus rhythm. Pt was started on amiodarone and abx for presumed pneumonia.         In CCU pt w/ no further symptoms, abx d/c'ed. [facility] does not recommend cath at this time.

## 2020-02-07 NOTE — CONSULT NOTE ADULT - ASSESSMENT
80 y/o M with PMH of  CAD s/p CABG, Ischemic cardiomyopathy (EF 20%) s/p BIV, MVR (on coumadin), TAVR, COPD presenting with chest tightness.     # Slow monomorphic Vtach  # HFrEF 20% sp AICD  # CAD sp CABG  # TAVR  # MVR on Coumadin  - Slow monomorphic VTach: evaluated by EP, patient was overdrive paced out of VT. Started on amiodarone drip and transitioned to amiodarone 400 mg daily.   - Negative adenosine stress test on 4/1/2019  - Patient asymptomatic, pain free after controlling Vtach.   - Continue atorvastatin + lasix + coumadin  - No plans for cardiac cath at this time.     Attending attestation to follow 78 y/o M with PMH of  CAD s/p CABG, Ischemic cardiomyopathy (EF 20%) s/p BIV, MVR (on coumadin), TAVR, COPD presenting with chest tightness.     # Slow monomorphic Vtach  # HFrEF 20% sp AICD  # CAD sp CABG  # TAVR  # MVR on Coumadin  - Slow monomorphic VTach: evaluated by EP, patient was overdrive paced out of VT. Started on amiodarone drip and transitioned to amiodarone 400 mg daily.   - Negative adenosine stress test on 4/1/2019  - Patient asymptomatic, pain free after controlling Vtach.   - Continue atorvastatin + lasix + coumadin  - No plans for cardiac cath at this time.     Attending evaluation to follow

## 2020-02-07 NOTE — DISCHARGE NOTE PROVIDER - NSDCCPCAREPLAN_GEN_ALL_CORE_FT
PRINCIPAL DISCHARGE DIAGNOSIS  Diagnosis: Ventricular tachycardia  Assessment and Plan of Treatment: When you presented to the emergency department, your heart was found to be in a rhythm called ventricular tachycardia. This is not a stable rhythm. Cardiac electrophysiology was called and reset your device and your heart is now in a better rhythm. Please follow up as soon as possible with Dr Mishra and Dr Levin for further evaluation and management. Return to the hospital for any new or concerning symptoms.      SECONDARY DISCHARGE DIAGNOSES  Diagnosis: Pneumonia  Assessment and Plan of Treatment: Not found.    Diagnosis: Fluid overload  Assessment and Plan of Treatment: Resolved.    Diagnosis: Cardiomyopathy  Assessment and Plan of Treatment:

## 2020-02-07 NOTE — DISCHARGE NOTE PROVIDER - CARE PROVIDER_API CALL
Kamryn Fraser)  Cardiology; Internal Medicine  [address] [place] [address]  Phone: [phone]  Fax: [phone]  Established Patient  Follow Up Time: 1-3 days Kamryn Fraser)  Cardiology; Internal Medicine  [address] [place] [address], [address]  [place] [address]  Phone: [phone]  Fax: [phone]  Established Patient  Follow Up Time: 1-3 days    Mario Levin)  Cardiovascular Disease; Internal Medicine; Interventional Cardiology  [address] [place] [address], [facility] [address]  [place] [address]  Phone: [phone]  Fax: [phone]  Follow Up Time:

## 2020-02-07 NOTE — DISCHARGE NOTE PROVIDER - CARE PROVIDERS DIRECT ADDRESSES
[email or web address]

## 2020-02-07 NOTE — CHART NOTE - NSCHARTNOTEFT_GEN_A_CORE
Pt seen earlier and expressed wish to leave. Extensive conversation w/ pt about need to stay until evaluation and management are complete. Pt states this same issue has happened a number of times, and that he does not want to stay when he "can follow up with [facility] on Monday" instead of staying in the hospital all weekend; states he lives 5 min away, will stay home all weekend, and will return if anything feels wrong. Further counseled pt on risks of leaving AMA, including death. Pt verbalizes understanding and repeats back that there is the potential for death, but that he would prefer to die at home and not in a hospital. AMA form signed.

## 2020-02-07 NOTE — DISCHARGE NOTE NURSING/CASE MANAGEMENT/SOCIAL WORK - PATIENT PORTAL LINK FT
You can access the FollowMyHealth Patient Portal offered by [facility] by registering at the following website: [email or web address]. By joining [facility]’s FollowMyHealth portal, you will also be able to view your health information using other applications (apps) compatible with our system.

## 2020-02-11 DIAGNOSIS — Z95.2 PRESENCE OF PROSTHETIC HEART VALVE: ICD-10-CM

## 2020-02-11 DIAGNOSIS — I25.5 ISCHEMIC CARDIOMYOPATHY: ICD-10-CM

## 2020-02-11 DIAGNOSIS — I50.23 ACUTE ON CHRONIC SYSTOLIC (CONGESTIVE) HEART FAILURE: ICD-10-CM

## 2020-02-11 DIAGNOSIS — Z87.891 PERSONAL HISTORY OF NICOTINE DEPENDENCE: ICD-10-CM

## 2020-02-11 DIAGNOSIS — I47.1 SUPRAVENTRICULAR TACHYCARDIA: ICD-10-CM

## 2020-02-11 DIAGNOSIS — J44.9 CHRONIC OBSTRUCTIVE PULMONARY DISEASE, UNSPECIFIED: ICD-10-CM

## 2020-02-11 DIAGNOSIS — Z79.01 LONG TERM (CURRENT) USE OF ANTICOAGULANTS: ICD-10-CM

## 2020-02-11 DIAGNOSIS — I25.2 OLD MYOCARDIAL INFARCTION: ICD-10-CM

## 2020-02-11 DIAGNOSIS — I25.10 ATHEROSCLEROTIC HEART DISEASE OF NATIVE CORONARY ARTERY WITHOUT ANGINA PECTORIS: ICD-10-CM

## 2020-02-11 DIAGNOSIS — Z95.1 PRESENCE OF AORTOCORONARY BYPASS GRAFT: ICD-10-CM

## 2020-02-11 DIAGNOSIS — I47.2 VENTRICULAR TACHYCARDIA: ICD-10-CM

## 2020-02-11 LAB
CULTURE RESULTS: SIGNIFICANT CHANGE UP
SPECIMEN SOURCE: SIGNIFICANT CHANGE UP

## 2020-02-12 ENCOUNTER — APPOINTMENT (OUTPATIENT)
Dept: CARDIOLOGY | Facility: CLINIC | Age: 80
End: 2020-02-12
Payer: COMMERCIAL

## 2020-02-12 PROCEDURE — 93284 PRGRMG EVAL IMPLANTABLE DFB: CPT | Mod: 59

## 2020-02-12 PROCEDURE — 99213 OFFICE O/P EST LOW 20 MIN: CPT | Mod: 25

## 2020-02-12 PROCEDURE — 93290 INTERROG DEV EVAL ICPMS IP: CPT | Mod: 59

## 2020-02-18 ENCOUNTER — OUTPATIENT (OUTPATIENT)
Dept: OUTPATIENT SERVICES | Facility: HOSPITAL | Age: 80
LOS: 1 days | Discharge: HOME | End: 2020-02-18

## 2020-02-18 DIAGNOSIS — Z98.890 OTHER SPECIFIED POSTPROCEDURAL STATES: Chronic | ICD-10-CM

## 2020-02-18 DIAGNOSIS — Z95.2 PRESENCE OF PROSTHETIC HEART VALVE: ICD-10-CM

## 2020-02-18 DIAGNOSIS — Z95.1 PRESENCE OF AORTOCORONARY BYPASS GRAFT: Chronic | ICD-10-CM

## 2020-02-18 DIAGNOSIS — K40.90 UNILATERAL INGUINAL HERNIA, WITHOUT OBSTRUCTION OR GANGRENE, NOT SPECIFIED AS RECURRENT: Chronic | ICD-10-CM

## 2020-02-18 DIAGNOSIS — S68.119A COMPLETE TRAUMATIC METACARPOPHALANGEAL AMPUTATION OF UNSPECIFIED FINGER, INITIAL ENCOUNTER: Chronic | ICD-10-CM

## 2020-02-18 DIAGNOSIS — Z79.01 LONG TERM (CURRENT) USE OF ANTICOAGULANTS: ICD-10-CM

## 2020-02-18 LAB
POCT INR: 6.7 RATIO — CRITICAL HIGH (ref 0.9–1.2)
POCT PT: 80.7 SEC — HIGH (ref 10–13.4)

## 2020-02-20 ENCOUNTER — OUTPATIENT (OUTPATIENT)
Dept: OUTPATIENT SERVICES | Facility: HOSPITAL | Age: 80
LOS: 1 days | Discharge: HOME | End: 2020-02-20

## 2020-02-20 DIAGNOSIS — Z98.890 OTHER SPECIFIED POSTPROCEDURAL STATES: Chronic | ICD-10-CM

## 2020-02-20 DIAGNOSIS — Z95.1 PRESENCE OF AORTOCORONARY BYPASS GRAFT: Chronic | ICD-10-CM

## 2020-02-20 DIAGNOSIS — Z95.2 PRESENCE OF PROSTHETIC HEART VALVE: ICD-10-CM

## 2020-02-20 DIAGNOSIS — Z79.01 LONG TERM (CURRENT) USE OF ANTICOAGULANTS: ICD-10-CM

## 2020-02-20 DIAGNOSIS — K40.90 UNILATERAL INGUINAL HERNIA, WITHOUT OBSTRUCTION OR GANGRENE, NOT SPECIFIED AS RECURRENT: Chronic | ICD-10-CM

## 2020-02-20 DIAGNOSIS — S68.119A COMPLETE TRAUMATIC METACARPOPHALANGEAL AMPUTATION OF UNSPECIFIED FINGER, INITIAL ENCOUNTER: Chronic | ICD-10-CM

## 2020-02-20 LAB
INR PPP: 3 RATIO
POCT INR: 3 RATIO — HIGH (ref 0.9–1.2)
POCT PT: 36.2 SEC — HIGH (ref 10–13.4)
POCT-PROTHROMBIN TIME: 36.2 SECS

## 2020-02-27 ENCOUNTER — OUTPATIENT (OUTPATIENT)
Dept: OUTPATIENT SERVICES | Facility: HOSPITAL | Age: 80
LOS: 1 days | Discharge: HOME | End: 2020-02-27

## 2020-02-27 DIAGNOSIS — Z98.890 OTHER SPECIFIED POSTPROCEDURAL STATES: Chronic | ICD-10-CM

## 2020-02-27 DIAGNOSIS — Z79.01 LONG TERM (CURRENT) USE OF ANTICOAGULANTS: ICD-10-CM

## 2020-02-27 DIAGNOSIS — S68.119A COMPLETE TRAUMATIC METACARPOPHALANGEAL AMPUTATION OF UNSPECIFIED FINGER, INITIAL ENCOUNTER: Chronic | ICD-10-CM

## 2020-02-27 DIAGNOSIS — Z95.2 PRESENCE OF PROSTHETIC HEART VALVE: ICD-10-CM

## 2020-02-27 DIAGNOSIS — K40.90 UNILATERAL INGUINAL HERNIA, WITHOUT OBSTRUCTION OR GANGRENE, NOT SPECIFIED AS RECURRENT: Chronic | ICD-10-CM

## 2020-02-27 DIAGNOSIS — Z95.1 PRESENCE OF AORTOCORONARY BYPASS GRAFT: Chronic | ICD-10-CM

## 2020-02-27 LAB
INR PPP: 2.5 RATIO
POCT INR: 2.5 RATIO — HIGH (ref 0.9–1.2)
POCT PT: 29.8 SEC — HIGH (ref 10–13.4)
POCT-PROTHROMBIN TIME: 29.8 SECS

## 2020-03-09 ENCOUNTER — APPOINTMENT (OUTPATIENT)
Dept: CARDIOLOGY | Facility: CLINIC | Age: 80
End: 2020-03-09
Payer: MEDICARE

## 2020-03-09 PROCEDURE — 93290 INTERROG DEV EVAL ICPMS IP: CPT | Mod: 59

## 2020-03-09 PROCEDURE — 93284 PRGRMG EVAL IMPLANTABLE DFB: CPT | Mod: 59

## 2020-03-09 PROCEDURE — 99213 OFFICE O/P EST LOW 20 MIN: CPT | Mod: 25

## 2020-03-10 ENCOUNTER — OUTPATIENT (OUTPATIENT)
Dept: OUTPATIENT SERVICES | Facility: HOSPITAL | Age: 80
LOS: 1 days | Discharge: HOME | End: 2020-03-10

## 2020-03-10 DIAGNOSIS — Z98.890 OTHER SPECIFIED POSTPROCEDURAL STATES: Chronic | ICD-10-CM

## 2020-03-10 DIAGNOSIS — Z95.2 PRESENCE OF PROSTHETIC HEART VALVE: ICD-10-CM

## 2020-03-10 DIAGNOSIS — Z95.1 PRESENCE OF AORTOCORONARY BYPASS GRAFT: Chronic | ICD-10-CM

## 2020-03-10 DIAGNOSIS — S68.119A COMPLETE TRAUMATIC METACARPOPHALANGEAL AMPUTATION OF UNSPECIFIED FINGER, INITIAL ENCOUNTER: Chronic | ICD-10-CM

## 2020-03-10 DIAGNOSIS — K40.90 UNILATERAL INGUINAL HERNIA, WITHOUT OBSTRUCTION OR GANGRENE, NOT SPECIFIED AS RECURRENT: Chronic | ICD-10-CM

## 2020-03-10 DIAGNOSIS — Z79.01 LONG TERM (CURRENT) USE OF ANTICOAGULANTS: ICD-10-CM

## 2020-03-10 LAB
INR PPP: 3 RATIO
POCT INR: 3 RATIO — HIGH (ref 0.9–1.2)
POCT PT: 36.1 SEC — HIGH (ref 10–13.4)
POCT-PROTHROMBIN TIME: 36.1 SECS

## 2020-03-18 ENCOUNTER — APPOINTMENT (OUTPATIENT)
Dept: CARDIOLOGY | Facility: CLINIC | Age: 80
End: 2020-03-18
Payer: COMMERCIAL

## 2020-03-18 PROCEDURE — 93284 PRGRMG EVAL IMPLANTABLE DFB: CPT | Mod: 59

## 2020-03-18 PROCEDURE — 93290 INTERROG DEV EVAL ICPMS IP: CPT | Mod: 59

## 2020-03-18 PROCEDURE — 99213 OFFICE O/P EST LOW 20 MIN: CPT | Mod: 25

## 2020-04-22 ENCOUNTER — APPOINTMENT (OUTPATIENT)
Dept: CARDIOLOGY | Facility: CLINIC | Age: 80
End: 2020-04-22

## 2020-05-28 ENCOUNTER — APPOINTMENT (OUTPATIENT)
Dept: CARDIOLOGY | Facility: CLINIC | Age: 80
End: 2020-05-28

## 2020-10-30 NOTE — ASU PATIENT PROFILE, ADULT - ALCOHOL USE HISTORY SINGLE SELECT
This is a recent snapshot of the patient's [facility] medical record.  For current drug dose and complete information and questions, call [phone] or In Basket pool, fv home infusion ([id number])  CSN Number:  [id number]      
yes...

## 2021-01-11 NOTE — ED ADULT NURSE NOTE - CHIEF COMPLAINT QUOTE
chest pain that started about 30 minutes ago GENERAL: No fever and no chills  EENT: No sore throat and no dysphagia.  RESPIRATORY: No cough and no SOB.  GI: No abdo pain, N/V/D

## 2021-05-07 NOTE — CONSULT NOTE ADULT - ASSESSMENT
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     Assessment/Plan:   Pt completed IV antibiotics on 5/5  She remains afebrile  She continues on IV steroids in decreasing doses for titrating down - currently receiving IV Dexamethasone 4 mg q 8 hr, then q 12 hr and then daily  Pt continues to have Keofed tube for oropharyngeal dysphagia  Spouse is agreeable to her having a PEG tube inserted which will allow her to have placement in a rehab facility  GI is consulted  She continues on IV fluids  H/H is stable  Still with hyperglycemia        Vital Signs:   05/07/21 07:29:20  99 °F (37 2 °C)  84  15  124/82  96  100 %   05/06/21 22:09:50  97 8 °F (36 6 °C)  81  18  123/82  96  100 %   05/06/21 15:39:02  97 2 °F (36 2 °C)Abnormal   86  18  121/80  94  98 %   05/06/21 07:08:01  98 6 °F (37 °C)  91  --  116/80  92  100 %   05/06/21 02:45:53  98 2 °F (36 8 °C)  82  20  98/61  73  100 %   05/06/21 01:45:19  98 1 °F (36 7 °C)  85  --  115/76  89  100 %   05/05/21 15:35:51  98 9 °F (37 2 °C)  81  16  120/80  93  100 %   05/05/21 11:20:48  98 6 °F (37 °C)  92  18  111/71  84  99 %   05/05/21 08:21:05  98 1 °F (36 7 °C)  87  16  108/66  80  100 %   05/05/21 02:43:15  98 2 °F (36 8 °C)  89  18  123/70  88  100 %     Pertinent Labs/Diagnostic Results:       Results from last 7 days   Lab Units 05/07/21  0612 05/06/21  0502 05/05/21  0557 05/04/21  0627 05/03/21  0601   WBC Thousand/uL 8 99 10 88* 10 98* 9 55 13 53*   HEMOGLOBIN g/dL 8 4* 8 3* 8 1* 9 2* 11 0*   HEMATOCRIT % 24 4* 24 3* 23 9* 27 2* 31 9*   PLATELETS Thousands/uL 127* 132* 124* 139* 224         Results from last 7 days   Lab Units 05/05/21  0557 05/04/21  0627 05/03/21  0601 05/02/21  0451 05/01/21  0554   SODIUM mmol/L 133* 133* 130* 133* 133*   POTASSIUM mmol/L 4 0 4 2 4 2 3 8 3 7   CHLORIDE mmol/L 105 103 98* 97* 98*   CO2 mmol/L 21 24 22 28 29   ANION GAP mmol/L 7 6 10 8 6   BUN mg/dL 22 24 30* 23 17   CREATININE mg/dL 0 38* 0 46* 0 58* 0 47* 0 51*   EGFR ml/min/1 73sq m 120 113 105 112 109   CALCIUM mg/dL 7 9* 8 1* 8 7 9 5 8 9   PHOSPHORUS mg/dL  --   --   --  2 7 1 4*     Results from last 7 days   Lab Units 05/05/21  0557 05/02/21  0451   AST U/L 14  --    ALT U/L 56  --    ALK PHOS U/L 55  --    TOTAL PROTEIN g/dL 4 6*  --    ALBUMIN g/dL 2 4* 3 2*   TOTAL BILIRUBIN mg/dL 0 28  --      Results from last 7 days   Lab Units 05/07/21  1132 05/07/21  0732 05/07/21  7081 05/07/21  0037 05/06/21  1645 05/06/21  1130 05/06/21  0504 05/06/21  0011 05/05/21  1740 05/05/21  1209 05/05/21  0539 05/04/21  2358   POC GLUCOSE mg/dl 239* 197* 203* 226* 135 181* 192* 261* 159* 199* 222* 309*     Results from last 7 days   Lab Units 05/05/21  0557 05/04/21  0627 05/03/21  0601 05/02/21  0451 05/01/21  0554   GLUCOSE RANDOM mg/dL 225* 283* 211* 151* 239*     Results from last 7 days   Lab Units 05/04/21  0627 05/03/21  1112   PROCALCITONIN ng/ml <0 05 <0 05     Results from last 7 days   Lab Units 05/04/21  2243 05/04/21  1832 05/04/21  1026 05/04/21  0347 05/04/21  0025   LACTIC ACID mmol/L 3 1* 4 2* 3 8* 3 0* 2 9*     Medications:   Scheduled Medications:  amLODIPine, 5 mg, Oral, Daily  dexamethasone, 4 mg, Intravenous, Q8H    Followed by  [START ON 5/9/2021] dexamethasone, 4 mg, Intravenous, Q12H    Followed by  Jeannie Graves ON 5/12/2021] dexamethasone, 4 mg, Intravenous, Daily  famotidine, 20 mg, Oral, BID  heparin (porcine), 5,000 Units, Subcutaneous, Q8H MEÑO  insulin glargine, 11 Units, Subcutaneous, HS  insulin lispro, 1-5 Units, Subcutaneous, Q6H MEÑO  lidocaine, 1 patch, Topical, Daily  methotrexate (PF) IVPB, 5,000 mg, Intravenous, Once  ondansetron with dexamethasone IVPB, , Intravenous, Once  polyethylene glycol, 17 g, Oral, Daily  senna-docusate sodium, 1 tablet, Oral, BID  tamsulosin, 0 4 mg, Oral, Daily With Dinner      Continuous IV Infusions:  sodium chloride, 50 mL/hr, Intravenous, Continuous      PRN Meds:  albuterol, 2 puff, Inhalation, Q6H PRN  bisacodyl, 10 mg, Rectal, Daily PRN  lactulose, 10 g, Oral, BID PRN  ondansetron, 4 mg, Intravenous, Q6H PRN  sodium chloride, 20 mL/hr, Intravenous, Once PRN  sodium chloride, 20 mL/hr, Intravenous, Once PRN    Discharge Plan:     Network Utilization Review Department  ATTENTION: Please call with any questions or concerns to [phone] and carefully listen to the prompts so that you are directed to the right person  All voicemails are confidential   Casi Ramos all requests for admission clinical reviews, approved or denied determinations and any other requests to dedicated fax number below belonging to the campus where the patient is receiving treatment   List of dedicated fax numbers for the Facilities:  [address] DENIALS (Administrative/Medical Necessity) [phone]   [address] (Maternity/NICU/Pediatrics) [phone]   [address] [place] [address] Dr Aleisha Oh [phone] [place] [address] [address] [address] [facility] [phone] 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 Had subsequent follow up with Dr. Daily during which medications were adjusted.   - continue metoprolol/ mexilitine, D/W Dr. Daily over the phone will restart amiodarone bolus and drip  - will discvuss with Dr. Quarles regarding VT ablation. 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 Had subsequent follow up with Dr. Daily during which medications were adjusted.   - continue metoprolol/ mexilitine, D/W Dr. Daily over the phone will restart amiodarone bolus and drip  - will discvuss with Dr. Cloud regarding VT ablation.

## 2021-11-17 NOTE — PATIENT PROFILE ADULT - LIVES WITH
Discharge phone call completed with patient on 11/16/21.  Patient reports questions or concerns for herself or the baby.     The following questions or concerns were verbalized:  · Patient is formula feeding; breast care for non-breastfeeding mothers reviewed with patient verbalizing understanding.    Patient is aware of signs and symptoms necessitating a call to her [place] CNM or baby's doctor. Patient states she did not get her prescriptions filled and picked up Tylenol over the comfortable for discomfort. Patient states she is having BM\"s without difficulty.    An Epic message was sent to CNM RN coordinator to call patient and schedule her postpartum appointments. Patient states baby has seen the doctor since being home from the hospital.    Gardenia Barrios RN  
sibling(s)/spouse

## 2022-03-11 NOTE — ED ADULT NURSE NOTE - BMI (KG/M2)
Rozina Ivey is a 49 year old female presenting to the walk-in clinic today for L ear pain past week, off balance at times. Also reports rash is ongoing: seen in [facility] 2/27 and  ER 3/4/22 with concerns for bed bugs.         Patient would like communication of their results via:      Home Phone: [phone] (home)  Okay to leave a message containing results? Yes    Patient was wearing a mask during rooming process.  Writer was wearing a mask, face shield, gown and gloves during rooming process.     25.3

## 2022-05-23 NOTE — H&P ADULT - NSCORESITESY/N_GEN_A_CORE_RD
1) For fibromyalgia, we usually recommend treating with a combination of medications and lifestyle/activity modifications. Pain-directed medications such as gabapentin or Lyrica, antidepressant-class medications more effective for pain such as SNRI's (duloxetine or milnacripran),or TCA-type medications like Elavil (amitriptyline) which can help with headaches and sleep as well, are all first-line alternatives, and these can be sometimes used in careful combination. For activities, search for \"bryce chi for arthritis\" for free 1-hour videos from [employer] on [place], which has been shown to be as helpful as any medications we use when done for 30 minutes per day, and can be done in conjunction with medication management. Also, if you can find a compounding pharmacy that can make you naltrexone 4.5mg daily, this has been shown to be helpful for pain in fibromyalgia as well (Arthritis Rheum. 2013 Feb;65(2):529-38). Opiate-type medications have actually been shown to associate with worse pain in the long-term, so we recommend avoiding these for fibromyalgia-related pain. As we exclusively treat inflammatory autoimmune disease from this clinic, we rely on your primary care physician (sometimes in conjunction with a Pain Management physician) to manage fibromyalgia. 2) You can try Bryce Chi to try and help with your pain. 3) Make sure to continue taking your vitamin D supplements. A vitamin D deficiency can mimic the symptoms of fibromyalgia. 4) You can take 1,000 micrograms of B12. This can help if you are having numbness and tingling in your feet. 5) Get your labs done at your earliest convenience. 6) I am going to refer you to physical therapy. You can take this referral to wherever is the most convenient for you. 7) Follow up as needed if you experience new rashes or if your other doctors are concerned you may have an autoimmune disease. No

## 2023-02-16 NOTE — PATIENT PROFILE ADULT - HAS THE PATIENT RECEIVED THE INFLUENZA VACCINE THIS SEASON?
no... Rinvoq Pregnancy And Lactation Text: Based on animal studies, Rinvoq may cause embryo-fetal harm when administered to pregnant women.  The medication should not be used in pregnancy.  Breastfeeding is not recommended during treatment and for 6 days after the last dose.

## 2023-05-30 NOTE — ED PROVIDER NOTE - NSTIMEPROVIDERCAREINITIATE_GEN_ER
30-Mar-2019 17:37
Patient updated and verbalized understanding and will bring readings to appointment next week.  
stated

## 2023-09-24 NOTE — PATIENT PROFILE ADULT - NSASFALLNEEDSASSIST_GEN_A_NUR
Uli Geena  Neurosurgery  [address] [place] [address]  Phone: [phone]  Fax: [phone]  Follow Up Time:    no

## 2023-09-24 NOTE — PROGRESS NOTE ADULT - PROBLEM SELECTOR PLAN 1
patient wasnts to f/u with neurology as outpatient. will DC against medical advise 53 yo M with PMHx of GERD, presenting c/o abdominal pain with N/V/C x 1d. Pt reports feeling abdominal bloating, generalized lower abdominal pain with nausea and constipation since 10pm last night. Took some gas x and pepcid with no improvement. Went to [facility], prescribed antiemetics and colace with minimal relief. Noted worsening pain, 4 episodes of NBNB emesis today and fever of 100.9 at home. Denies melena, hematochezia, hematuria, change in urinary function, dysuria, d/c, flank pain, HA, dizziness, focal weakness, CP, SOB, palpitations, cough, and malaise. Last BM this morning with firm stool. +flatus

## 2023-10-12 NOTE — PROGRESS NOTE ADULT - SUBJECTIVE AND OBJECTIVE BOX
YONATHAN FAGAN 78y Male  MRN#: [id number]       SUBJECTIVE    78 year old with PMH of MI in 1995, systolic CHF s/p AICD, COPD on home oxygen, (rheumatic heart disease), bioprosthetic aortic valve, melanoma and squamous cell carcinoma s/p resections presents to the ED with SOB and cough for 3 days.  In ED, pt had CT chest with IV contrast showing ascending thoracic aortic aneurysm but no aortic dissection.  CXR negative for effusion.  Cardiac enzymes have been negative x 3.  His EKG showed wide complex v tach at HR of 113.  EP was consulted for device interrogation and pt was found to have episodes of slow Vtach.  Pt had RVP performed, negative for influenza but positive for Rhinovirus (enterovirus).  He was loaded with lidocaine and now on amiodarone and mexelitine. Currently the patient is in no acute distress. 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  Right inguinal hernia: 2006  H/O surgery to major organs, presenting hazards to health: Cholecystectomy 2017  H/O surgery to heart and great vessels, presenting hazards to health: AVR (2015 / MVR (1995)  H/O surgery to major organs, presenting hazards to health: ICD Implant 2012    ALLERGIES:  No Known Allergies    MEDICATIONS:  STANDING MEDICATIONS  ALBUTerol    0.083% 2.5 milliGRAM(s) Nebulizer every 8 hours  amiodarone    Tablet 200 milliGRAM(s) Oral daily  buDESOnide  80 MICROgram(s)/formoterol 4.5 MICROgram(s) Inhaler 2 Puff(s) Inhalation two times a day  chlorhexidine 4% Liquid 1 Application(s) Topical <User Schedule>  guaiFENesin  milliGRAM(s) Oral every 12 hours  influenza   Vaccine 0.5 milliLiter(s) IntraMuscular once  metoprolol tartrate 25 milliGRAM(s) Oral two times a day  mexiletine 200 milliGRAM(s) Oral two times a day  predniSONE   Tablet 40 milliGRAM(s) Oral daily  simvastatin 40 milliGRAM(s) Oral at bedtime  sodium chloride 0.9%. 500 milliLiter(s) IV Continuous <Continuous>  tiotropium 18 MICROgram(s) Capsule 1 Capsule(s) Inhalation daily    PRN MEDICATIONS      VITAL SIGNS: Last 24 Hours  T(C): 36.4 (27 Feb 2019 07:21), Max: 36.7 (26 Feb 2019 19:22)  T(F): 97.6 (27 Feb 2019 07:21), Max: 98 (26 Feb 2019 19:22)  HR: 78 (27 Feb 2019 07:21) (78 - 88)  BP: 128/79 (27 Feb 2019 07:21) (91/54 - 128/79)  BP(mean): 97 (27 Feb 2019 07:21) (73 - 101)  RR: 20 (27 Feb 2019 07:21) (18 - 41)  SpO2: 95% (26 Feb 2019 19:22) (95% - 96%)    LABS:                        15.8   19.36 )-----------( 247      ( 27 Feb 2019 05:10 )             48.9     02-27    142  |  100  |  36<H>  ----------------------------<  118<H>  4.9   |  27  |  1.3    Ca    10.2<H>      27 Feb 2019 05:10  Mg     2.5     02-27    TPro  7.3  /  Alb  4.0  /  TBili  0.5  /  DBili  x   /  AST  27  /  ALT  44<H>  /  AlkPhos  76  02-27    PT/INR - ( 27 Feb 2019 05:10 )   PT: >40.00 sec;   INR: 4.93 ratio         PTT - ( 27 Feb 2019 05:10 )  PTT:48.5 sec          Culture - Blood (collected 25 Feb 2019 11:47)  Source: .Blood None  Preliminary Report (26 Feb 2019 18:01):    No growth to date.      CARDIAC MARKERS ( 26 Feb 2019 06:01 )  x     / <0.01 ng/mL / 61 U/L / x     / 2.4 ng/mL  CARDIAC MARKERS ( 25 Feb 2019 11:47 )  x     / <0.01 ng/mL / 99 U/L / x     / 2.9 ng/mL      RADIOLOGY:      PHYSICAL EXAM:  >>> <<<      ADMISSION SUMMARY  Patient is a 78y old Male who presents with a chief complaint of sob and cough for 3 days (27 Feb 2019 08:46)  Currently admitted to medicine with the primary diagnosis of CHF exacerbation  Hospital course has been complicated by _______.       ASSESSMENT & PLAN    1. CHF EXACERBATION      2.    3. CAD (coronary artery disease)  Hyperlipidemia, unspecified hyperlipidemia type  Other irritable bowel syndrome  Former smoker, stopped smoking many years ago  Cancer: Basal Cell / Squamous Cell  Osteoarthritis  ICD (implantable cardioverter-defibrillator) in place  Chronic systolic congestive heart failure  MI, old: MI / Cardiac Arrest 1995  ROMERO (dyspnea on exertion)  Chronic obstructive pulmonary disease, unspecified COPD type YONATHAN FAGAN 78y Male  MRN#: [id number]       SUBJECTIVE    78 year old with PMH of MI in 1995, systolic CHF s/p AICD, COPD on home oxygen, (rheumatic heart disease), bioprosthetic aortic valve, melanoma and squamous cell carcinoma s/p resections presents to the ED with SOB and cough for 3 days.  In ED, pt had CT chest with IV contrast showing ascending thoracic aortic aneurysm but no aortic dissection.  CXR negative for effusion.  Cardiac enzymes have been negative x 3.  His EKG showed wide complex v tach at HR of 113.  EP was consulted for device interrogation and pt was found to have episodes of slow Vtach.  Pt had RVP performed, negative for influenza but positive for Rhinovirus (enterovirus).  He was loaded with lidocaine and now on amiodarone and mexelitine. Currently the patient is in no acute distress, heart rate better controlled. No over night events noted.     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  Right inguinal hernia: 2006  H/O surgery to major organs, presenting hazards to health: Cholecystectomy 2017  H/O surgery to heart and great vessels, presenting hazards to health: AVR (2015 / MVR (1995)  H/O surgery to major organs, presenting hazards to health: ICD Implant 2012    ALLERGIES:  No Known Allergies    MEDICATIONS:  STANDING MEDICATIONS  ALBUTerol    0.083% 2.5 milliGRAM(s) Nebulizer every 8 hours  amiodarone    Tablet 200 milliGRAM(s) Oral daily  buDESOnide  80 MICROgram(s)/formoterol 4.5 MICROgram(s) Inhaler 2 Puff(s) Inhalation two times a day  chlorhexidine 4% Liquid 1 Application(s) Topical <User Schedule>  guaiFENesin  milliGRAM(s) Oral every 12 hours  influenza   Vaccine 0.5 milliLiter(s) IntraMuscular once  metoprolol tartrate 25 milliGRAM(s) Oral two times a day  mexiletine 200 milliGRAM(s) Oral two times a day  predniSONE   Tablet 40 milliGRAM(s) Oral daily  simvastatin 40 milliGRAM(s) Oral at bedtime  sodium chloride 0.9%. 500 milliLiter(s) IV Continuous <Continuous>  tiotropium 18 MICROgram(s) Capsule 1 Capsule(s) Inhalation daily    PRN MEDICATIONS      VITAL SIGNS: Last 24 Hours  T(C): 36.4 (27 Feb 2019 07:21), Max: 36.7 (26 Feb 2019 19:22)  T(F): 97.6 (27 Feb 2019 07:21), Max: 98 (26 Feb 2019 19:22)  HR: 78 (27 Feb 2019 07:21) (78 - 88)  BP: 128/79 (27 Feb 2019 07:21) (91/54 - 128/79)  BP(mean): 97 (27 Feb 2019 07:21) (73 - 101)  RR: 20 (27 Feb 2019 07:21) (18 - 41)  SpO2: 95% (26 Feb 2019 19:22) (95% - 96%)    LABS:                        15.8   19.36 )-----------( 247      ( 27 Feb 2019 05:10 )             48.9     02-27    142  |  100  |  36<H>  ----------------------------<  118<H>  4.9   |  27  |  1.3    Ca    10.2<H>      27 Feb 2019 05:10  Mg     2.5     02-27    TPro  7.3  /  Alb  4.0  /  TBili  0.5  /  DBili  x   /  AST  27  /  ALT  44<H>  /  AlkPhos  76  02-27    PT/INR - ( 27 Feb 2019 05:10 )   PT: >40.00 sec;   INR: 4.93 ratio         PTT - ( 27 Feb 2019 05:10 )  PTT:48.5 sec          Culture - Blood (collected 25 Feb 2019 11:47)  Source: .Blood None  Preliminary Report (26 Feb 2019 18:01):    No growth to date.      CARDIAC MARKERS ( 26 Feb 2019 06:01 )  x     / <0.01 ng/mL / 61 U/L / x     / 2.4 ng/mL  CARDIAC MARKERS ( 25 Feb 2019 11:47 )  x     / <0.01 ng/mL / 99 U/L / x     / 2.9 ng/mL      RADIOLOGY:  < from: Transthoracic Echocardiogram (02.26.19 @ 08:42) >  Summary:   1. Left ventricular ejection fraction, by visual estimation, is 25 to   30%.   2. Severely decreased global left ventricular systolic function.   3. Mean gradient across MV is 10.   4. PSAP at least 35.   5. Bioprosthesis in the aortic position.   6. Mild aortic regurgitation.   7. Peak AV gradient is 25 with a mean of 12 KARSTEN 1.3.   8. Trace pulmonic valve regurgitation.   9. Dilatation of the ascending aorta.    < end of copied text >    < from: Xray Chest 1 View-PORTABLE IMMEDIATE (02.25.19 @ 11:03) >    Impression:      No consolidation, effusion or pneumothorax    < end of copied text >      PHYSICAL EXAM:      # Ventricular Tachycardia  - pt has AICD device interrogation showed V tach, pt seen by cardio and EP; loaded with amio and lidocaine.  -Now switched to Mexilitine and Amiodarone,   - cardiac enzymes negative X3  -Echo shows EF 25 to 30 %  -Clear from EP to be discharged home and follow up outpatient.    # COPD exacerbation likely due to entero/rhinovirus  - RVP positive for entero/hinovirus. Blood cultures negative,  CXR clear.   - Prednisone 40 mg PO x 5 days than 20 mg for 5 days  - xopenex as needed  -doxy 100 mg twice daily for 7 days  - Tiotropium once a day  -Follow up with Pulmnology outpatient   -Elevated WBC. Possibly secondary to steroids. Patient afebrile and no acute distress.     # History of rheumatic heart disease s/p mechanical Mitral valve and bioprosthetic aortic valve.  - On coumadin to target INR of 2.5-3.5, currently coumadin on hold as INR is supratherapeutic   -Follow Up with PCP. and resume Coumadin once INR is between 2-3.      # Thoracic aortic aneurism  - CT angio showed Ascending thoracic aortic aneurysm measuring up to 4.8 cm and descending thoracic aortic aneurysm measuring 3.7 cm, stable.  - monitor BP consider labetalol if BP is high.    # Incidental right renal lesion found on CT scan; Indeterminate right interpolar 1.1 cm renal lesion.   Out patient follow-up renal mass protocol CT or MRI is recommended in 3-6 months.    # Dyslipidemia  -Continue with Statins    # GI prophylaxis  -On protonix     # DVT prophylaxis  -On coumadin. Currently supra-therapeutic. YONATHAN FAGAN 78y Male  MRN#: [id number]       SUBJECTIVE    78 year old with PMH of MI in 1995, systolic CHF s/p AICD, COPD on home oxygen, (rheumatic heart disease), bioprosthetic aortic valve, melanoma and squamous cell carcinoma s/p resections presents to the ED with SOB and cough for 3 days.  In ED, pt had CT chest with IV contrast showing ascending thoracic aortic aneurysm but no aortic dissection.  CXR negative for effusion.  Cardiac enzymes have been negative x 3.  His EKG showed wide complex v tach at HR of 113.  EP was consulted for device interrogation and pt was found to have episodes of slow Vtach.  Pt had RVP performed, negative for influenza but positive for Rhinovirus (enterovirus).  He was loaded with lidocaine and now on amiodarone and mexelitine. Currently the patient is in no acute distress, heart rate better controlled. No over night events noted.     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  Right inguinal hernia: 2006  H/O surgery to major organs, presenting hazards to health: Cholecystectomy 2017  H/O surgery to heart and great vessels, presenting hazards to health: AVR (2015 / MVR (1995)  H/O surgery to major organs, presenting hazards to health: ICD Implant 2012    ALLERGIES:  No Known Allergies    MEDICATIONS:  STANDING MEDICATIONS  ALBUTerol    0.083% 2.5 milliGRAM(s) Nebulizer every 8 hours  amiodarone    Tablet 200 milliGRAM(s) Oral daily  buDESOnide  80 MICROgram(s)/formoterol 4.5 MICROgram(s) Inhaler 2 Puff(s) Inhalation two times a day  chlorhexidine 4% Liquid 1 Application(s) Topical <User Schedule>  guaiFENesin  milliGRAM(s) Oral every 12 hours  influenza   Vaccine 0.5 milliLiter(s) IntraMuscular once  metoprolol tartrate 25 milliGRAM(s) Oral two times a day  mexiletine 200 milliGRAM(s) Oral two times a day  predniSONE   Tablet 40 milliGRAM(s) Oral daily  simvastatin 40 milliGRAM(s) Oral at bedtime  sodium chloride 0.9%. 500 milliLiter(s) IV Continuous <Continuous>  tiotropium 18 MICROgram(s) Capsule 1 Capsule(s) Inhalation daily    PRN MEDICATIONS      VITAL SIGNS: Last 24 Hours  T(C): 36.4 (27 Feb 2019 07:21), Max: 36.7 (26 Feb 2019 19:22)  T(F): 97.6 (27 Feb 2019 07:21), Max: 98 (26 Feb 2019 19:22)  HR: 78 (27 Feb 2019 07:21) (78 - 88)  BP: 128/79 (27 Feb 2019 07:21) (91/54 - 128/79)  BP(mean): 97 (27 Feb 2019 07:21) (73 - 101)  RR: 20 (27 Feb 2019 07:21) (18 - 41)  SpO2: 95% (26 Feb 2019 19:22) (95% - 96%)    LABS:                        15.8   19.36 )-----------( 247      ( 27 Feb 2019 05:10 )             48.9     02-27    142  |  100  |  36<H>  ----------------------------<  118<H>  4.9   |  27  |  1.3    Ca    10.2<H>      27 Feb 2019 05:10  Mg     2.5     02-27    TPro  7.3  /  Alb  4.0  /  TBili  0.5  /  DBili  x   /  AST  27  /  ALT  44<H>  /  AlkPhos  76  02-27    PT/INR - ( 27 Feb 2019 05:10 )   PT: >40.00 sec;   INR: 4.93 ratio         PTT - ( 27 Feb 2019 05:10 )  PTT:48.5 sec          Culture - Blood (collected 25 Feb 2019 11:47)  Source: .Blood None  Preliminary Report (26 Feb 2019 18:01):    No growth to date.      CARDIAC MARKERS ( 26 Feb 2019 06:01 )  x     / <0.01 ng/mL / 61 U/L / x     / 2.4 ng/mL  CARDIAC MARKERS ( 25 Feb 2019 11:47 )  x     / <0.01 ng/mL / 99 U/L / x     / 2.9 ng/mL      RADIOLOGY:  < from: Transthoracic Echocardiogram (02.26.19 @ 08:42) >  Summary:   1. Left ventricular ejection fraction, by visual estimation, is 25 to   30%.   2. Severely decreased global left ventricular systolic function.   3. Mean gradient across MV is 10.   4. PSAP at least 35.   5. Bioprosthesis in the aortic position.   6. Mild aortic regurgitation.   7. Peak AV gradient is 25 with a mean of 12 KARSTEN 1.3.   8. Trace pulmonic valve regurgitation.   9. Dilatation of the ascending aorta.    < end of copied text >    < from: Xray Chest 1 View-PORTABLE IMMEDIATE (02.25.19 @ 11:03) >    Impression:      No consolidation, effusion or pneumothorax    < end of copied text >      PHYSICAL EXAM:  GENERAL : NAD, Alert and awake x3  CHEST: Shallow breathing bilaterally, mild crackles.  HEART: No murmurs rubs.  ABDOMEN: Soft non tender.  EXTREMITIES: Pulses +2, No edema noted.    ASSESSMENT AND PLAN    # Ventricular Tachycardia  - pt has AICD device interrogation showed V tach, pt seen by cardio and EP; loaded with amio and lidocaine.  -Now switched to Mexilitine and Amiodarone,   - cardiac enzymes negative X3  -Echo shows EF 25 to 30 %  -Clear from EP to be discharged home and follow up outpatient.    # COPD exacerbation likely due to entero/rhinovirus  - RVP positive for entero/hinovirus. Blood cultures negative,  CXR clear.   - Prednisone 40 mg PO x 5 days than 20 mg for 5 days  - xopenex as needed  -doxy 100 mg twice daily for 7 days  - Tiotropium once a day  -Follow up with Pulmnology outpatient   -Elevated WBC. Possibly secondary to steroids. Patient afebrile and no acute distress.     # History of rheumatic heart disease s/p mechanical Mitral valve and bioprosthetic aortic valve.  - On coumadin to target INR of 2.5-3.5, currently coumadin on hold as INR is supratherapeutic   -Follow Up with PCP. and resume Coumadin once INR is between 2-3.      # Thoracic aortic aneurism  - CT angio showed Ascending thoracic aortic aneurysm measuring up to 4.8 cm and descending thoracic aortic aneurysm measuring 3.7 cm, stable.  - monitor BP consider labetalol if BP is high.    # Incidental right renal lesion found on CT scan; Indeterminate right interpolar 1.1 cm renal lesion.   Out patient follow-up renal mass protocol CT or MRI is recommended in 3-6 months.    # Dyslipidemia  -Continue with Statins    # GI prophylaxis  -On protonix     # DVT prophylaxis  -On coumadin. Currently supra-therapeutic. YONATHAN FAGAN 78y Male  MRN#: [id number]       SUBJECTIVE    78 year old with PMH of MI in 1995, systolic CHF s/p AICD, COPD on home oxygen, (rheumatic heart disease), bioprosthetic aortic valve, melanoma and squamous cell carcinoma s/p resections presents to the ED with SOB and cough for 3 days.  In ED, pt had CT chest with IV contrast showing ascending thoracic aortic aneurysm but no aortic dissection.  CXR negative for effusion.  Cardiac enzymes have been negative x 3.  His EKG showed wide complex v tach at HR of 113.  EP was consulted for device interrogation and pt was found to have episodes of slow Vtach.  Pt had RVP performed, negative for influenza but positive for Rhinovirus (enterovirus).  He was loaded with lidocaine and now on amiodarone and mexelitine. Currently the patient is in no acute distress, heart rate better controlled. No over night events noted.     ALLERGIES:  No Known Allergies    MEDICATIONS:  STANDING MEDICATIONS  ALBUTerol    0.083% 2.5 milliGRAM(s) Nebulizer every 8 hours  amiodarone    Tablet 200 milliGRAM(s) Oral daily  buDESOnide  80 MICROgram(s)/formoterol 4.5 MICROgram(s) Inhaler 2 Puff(s) Inhalation two times a day  chlorhexidine 4% Liquid 1 Application(s) Topical <User Schedule>  guaiFENesin  milliGRAM(s) Oral every 12 hours  influenza   Vaccine 0.5 milliLiter(s) IntraMuscular once  metoprolol tartrate 25 milliGRAM(s) Oral two times a day  mexiletine 200 milliGRAM(s) Oral two times a day  predniSONE   Tablet 40 milliGRAM(s) Oral daily  simvastatin 40 milliGRAM(s) Oral at bedtime  sodium chloride 0.9%. 500 milliLiter(s) IV Continuous <Continuous>  tiotropium 18 MICROgram(s) Capsule 1 Capsule(s) Inhalation daily      VITAL SIGNS: Last 24 Hours  T(C): 36.4 (27 Feb 2019 07:21), Max: 36.7 (26 Feb 2019 19:22)  T(F): 97.6 (27 Feb 2019 07:21), Max: 98 (26 Feb 2019 19:22)  HR: 78 (27 Feb 2019 07:21) (78 - 88)  BP: 128/79 (27 Feb 2019 07:21) (91/54 - 128/79)  BP(mean): 97 (27 Feb 2019 07:21) (73 - 101)  RR: 20 (27 Feb 2019 07:21) (18 - 41)  SpO2: 95% (26 Feb 2019 19:22) (95% - 96%)    LABS:                        15.8   19.36 )-----------( 247      ( 27 Feb 2019 05:10 )             48.9     02-27    142  |  100  |  36<H>  ----------------------------<  118<H>  4.9   |  27  |  1.3    Ca    10.2<H>      27 Feb 2019 05:10  Mg     2.5     02-27    TPro  7.3  /  Alb  4.0  /  TBili  0.5  /  DBili  x   /  AST  27  /  ALT  44<H>  /  AlkPhos  76  02-27    PT/INR - ( 27 Feb 2019 05:10 )   PT: >40.00 sec;   INR: 4.93 ratio         PTT - ( 27 Feb 2019 05:10 )  PTT:48.5 sec          Culture - Blood (collected 25 Feb 2019 11:47)  Source: .Blood None  Preliminary Report (26 Feb 2019 18:01):    No growth to date.      CARDIAC MARKERS ( 26 Feb 2019 06:01 )  x     / <0.01 ng/mL / 61 U/L / x     / 2.4 ng/mL  CARDIAC MARKERS ( 25 Feb 2019 11:47 )  x     / <0.01 ng/mL / 99 U/L / x     / 2.9 ng/mL      RADIOLOGY:  < from: Transthoracic Echocardiogram (02.26.19 @ 08:42) >  Summary:   1. Left ventricular ejection fraction, by visual estimation, is 25 to   30%.   2. Severely decreased global left ventricular systolic function.   3. Mean gradient across MV is 10.   4. PSAP at least 35.   5. Bioprosthesis in the aortic position.   6. Mild aortic regurgitation.   7. Peak AV gradient is 25 with a mean of 12 KARSTEN 1.3.   8. Trace pulmonic valve regurgitation.   9. Dilatation of the ascending aorta.    < end of copied text >    < from: Xray Chest 1 View-PORTABLE IMMEDIATE (02.25.19 @ 11:03) >    Impression:      No consolidation, effusion or pneumothorax    < end of copied text >      PHYSICAL EXAM:  GENERAL : NAD, Alert and awake x3  CHEST: Shallow breathing bilaterally, mild crackles.  HEART: No murmurs rubs.  ABDOMEN: Soft non tender.  EXTREMITIES: Pulses +2, No edema noted.    ASSESSMENT AND PLAN    # Ventricular Tachycardia  - pt has AICD device interrogation showed V tach, pt seen by cardio and EP; loaded with amio and lidocaine.  -Now switched to Mexilitine and Amiodarone,   - cardiac enzymes negative X3  -Echo shows EF 25 to 30 %  -Clear from EP to be discharged home and follow up outpatient after EP team already readjusted AICD settings at bedside.    # COPD exacerbation likely due to entero/rhinovirus  - RVP positive for entero/hinovirus. Blood cultures negative,  CXR clear.   - Prednisone 40 mg PO x 5 days than 20 mg for 5 days  - xopenex as needed  -doxy 100 mg twice daily for 7 days  - Tiotropium once a day  -Follow up with Pulmnology outpatient   -Elevated WBC. Possibly secondary to steroids. Patient afebrile and no acute distress.     # History of rheumatic heart disease s/p mechanical Mitral valve and bioprosthetic aortic valve.  - On coumadin to target INR of 2.5-3.5, currently coumadin on hold as INR is supratherapeutic   -Follow Up with PCP. and resume Coumadin once INR is between 2-3.      # Thoracic aortic aneurism  - CT angio showed Ascending thoracic aortic aneurysm measuring up to 4.8 cm and descending thoracic aortic aneurysm measuring 3.7 cm, stable.  - monitor BP consider labetalol if BP is high.    # Incidental right renal lesion found on CT scan; Indeterminate right interpolar 1.1 cm renal lesion.   Out patient follow-up renal mass protocol CT or MRI is recommended in 3-6 months.    # Dyslipidemia  -Continue with Statins    # GI prophylaxis  -On protonix     # DVT prophylaxis  -On coumadin. Currently supra-therapeutic. Hemigard Postcare Instructions: The HEMIGARD strips are to remain completely dry for at least 5-7 days.

## 2024-01-04 NOTE — ED PROVIDER NOTE - INPATIENT RESIDENT/ACP NOTIFIED DATE
junction.  There are areas of low attenuation within this focal wall thickening/mass.  This measures approximately 5.5 x 3.2 x 5.5 cm. Prostate calcifications are noted.  A left external iliac lymph node is enlarged measuring 1.3 cm.  Right iliac lymphadenopathy measures up to 1.4 cm. Peritoneum/Retroperitoneum: Extensive retroperitoneal lymphadenopathy is present.  This measures up to 1.4 cm in short axis.  A retroaortic left renal vein is incidentally noted. There is no evidence of free intraperitoneal air. Bones/Soft Tissues: No acute abnormality.     The known bladder mass is causing obstruction of the kidneys, more severe on the left with moderate hydroureteronephrosis and delayed contrast opacification. Areas of hypoattenuation within the right renal cortex, consider pyelonephritis. Pelvic and retroperitoneal lymphadenopathy, most consistent with metastatic disease. Cholelithiasis.     XR CHEST PORTABLE    Result Date: 12/31/2023  EXAMINATION: ONE XRAY VIEW OF THE CHEST 12/31/2023 9:24 am COMPARISON: None. HISTORY: ORDERING SYSTEM PROVIDED HISTORY: chest pain TECHNOLOGIST PROVIDED HISTORY: Reason for exam:->chest pain FINDINGS: Cardiac silhouette is within normal limits. Pulmonary vasculature is within normal limits. The lungs are clear. No pneumothorax is identified. Bony structures are unremarkable.Calcified granuloma in the left mid lung is noted.     No acute cardiopulmonary process.       CBC:   Recent Labs     01/02/24  0049 01/03/24  0455   WBC 14.2* 14.1*   HGB 12.1* 12.8*   * 554*     BMP:    Recent Labs     01/02/24  0049 01/03/24  0455    136   K 3.3* 3.5   CL 98* 97*   CO2 22 24   BUN 13 13   CREATININE 1.2 1.2   GLUCOSE 97 109*     Hepatic:   Recent Labs     01/01/24  1237 01/02/24  0049 01/03/24  0455   AST  --  9* 11*   ALT  --  13 13   BILITOT  --  0.3 0.3   ALKPHOS 327* 296* 301*     Lipids:   Lab Results   Component Value Date/Time    HDL 33 01/06/2023 10:29 AM     Hemoglobin  25-Mar-2019 07:59

## 2024-06-19 NOTE — ED PROVIDER NOTE - CHIEF COMPLAINT
The patient is a 78y Male complaining of hypotension.
The patient has been re-examined and I agree with the above assessment or I updated with my findings.

## 2024-07-09 NOTE — ED ADULT TRIAGE NOTE - BP NONINVASIVE DIASTOLIC (MM HG)
85
Detail Level: Zone
Initiate Treatment: Fluticasone cream twice daily to affected areas\\n\\nOtezla (patient given starter pack in office today)

## 2024-07-22 NOTE — CHART NOTE - NSCHARTNOTESELECT_GEN_ALL_CORE
Patient Education     Sertraline (SER matty maryjane)   Brand Names: US Zoloft   Brand Names: Ric AG-Sertraline; APO-Sertraline; Auro-Sertraline; BIO-Sertraline; DOM-Sertraline [DSC]; JAMP-Sertraline [DSC]; M-Sertraline; Mar-Sertraline; MINT-Sertraline; NRA-Sertraline; PMS-Sertraline; Priva-Sertraline [DSC]; RAN-Sertraline [DSC]; EVELIN-Sertraline; SANDOZ Sertraline [DSC]; TEVA-Sertraline; Zoloft   Warning   Drugs like this one have raised the chance of suicidal thoughts or actions in children and young adults. The risk may be greater in people who have had these thoughts or actions in the past. All people who take this drug need to be watched closely. Call the doctor right away if signs like depression, nervousness, restlessness, grouchiness, panic attacks, or changes in mood or actions are new or worse. Call the doctor right away if any thoughts or actions of suicide occur.  What is this drug used for?   It is used to treat depression.  It is used to treat obsessive-compulsive problems.  It is used to treat panic attacks.  It is used to treat post-traumatic stress.  It is used to treat mood problems caused by monthly periods.  It is used to treat social anxiety problems.  It may be given to you for other reasons. Talk with the doctor.  What do I need to tell my doctor BEFORE I take this drug?   All products:   If you are allergic to this drug; any part of this drug; or any other drugs, foods, or substances. Tell your doctor about the allergy and what signs you had.  If you have liver disease.  If you are taking any of these drugs: Linezolid or methylene blue.  If you are taking pimozide.  If you have taken certain drugs for depression or Parkinson's disease in the last 14 days. This includes isocarboxazid, phenelzine, tranylcypromine, selegiline, or rasagiline. Very high blood pressure may happen.  If you are taking any drugs that can cause a certain type of heartbeat that is not normal (prolonged QT interval). There  Event Note are many drugs that can do this. Ask your doctor or pharmacist if you are not sure.  Oral solution:   If you have a latex allergy. The dropper has rubber.  If you are taking disulfiram.  If you are pregnant or may be pregnant. This form of this drug has alcohol in it. Do not take this form of this drug if you are pregnant.  This is not a list of all drugs or health problems that interact with this drug.  Tell your doctor and pharmacist about all of your drugs (prescription or OTC, natural products, vitamins) and health problems. You must check to make sure that it is safe for you to take this drug with all of your drugs and health problems. Do not start, stop, or change the dose of any drug without checking with your doctor.  What are some things I need to know or do while I take this drug?   Tell all of your health care providers that you take this drug. This includes your doctors, nurses, pharmacists, and dentists.  Avoid driving and doing other tasks or actions that call for you to be alert until you see how this drug affects you.  Do not stop taking this drug all of a sudden without calling your doctor. You may have a greater risk of side effects. If you need to stop this drug, you will want to slowly stop it as ordered by your doctor.  Avoid drinking alcohol while taking this drug.  Talk with your doctor before you use marijuana, other forms of cannabis, or prescription or OTC drugs that may slow your actions.  In depression, sleep and appetite may get better soon after starting this drug. Other depression signs may take up to 4 weeks to get better.  If you are allergic to tartrazine (FD&C Yellow No. 5), talk with your doctor. Some products have tartrazine.  This drug may raise the chance of a broken bone. Talk with the doctor.  This drug may raise the chance of bleeding. Sometimes, bleeding can be life-threatening. Talk with the doctor.  This drug can cause low sodium levels. Very low sodium levels can be  life-threatening, leading to seizures, passing out, trouble breathing, or death.  Some people may have a higher chance of eye problems with this drug. Your doctor may want you to have an eye exam to see if you have a higher chance of these eye problems. Call your doctor right away if you have eye pain, change in eyesight, or swelling or redness in or around the eye.  A severe and sometimes deadly problem called serotonin syndrome may happen. The risk may be greater if you also take certain other drugs. Call your doctor right away if you have agitation; change in balance; confusion; hallucinations; fever; fast or abnormal heartbeat; flushing; muscle twitching or stiffness; seizures; shivering or shaking; sweating a lot; severe diarrhea, upset stomach, or throwing up; or very bad headache.  This drug may affect certain lab tests. Tell all of your health care providers and lab workers that you take this drug.  If you are 65 or older, use this drug with care. You could have more side effects.  This drug is not approved for use in all children. Talk with the doctor to be sure that this drug is right for your child.  If the patient is a child, use this drug with care. The risk of some side effects may be higher in children.  This drug may affect growth in children and teens in some cases. They may need regular growth checks. Talk with the doctor.  Tell your doctor if you are pregnant, may be pregnant, or plan to get pregnant while taking this drug. You will need to talk about the benefits and risks to you and the baby. Taking this drug in the third trimester of pregnancy may raise your risk of bleeding after delivery and may lead to some health problems in the .  Tell your doctor if you are breast-feeding. You will need to talk about any risks to your baby.  What are some side effects that I need to call my doctor about right away?   WARNING/CAUTION: Even though it may be rare, some people may have very bad and  sometimes deadly side effects when taking a drug. Tell your doctor or get medical help right away if you have any of the following signs or symptoms that may be related to a very bad side effect:  Signs of an allergic reaction, like rash; hives; itching; red, swollen, blistered, or peeling skin with or without fever; wheezing; tightness in the chest or throat; trouble breathing, swallowing, or talking; unusual hoarseness; or swelling of the mouth, face, lips, tongue, or throat.  Signs of low sodium levels like headache, trouble focusing, memory problems, feeling confused, weakness, seizures, or change in balance.  Signs of bleeding like throwing up or coughing up blood; vomit that looks like coffee grounds; blood in the urine; black, red, or tarry stools; bleeding from the gums; abnormal vaginal bleeding; bruises without a cause or that get bigger; or bleeding you cannot stop.  Signs of a very bad skin reaction (Leahy-Ezequiel syndrome/toxic epidermal necrolysis) like red, swollen, blistered, or peeling skin (with or without fever); red or irritated eyes; or sores in the mouth, throat, nose, or eyes.  Seizures.  Not able to control bladder.  A big weight gain or loss.  Sex problems have happened with drugs like this one. This includes lowered interest in sex, trouble having an orgasm, ejaculation problems, or trouble getting or keeping an erection. If you have any questions, talk with your doctor.  Liver problems have rarely happened with this drug. Sometimes, this has been deadly. Call your doctor right away if you have signs of liver problems like dark urine, tiredness, decreased appetite, upset stomach or stomach pain, light-colored stools, throwing up, or yellow skin or eyes.  A type of abnormal heartbeat (prolonged QT interval) has happened with this drug. Sometimes, this has led to another type of unsafe abnormal heartbeat (torsades de pointes). Call your doctor right away if you have a fast or abnormal  heartbeat, or if you pass out.  What are some other side effects of this drug?   All drugs may cause side effects. However, many people have no side effects or only have minor side effects. Call your doctor or get medical help if any of these side effects or any other side effects bother you or do not go away:  Feeling dizzy, sleepy, tired, or weak.  Constipation, diarrhea, stomach pain, upset stomach, throwing up, or decreased appetite.  Dry mouth.  Trouble sleeping.  Sweating a lot.  Shakiness.  These are not all of the side effects that may occur. If you have questions about side effects, call your doctor. Call your doctor for medical advice about side effects.  You may report side effects to your national health agency.  You may report side effects to the FDA at 1-897.621.1184. You may also report side effects at https://www.fda.gov/medwatch.  How is this drug best taken?   Use this drug as ordered by your doctor. Read all information given to you. Follow all instructions closely.  Tablets:   Take with or without food.  Keep taking this drug as you have been told by your doctor or other health care provider, even if you feel well.  Capsules:   Take with or without food as you have been told by your doctor.  Swallow whole. Do not chew, open, or crush.  Keep taking this drug as you have been told by your doctor or other health care provider, even if you feel well.  Oral solution:   Only use the measuring device that comes with this drug.  Mix liquid with 1/2 cup of water, ginger ale, lemon-lime soda, lemonade, or orange juice.  After mixing, take your dose right away. Do not store for future use.  This drug may look hazy after mixing. This is normal.  Keep taking this drug as you have been told by your doctor or other health care provider, even if you feel well.  What do I do if I miss a dose?   Take a missed dose as soon as you think about it.  If it is close to the time for your next dose, skip the missed dose  and go back to your normal time.  Do not take 2 doses at the same time or extra doses.  How do I store and/or throw out this drug?   Store at room temperature in a dry place. Do not store in a bathroom.  Keep lid tightly closed.  Keep all drugs in a safe place. Keep all drugs out of the reach of children and pets.  Throw away unused or  drugs. Do not flush down a toilet or pour down a drain unless you are told to do so. Check with your pharmacist if you have questions about the best way to throw out drugs. There may be drug take-back programs in your area.  General drug facts   If your symptoms or health problems do not get better or if they become worse, call your doctor.  Do not share your drugs with others and do not take anyone else's drugs.  Some drugs may have another patient information leaflet. If you have any questions about this drug, please talk with your doctor, nurse, pharmacist, or other health care provider.  This drug comes with an extra patient fact sheet called a Medication Guide. Read it with care. Read it again each time this drug is refilled. If you have any questions about this drug, please talk with the doctor, pharmacist, or other health care provider.  If you think there has been an overdose, call your poison control center or get medical care right away. Be ready to tell or show what was taken, how much, and when it happened.  Consumer Information Use and Disclaimer   This generalized information is a limited summary of diagnosis, treatment, and/or medication information. It is not meant to be comprehensive and should be used as a tool to help the user understand and/or assess potential diagnostic and treatment options. It does NOT include all information about conditions, treatments, medications, side effects, or risks that may apply to a specific patient. It is not intended to be medical advice or a substitute for the medical advice, diagnosis, or treatment of a health care  "provider based on the health care provider's examination and assessment of a patient's specific and unique circumstances. Patients must speak with a health care provider for complete information about their health, medical questions, and treatment options, including any risks or benefits regarding use of medications. This information does not endorse any treatments or medications as safe, effective, or approved for treating a specific patient. UpToDate, Inc. and its affiliates disclaim any warranty or liability relating to this information or the use thereof. The use of this information is governed by the Terms of Use, available at https://www.Voya[employer]ge.com/en/know/clinical-effectiveness-terms.  Last Reviewed Date   2023-09-08  Copyright   © 2024 UpToDate, Inc. and its affiliates and/or licensors. All rights reserved.  Patient Education     Routine physical for adults   The Basics   Written by the doctors and editors at [employer]   What is a physical? -- A physical is a routine visit, or \"check-up,\" with your doctor. You might also hear it called a \"wellness visit\" or \"preventive visit.\"  During each visit, the doctor will:   Ask about your physical and mental health   Ask about your habits, behaviors, and lifestyle   Do an exam   Give you vaccines if needed   Talk to you about any medicines you take   Give advice about your health   Answer your questions  Getting regular check-ups is an important part of taking care of your health. It can help your doctor find and treat any problems you have. But it's also important for preventing health problems.  A routine physical is different from a \"sick visit.\" A sick visit is when you see a doctor because of a health concern or problem. Since physicals are scheduled ahead of time, you can think about what you want to ask the doctor.  How often should I get a physical? -- It depends on your age and health. In general, for people age 21 years and older:   If you are younger " "than 50 years, you might be able to get a physical every 3 years.   If you are 50 years or older, your doctor might recommend a physical every year.  If you have an ongoing health condition, like diabetes or high blood pressure, your doctor will probably want to see you more often.  What happens during a physical? -- In general, each visit will include:   Physical exam - The doctor or nurse will check your height, weight, heart rate, and blood pressure. They will also look at your eyes and ears. They will ask about how you are feeling and whether you have any symptoms that bother you.   Medicines - It's a good idea to bring a list of all the medicines you take to each doctor visit. Your doctor will talk to you about your medicines and answer any questions. Tell them if you are having any side effects that bother you. You should also tell them if you are having trouble paying for any of your medicines.   Habits and behaviors - This includes:   Your diet   Your exercise habits   Whether you smoke, drink alcohol, or use drugs   Whether you are sexually active   Whether you feel safe at home  Your doctor will talk to you about things you can do to improve your health and lower your risk of health problems. They will also offer help and support. For example, if you want to quit smoking, they can give you advice and might prescribe medicines. If you want to improve your diet or get more physical activity, they can help you with this, too.   Lab tests, if needed - The tests you get will depend on your age and situation. For example, your doctor might want to check your:   Cholesterol   Blood sugar   Iron level   Vaccines - The recommended vaccines will depend on your age, health, and what vaccines you already had. Vaccines are very important because they can prevent certain serious or deadly infections.   Discussion of screening - \"Screening\" means checking for diseases or other health problems before they cause symptoms. " Your doctor can recommend screening based on your age, risk, and preferences. This might include tests to check for:   Cancer, such as breast, prostate, cervical, ovarian, colorectal, prostate, lung, or skin cancer   Sexually transmitted infections, such as chlamydia and gonorrhea   Mental health conditions like depression and anxiety  Your doctor will talk to you about the different types of screening tests. They can help you decide which screenings to have. They can also explain what the results might mean.   Answering questions - The physical is a good time to ask the doctor or nurse questions about your health. If needed, they can refer you to other doctors or specialists, too.  Adults older than 65 years often need other care, too. As you get older, your doctor will talk to you about:   How to prevent falling at home   Hearing or vision tests   Memory testing   How to take your medicines safely   Making sure that you have the help and support you need at home  All topics are updated as new evidence becomes available and our peer review process is complete.  This topic retrieved from [employer] on: May 02, 2024.  Topic 985801 Version 1.0  Release: 32.4.3 - C32.122  © 2024 UpToDate, Inc. and/or its affiliates. All rights reserved.  Consumer Information Use and Disclaimer   Disclaimer: This generalized information is a limited summary of diagnosis, treatment, and/or medication information. It is not meant to be comprehensive and should be used as a tool to help the user understand and/or assess potential diagnostic and treatment options. It does NOT include all information about conditions, treatments, medications, side effects, or risks that may apply to a specific patient. It is not intended to be medical advice or a substitute for the medical advice, diagnosis, or treatment of a health care provider based on the health care provider's examination and assessment of a patient's specific and unique circumstances.  Patients must speak with a health care provider for complete information about their health, medical questions, and treatment options, including any risks or benefits regarding use of medications. This information does not endorse any treatments or medications as safe, effective, or approved for treating a specific patient. UpToDate, Inc. and its affiliates disclaim any warranty or liability relating to this information or the use thereof.The use of this information is governed by the Terms of Use, available at https://www.woltersVeedMeuwer.com/en/know/clinical-effectiveness-terms. 2024© UpToDate, Inc. and its affiliates and/or licensors. All rights reserved.  Copyright   © 2024 UpToDate, Inc. and/or its affiliates. All rights reserved.

## 2025-06-03 NOTE — DISCHARGE NOTE PROVIDER - HOSPITAL COURSE
78 year old with PMH of MI in 1995, systolic CHF s/p AICD, COPD on home oxygen, (rheumatic heart disease), bioprosthetic aortic valve, melanoma and squamous cell carcinoma s/p resections presents to the ED for above CC during which the patient's ICD had fired twice. The first time was in his sleep on Friday night and he felt fine afterwards so he went back to bed. Second episode was last night and patient states that this one felt stronger than the one prior. He feels somewhat sore after the device shocked him. No exertional symptoms and no preceding symptoms to either shock. Only common thread was that he was asleep for both episodes. Patient denies any fever or chills , no SOB, no chest pain, no palpitation, or diaphoresis. He mentioned that he is complaint with his medications.        Cardiology was consulted and there is no need for a new catheterization because patient recently had a cath 1 year ago after his TAVR procedure. Cardiology recommended medical therapy for now and maintain INR at 2.5 - 3.5. EPS was consulted and he is recommended to have a BiVICD upgrade. However patient decline the offer and says no further intervention. EPS will hold off on ablation and continue medical therapy. Case discussed with Dr. Levin and patient is stable to be discharged home and have outpatient follow up with Cardiology, EPS, and PCP. Xray Chest 1 View AP/PA